# Patient Record
Sex: FEMALE | Race: WHITE | NOT HISPANIC OR LATINO | Employment: UNEMPLOYED | URBAN - METROPOLITAN AREA
[De-identification: names, ages, dates, MRNs, and addresses within clinical notes are randomized per-mention and may not be internally consistent; named-entity substitution may affect disease eponyms.]

---

## 2017-01-04 ENCOUNTER — COMMUNICATION - HEALTHEAST (OUTPATIENT)
Dept: FAMILY MEDICINE | Facility: CLINIC | Age: 47
End: 2017-01-04

## 2017-01-10 ENCOUNTER — COMMUNICATION - HEALTHEAST (OUTPATIENT)
Dept: FAMILY MEDICINE | Facility: CLINIC | Age: 47
End: 2017-01-10

## 2017-01-10 DIAGNOSIS — Z91.09 ENVIRONMENTAL ALLERGIES: ICD-10-CM

## 2017-01-11 ENCOUNTER — COMMUNICATION - HEALTHEAST (OUTPATIENT)
Dept: FAMILY MEDICINE | Facility: CLINIC | Age: 47
End: 2017-01-11

## 2017-01-13 ENCOUNTER — OFFICE VISIT - HEALTHEAST (OUTPATIENT)
Dept: FAMILY MEDICINE | Facility: CLINIC | Age: 47
End: 2017-01-13

## 2017-01-13 DIAGNOSIS — G89.29 NECK PAIN, CHRONIC: ICD-10-CM

## 2017-01-13 DIAGNOSIS — L08.9 BACTERIAL SKIN INFECTION OF UPPER EXTREMITY: ICD-10-CM

## 2017-01-13 DIAGNOSIS — M54.2 NECK PAIN, CHRONIC: ICD-10-CM

## 2017-01-19 ENCOUNTER — COMMUNICATION - HEALTHEAST (OUTPATIENT)
Dept: FAMILY MEDICINE | Facility: CLINIC | Age: 47
End: 2017-01-19

## 2017-02-09 ENCOUNTER — COMMUNICATION - HEALTHEAST (OUTPATIENT)
Dept: FAMILY MEDICINE | Facility: CLINIC | Age: 47
End: 2017-02-09

## 2017-02-09 DIAGNOSIS — J45.909 ASTHMA: ICD-10-CM

## 2017-02-19 ENCOUNTER — COMMUNICATION - HEALTHEAST (OUTPATIENT)
Dept: FAMILY MEDICINE | Facility: CLINIC | Age: 47
End: 2017-02-19

## 2017-02-21 ENCOUNTER — COMMUNICATION - HEALTHEAST (OUTPATIENT)
Dept: FAMILY MEDICINE | Facility: CLINIC | Age: 47
End: 2017-02-21

## 2017-02-21 DIAGNOSIS — G89.29 NECK PAIN, CHRONIC: ICD-10-CM

## 2017-02-21 DIAGNOSIS — M54.2 NECK PAIN, CHRONIC: ICD-10-CM

## 2017-03-03 ENCOUNTER — COMMUNICATION - HEALTHEAST (OUTPATIENT)
Dept: FAMILY MEDICINE | Facility: CLINIC | Age: 47
End: 2017-03-03

## 2017-03-03 DIAGNOSIS — J45.909 ASTHMA: ICD-10-CM

## 2017-03-20 ENCOUNTER — COMMUNICATION - HEALTHEAST (OUTPATIENT)
Dept: FAMILY MEDICINE | Facility: CLINIC | Age: 47
End: 2017-03-20

## 2017-03-20 DIAGNOSIS — J45.909 ASTHMA: ICD-10-CM

## 2017-03-23 ENCOUNTER — COMMUNICATION - HEALTHEAST (OUTPATIENT)
Dept: FAMILY MEDICINE | Facility: CLINIC | Age: 47
End: 2017-03-23

## 2017-03-23 DIAGNOSIS — K21.9 ESOPHAGEAL REFLUX: ICD-10-CM

## 2017-03-30 ENCOUNTER — COMMUNICATION - HEALTHEAST (OUTPATIENT)
Dept: FAMILY MEDICINE | Facility: CLINIC | Age: 47
End: 2017-03-30

## 2017-03-30 DIAGNOSIS — M54.2 NECK PAIN, CHRONIC: ICD-10-CM

## 2017-03-30 DIAGNOSIS — G89.29 NECK PAIN, CHRONIC: ICD-10-CM

## 2017-04-05 ENCOUNTER — COMMUNICATION - HEALTHEAST (OUTPATIENT)
Dept: PHYSICAL MEDICINE AND REHAB | Facility: CLINIC | Age: 47
End: 2017-04-05

## 2017-04-11 ENCOUNTER — COMMUNICATION - HEALTHEAST (OUTPATIENT)
Dept: PHYSICAL MEDICINE AND REHAB | Facility: CLINIC | Age: 47
End: 2017-04-11

## 2017-04-26 ENCOUNTER — COMMUNICATION - HEALTHEAST (OUTPATIENT)
Dept: FAMILY MEDICINE | Facility: CLINIC | Age: 47
End: 2017-04-26

## 2017-04-26 DIAGNOSIS — M54.2 NECK PAIN, CHRONIC: ICD-10-CM

## 2017-04-26 DIAGNOSIS — G89.29 NECK PAIN, CHRONIC: ICD-10-CM

## 2017-05-01 ENCOUNTER — HOSPITAL ENCOUNTER (OUTPATIENT)
Dept: MRI IMAGING | Facility: HOSPITAL | Age: 47
Discharge: HOME | End: 2017-05-01
Attending: ORTHOPAEDIC SURGERY

## 2017-05-01 DIAGNOSIS — S14.2XXA: ICD-10-CM

## 2017-05-01 DIAGNOSIS — M50.30 DEGENERATIVE DISC DISEASE, CERVICAL: ICD-10-CM

## 2017-05-01 DIAGNOSIS — R51.9 HEADACHE: ICD-10-CM

## 2017-05-12 ENCOUNTER — HOSPITAL ENCOUNTER (OUTPATIENT)
Dept: PHYSICAL MEDICINE AND REHAB | Facility: CLINIC | Age: 47
Discharge: HOME | End: 2017-05-12
Attending: ORTHOPAEDIC SURGERY

## 2017-05-12 DIAGNOSIS — M54.12 CERVICAL RADICULOPATHY: ICD-10-CM

## 2017-05-12 ASSESSMENT — MIFFLIN-ST. JEOR: SCORE: 1155.2

## 2017-05-15 ENCOUNTER — COMMUNICATION - HEALTHEAST (OUTPATIENT)
Dept: SCHEDULING | Facility: CLINIC | Age: 47
End: 2017-05-15

## 2017-05-15 ENCOUNTER — OFFICE VISIT - HEALTHEAST (OUTPATIENT)
Dept: FAMILY MEDICINE | Facility: CLINIC | Age: 47
End: 2017-05-15

## 2017-05-15 DIAGNOSIS — I10 HYPERTENSION: ICD-10-CM

## 2017-05-15 DIAGNOSIS — M50.20 PROTRUSION OF CERVICAL INTERVERTEBRAL DISC: ICD-10-CM

## 2017-05-15 DIAGNOSIS — R93.0 ABNORMAL MRI OF HEAD: ICD-10-CM

## 2017-05-15 DIAGNOSIS — F30.9 BIPOLAR I DISORDER, SINGLE MANIC EPISODE (H): ICD-10-CM

## 2017-05-22 ENCOUNTER — COMMUNICATION - HEALTHEAST (OUTPATIENT)
Dept: FAMILY MEDICINE | Facility: CLINIC | Age: 47
End: 2017-05-22

## 2017-05-22 DIAGNOSIS — G89.29 NECK PAIN, CHRONIC: ICD-10-CM

## 2017-05-22 DIAGNOSIS — M54.2 NECK PAIN, CHRONIC: ICD-10-CM

## 2017-05-24 ENCOUNTER — HOSPITAL ENCOUNTER (OUTPATIENT)
Dept: MRI IMAGING | Facility: HOSPITAL | Age: 47
Discharge: HOME | End: 2017-05-24
Attending: FAMILY MEDICINE

## 2017-05-24 DIAGNOSIS — R93.0 ABNORMAL MRI OF HEAD: ICD-10-CM

## 2017-05-26 ENCOUNTER — COMMUNICATION - HEALTHEAST (OUTPATIENT)
Dept: FAMILY MEDICINE | Facility: CLINIC | Age: 47
End: 2017-05-26

## 2017-05-26 ENCOUNTER — AMBULATORY - HEALTHEAST (OUTPATIENT)
Dept: NEUROSURGERY | Facility: CLINIC | Age: 47
End: 2017-05-26

## 2017-05-26 DIAGNOSIS — R93.0 ABNORMAL FINDINGS ON DIAGNOSTIC IMAGING OF SKULL AND HEAD, NOT ELSEWHERE CLASSIFIED: ICD-10-CM

## 2017-05-26 DIAGNOSIS — D32.9 MENINGIOMA (H): ICD-10-CM

## 2017-05-30 ENCOUNTER — COMMUNICATION - HEALTHEAST (OUTPATIENT)
Dept: FAMILY MEDICINE | Facility: CLINIC | Age: 47
End: 2017-05-30

## 2017-06-06 ENCOUNTER — HOSPITAL ENCOUNTER (OUTPATIENT)
Dept: MRI IMAGING | Facility: HOSPITAL | Age: 47
Discharge: HOME | End: 2017-06-06
Attending: FAMILY MEDICINE

## 2017-06-06 DIAGNOSIS — D32.9 MENINGIOMA (H): ICD-10-CM

## 2017-06-06 DIAGNOSIS — R93.0 ABNORMAL FINDINGS ON DIAGNOSTIC IMAGING OF SKULL AND HEAD, NOT ELSEWHERE CLASSIFIED: ICD-10-CM

## 2017-06-09 ENCOUNTER — OFFICE VISIT - HEALTHEAST (OUTPATIENT)
Dept: NEUROSURGERY | Facility: CLINIC | Age: 47
End: 2017-06-09

## 2017-06-09 ENCOUNTER — COMMUNICATION - HEALTHEAST (OUTPATIENT)
Dept: FAMILY MEDICINE | Facility: CLINIC | Age: 47
End: 2017-06-09

## 2017-06-09 ENCOUNTER — AMBULATORY - HEALTHEAST (OUTPATIENT)
Dept: NEUROSURGERY | Facility: CLINIC | Age: 47
End: 2017-06-09

## 2017-06-09 DIAGNOSIS — F41.9 ANXIETY: ICD-10-CM

## 2017-06-09 DIAGNOSIS — D32.0 MENINGIOMA OF CEREBELLUM (H): ICD-10-CM

## 2017-06-09 DIAGNOSIS — G47.00 INSOMNIA: ICD-10-CM

## 2017-06-09 ASSESSMENT — MIFFLIN-ST. JEOR: SCORE: 1159.74

## 2017-06-12 ENCOUNTER — COMMUNICATION - HEALTHEAST (OUTPATIENT)
Dept: FAMILY MEDICINE | Facility: CLINIC | Age: 47
End: 2017-06-12

## 2017-06-12 ENCOUNTER — COMMUNICATION - HEALTHEAST (OUTPATIENT)
Dept: NEUROSURGERY | Facility: CLINIC | Age: 47
End: 2017-06-12

## 2017-06-13 ENCOUNTER — OFFICE VISIT - HEALTHEAST (OUTPATIENT)
Dept: NEUROSURGERY | Facility: CLINIC | Age: 47
End: 2017-06-13

## 2017-06-13 ENCOUNTER — AMBULATORY - HEALTHEAST (OUTPATIENT)
Dept: NEUROSURGERY | Facility: CLINIC | Age: 47
End: 2017-06-13

## 2017-06-13 ENCOUNTER — COMMUNICATION - HEALTHEAST (OUTPATIENT)
Dept: FAMILY MEDICINE | Facility: CLINIC | Age: 47
End: 2017-06-13

## 2017-06-13 DIAGNOSIS — R59.9 SWOLLEN LYMPH NODES: ICD-10-CM

## 2017-06-13 DIAGNOSIS — D32.0 CEREBELLAR MENINGIOMA (H): ICD-10-CM

## 2017-06-13 ASSESSMENT — MIFFLIN-ST. JEOR: SCORE: 1159.74

## 2017-06-14 ENCOUNTER — AMBULATORY - HEALTHEAST (OUTPATIENT)
Dept: NEUROSURGERY | Facility: CLINIC | Age: 47
End: 2017-06-14

## 2017-06-14 ENCOUNTER — COMMUNICATION - HEALTHEAST (OUTPATIENT)
Dept: NEUROSURGERY | Facility: CLINIC | Age: 47
End: 2017-06-14

## 2017-06-14 DIAGNOSIS — D32.9 MENINGIOMA (H): ICD-10-CM

## 2017-06-14 DIAGNOSIS — Z01.818 PRE-OP EVALUATION: ICD-10-CM

## 2017-06-15 ENCOUNTER — COMMUNICATION - HEALTHEAST (OUTPATIENT)
Dept: NEUROSURGERY | Facility: CLINIC | Age: 47
End: 2017-06-15

## 2017-06-19 ENCOUNTER — OFFICE VISIT - HEALTHEAST (OUTPATIENT)
Dept: NEUROSURGERY | Facility: CLINIC | Age: 47
End: 2017-06-19

## 2017-06-19 ENCOUNTER — AMBULATORY - HEALTHEAST (OUTPATIENT)
Dept: NEUROSURGERY | Facility: CLINIC | Age: 47
End: 2017-06-19

## 2017-06-19 DIAGNOSIS — D32.9 MENINGIOMA (H): ICD-10-CM

## 2017-06-19 ASSESSMENT — MIFFLIN-ST. JEOR: SCORE: 1159.74

## 2017-06-20 ENCOUNTER — COMMUNICATION - HEALTHEAST (OUTPATIENT)
Dept: FAMILY MEDICINE | Facility: CLINIC | Age: 47
End: 2017-06-20

## 2017-06-20 DIAGNOSIS — M54.2 NECK PAIN, CHRONIC: ICD-10-CM

## 2017-06-20 DIAGNOSIS — Z91.09 OTHER ALLERGY, OTHER THAN TO MEDICINAL AGENTS: ICD-10-CM

## 2017-06-20 DIAGNOSIS — G89.29 NECK PAIN, CHRONIC: ICD-10-CM

## 2017-06-21 ENCOUNTER — COMMUNICATION - HEALTHEAST (OUTPATIENT)
Dept: FAMILY MEDICINE | Facility: CLINIC | Age: 47
End: 2017-06-21

## 2017-06-21 DIAGNOSIS — G89.29 NECK PAIN, CHRONIC: ICD-10-CM

## 2017-06-21 DIAGNOSIS — M54.2 NECK PAIN, CHRONIC: ICD-10-CM

## 2017-06-22 ENCOUNTER — HOSPITAL ENCOUNTER (OUTPATIENT)
Dept: ULTRASOUND IMAGING | Facility: HOSPITAL | Age: 47
Discharge: HOME | End: 2017-06-22
Attending: NURSE PRACTITIONER

## 2017-06-22 ENCOUNTER — HOSPITAL ENCOUNTER (OUTPATIENT)
Dept: RADIOLOGY | Facility: HOSPITAL | Age: 47
Discharge: HOME | End: 2017-06-22
Attending: NEUROLOGICAL SURGERY

## 2017-06-22 DIAGNOSIS — D32.0 CEREBELLAR MENINGIOMA (H): ICD-10-CM

## 2017-06-22 DIAGNOSIS — R59.9 SWOLLEN LYMPH NODES: ICD-10-CM

## 2017-06-26 ENCOUNTER — COMMUNICATION - HEALTHEAST (OUTPATIENT)
Dept: FAMILY MEDICINE | Facility: CLINIC | Age: 47
End: 2017-06-26

## 2017-06-27 ENCOUNTER — OFFICE VISIT - HEALTHEAST (OUTPATIENT)
Dept: FAMILY MEDICINE | Facility: CLINIC | Age: 47
End: 2017-06-27

## 2017-06-27 DIAGNOSIS — K21.9 GASTROESOPHAGEAL REFLUX DISEASE WITHOUT ESOPHAGITIS: ICD-10-CM

## 2017-06-27 DIAGNOSIS — D32.9 MENINGIOMA (H): ICD-10-CM

## 2017-06-27 DIAGNOSIS — Z01.818 PRE-OP EXAM: ICD-10-CM

## 2017-06-27 DIAGNOSIS — Z01.818 PRE-OP EVALUATION: ICD-10-CM

## 2017-06-27 LAB
ATRIAL RATE - MUSE: 64 BPM
DIASTOLIC BLOOD PRESSURE - MUSE: NORMAL MMHG
INTERPRETATION ECG - MUSE: NORMAL
P AXIS - MUSE: 37 DEGREES
PR INTERVAL - MUSE: 148 MS
QRS DURATION - MUSE: 90 MS
QT - MUSE: 394 MS
QTC - MUSE: 406 MS
R AXIS - MUSE: 16 DEGREES
SYSTOLIC BLOOD PRESSURE - MUSE: NORMAL MMHG
T AXIS - MUSE: 26 DEGREES
VENTRICULAR RATE- MUSE: 64 BPM

## 2017-06-27 ASSESSMENT — MIFFLIN-ST. JEOR: SCORE: 1147.71

## 2017-06-30 ENCOUNTER — AMBULATORY - HEALTHEAST (OUTPATIENT)
Dept: LAB | Facility: CLINIC | Age: 47
End: 2017-06-30

## 2017-06-30 ENCOUNTER — OFFICE VISIT - HEALTHEAST (OUTPATIENT)
Dept: NEUROSURGERY | Facility: CLINIC | Age: 47
End: 2017-06-30

## 2017-06-30 ENCOUNTER — COMMUNICATION - HEALTHEAST (OUTPATIENT)
Dept: ONCOLOGY | Facility: CLINIC | Age: 47
End: 2017-06-30

## 2017-06-30 ENCOUNTER — COMMUNICATION - HEALTHEAST (OUTPATIENT)
Dept: NEUROSURGERY | Facility: CLINIC | Age: 47
End: 2017-06-30

## 2017-06-30 ENCOUNTER — COMMUNICATION - HEALTHEAST (OUTPATIENT)
Dept: FAMILY MEDICINE | Facility: CLINIC | Age: 47
End: 2017-06-30

## 2017-06-30 DIAGNOSIS — Z01.818 PRE-OP EVALUATION: ICD-10-CM

## 2017-06-30 DIAGNOSIS — E87.1 HYPONATREMIA: ICD-10-CM

## 2017-06-30 DIAGNOSIS — R79.1 ELEVATED PARTIAL THROMBOPLASTIN TIME (PTT): ICD-10-CM

## 2017-07-03 ENCOUNTER — COMMUNICATION - HEALTHEAST (OUTPATIENT)
Dept: FAMILY MEDICINE | Facility: CLINIC | Age: 47
End: 2017-07-03

## 2017-07-04 ENCOUNTER — COMMUNICATION - HEALTHEAST (OUTPATIENT)
Dept: FAMILY MEDICINE | Facility: CLINIC | Age: 47
End: 2017-07-04

## 2017-07-05 ENCOUNTER — COMMUNICATION - HEALTHEAST (OUTPATIENT)
Dept: NEUROSURGERY | Facility: CLINIC | Age: 47
End: 2017-07-05

## 2017-07-06 ENCOUNTER — OFFICE VISIT - HEALTHEAST (OUTPATIENT)
Dept: ONCOLOGY | Facility: CLINIC | Age: 47
End: 2017-07-06

## 2017-07-06 DIAGNOSIS — R79.1 PROLONGED PTT: ICD-10-CM

## 2017-07-06 ASSESSMENT — MIFFLIN-ST. JEOR: SCORE: 1160.19

## 2017-07-07 ENCOUNTER — AMBULATORY - HEALTHEAST (OUTPATIENT)
Dept: ONCOLOGY | Facility: CLINIC | Age: 47
End: 2017-07-07

## 2017-07-07 DIAGNOSIS — R79.1 PROLONGED PTT: ICD-10-CM

## 2017-07-10 ENCOUNTER — COMMUNICATION - HEALTHEAST (OUTPATIENT)
Dept: FAMILY MEDICINE | Facility: CLINIC | Age: 47
End: 2017-07-10

## 2017-07-11 LAB
SPECIMEN STATUS: NORMAL
SPECIMEN STATUS: NORMAL

## 2017-07-12 ENCOUNTER — COMMUNICATION - HEALTHEAST (OUTPATIENT)
Dept: NEUROSURGERY | Facility: CLINIC | Age: 47
End: 2017-07-12

## 2017-07-12 ENCOUNTER — COMMUNICATION - HEALTHEAST (OUTPATIENT)
Dept: ONCOLOGY | Facility: CLINIC | Age: 47
End: 2017-07-12

## 2017-07-12 ENCOUNTER — COMMUNICATION - HEALTHEAST (OUTPATIENT)
Dept: FAMILY MEDICINE | Facility: CLINIC | Age: 47
End: 2017-07-12

## 2017-07-12 LAB — DRVVT, LUPUS ANTICOAGULANT - HISTORICAL: 47 SEC

## 2017-07-18 ENCOUNTER — OFFICE VISIT - HEALTHEAST (OUTPATIENT)
Dept: FAMILY MEDICINE | Facility: CLINIC | Age: 47
End: 2017-07-18

## 2017-07-18 ENCOUNTER — COMMUNICATION - HEALTHEAST (OUTPATIENT)
Dept: FAMILY MEDICINE | Facility: CLINIC | Age: 47
End: 2017-07-18

## 2017-07-18 DIAGNOSIS — R79.1 PROLONGED PTT: ICD-10-CM

## 2017-07-18 DIAGNOSIS — E87.1 HYPONATREMIA: ICD-10-CM

## 2017-07-20 ENCOUNTER — COMMUNICATION - HEALTHEAST (OUTPATIENT)
Dept: FAMILY MEDICINE | Facility: CLINIC | Age: 47
End: 2017-07-20

## 2017-07-23 ENCOUNTER — COMMUNICATION - HEALTHEAST (OUTPATIENT)
Dept: FAMILY MEDICINE | Facility: CLINIC | Age: 47
End: 2017-07-23

## 2017-07-23 DIAGNOSIS — Z91.09 OTHER ALLERGY, OTHER THAN TO MEDICINAL AGENTS: ICD-10-CM

## 2017-07-26 ENCOUNTER — COMMUNICATION - HEALTHEAST (OUTPATIENT)
Dept: FAMILY MEDICINE | Facility: CLINIC | Age: 47
End: 2017-07-26

## 2017-07-27 ENCOUNTER — COMMUNICATION - HEALTHEAST (OUTPATIENT)
Dept: FAMILY MEDICINE | Facility: CLINIC | Age: 47
End: 2017-07-27

## 2017-07-27 DIAGNOSIS — M54.2 NECK PAIN, CHRONIC: ICD-10-CM

## 2017-07-27 DIAGNOSIS — G89.29 NECK PAIN, CHRONIC: ICD-10-CM

## 2017-08-10 ENCOUNTER — COMMUNICATION - HEALTHEAST (OUTPATIENT)
Dept: FAMILY MEDICINE | Facility: CLINIC | Age: 47
End: 2017-08-10

## 2017-08-16 ENCOUNTER — RECORDS - HEALTHEAST (OUTPATIENT)
Dept: ADMINISTRATIVE | Facility: OTHER | Age: 47
End: 2017-08-16

## 2017-08-17 ENCOUNTER — AMBULATORY - HEALTHEAST (OUTPATIENT)
Dept: LAB | Facility: CLINIC | Age: 47
End: 2017-08-17

## 2017-08-17 DIAGNOSIS — E87.0 HYPEROSMOLALITY AND/OR HYPERNATREMIA: ICD-10-CM

## 2017-08-23 ENCOUNTER — COMMUNICATION - HEALTHEAST (OUTPATIENT)
Dept: FAMILY MEDICINE | Facility: CLINIC | Age: 47
End: 2017-08-23

## 2017-08-24 ENCOUNTER — AMBULATORY - HEALTHEAST (OUTPATIENT)
Dept: LAB | Facility: CLINIC | Age: 47
End: 2017-08-24

## 2017-08-24 ENCOUNTER — HOSPITAL ENCOUNTER (OUTPATIENT)
Dept: LAB | Age: 47
Setting detail: SPECIMEN
Discharge: HOME | End: 2017-08-24

## 2017-08-24 DIAGNOSIS — E87.0 HYPEROSMOLALITY AND/OR HYPERNATREMIA: ICD-10-CM

## 2017-08-25 ENCOUNTER — COMMUNICATION - HEALTHEAST (OUTPATIENT)
Dept: FAMILY MEDICINE | Facility: CLINIC | Age: 47
End: 2017-08-25

## 2017-08-25 DIAGNOSIS — M54.2 NECK PAIN, CHRONIC: ICD-10-CM

## 2017-08-25 DIAGNOSIS — G89.29 NECK PAIN, CHRONIC: ICD-10-CM

## 2017-08-28 ENCOUNTER — COMMUNICATION - HEALTHEAST (OUTPATIENT)
Dept: FAMILY MEDICINE | Facility: CLINIC | Age: 47
End: 2017-08-28

## 2017-08-28 DIAGNOSIS — M62.838 MUSCLE SPASMS OF NECK: ICD-10-CM

## 2017-08-29 ENCOUNTER — AMBULATORY - HEALTHEAST (OUTPATIENT)
Dept: LAB | Facility: CLINIC | Age: 47
End: 2017-08-29

## 2017-08-29 DIAGNOSIS — E87.1 HYPONATREMIA: ICD-10-CM

## 2017-09-01 ENCOUNTER — COMMUNICATION - HEALTHEAST (OUTPATIENT)
Dept: FAMILY MEDICINE | Facility: CLINIC | Age: 47
End: 2017-09-01

## 2017-09-06 ENCOUNTER — COMMUNICATION - HEALTHEAST (OUTPATIENT)
Dept: FAMILY MEDICINE | Facility: CLINIC | Age: 47
End: 2017-09-06

## 2017-09-07 ENCOUNTER — COMMUNICATION - HEALTHEAST (OUTPATIENT)
Dept: FAMILY MEDICINE | Facility: CLINIC | Age: 47
End: 2017-09-07

## 2017-09-12 ENCOUNTER — COMMUNICATION - HEALTHEAST (OUTPATIENT)
Dept: FAMILY MEDICINE | Facility: CLINIC | Age: 47
End: 2017-09-12

## 2017-09-19 ENCOUNTER — OFFICE VISIT - HEALTHEAST (OUTPATIENT)
Dept: FAMILY MEDICINE | Facility: CLINIC | Age: 47
End: 2017-09-19

## 2017-09-19 DIAGNOSIS — Z12.31 VISIT FOR SCREENING MAMMOGRAM: ICD-10-CM

## 2017-09-19 DIAGNOSIS — D64.9 ANEMIA: ICD-10-CM

## 2017-09-19 DIAGNOSIS — E87.1 HYPONATREMIA: ICD-10-CM

## 2017-09-19 DIAGNOSIS — M62.838 MUSCLE SPASMS OF NECK: ICD-10-CM

## 2017-09-25 ENCOUNTER — AMBULATORY - HEALTHEAST (OUTPATIENT)
Dept: NEUROSURGERY | Facility: CLINIC | Age: 47
End: 2017-09-25

## 2017-09-25 ENCOUNTER — COMMUNICATION - HEALTHEAST (OUTPATIENT)
Dept: NEUROSURGERY | Facility: CLINIC | Age: 47
End: 2017-09-25

## 2017-09-25 DIAGNOSIS — Z01.818 PRE-OP EVALUATION: ICD-10-CM

## 2017-09-27 ENCOUNTER — COMMUNICATION - HEALTHEAST (OUTPATIENT)
Dept: NEUROSURGERY | Facility: CLINIC | Age: 47
End: 2017-09-27

## 2017-09-29 ENCOUNTER — COMMUNICATION - HEALTHEAST (OUTPATIENT)
Dept: NEUROSURGERY | Facility: CLINIC | Age: 47
End: 2017-09-29

## 2017-10-12 ENCOUNTER — COMMUNICATION - HEALTHEAST (OUTPATIENT)
Dept: FAMILY MEDICINE | Facility: CLINIC | Age: 47
End: 2017-10-12

## 2017-10-17 ENCOUNTER — OFFICE VISIT - HEALTHEAST (OUTPATIENT)
Dept: FAMILY MEDICINE | Facility: CLINIC | Age: 47
End: 2017-10-17

## 2017-10-17 DIAGNOSIS — Z01.810 PREOPERATIVE CARDIOVASCULAR EXAMINATION: ICD-10-CM

## 2017-10-18 ENCOUNTER — COMMUNICATION - HEALTHEAST (OUTPATIENT)
Dept: FAMILY MEDICINE | Facility: CLINIC | Age: 47
End: 2017-10-18

## 2017-10-18 LAB
ATRIAL RATE - MUSE: 65 BPM
DIASTOLIC BLOOD PRESSURE - MUSE: NORMAL MMHG
INTERPRETATION ECG - MUSE: NORMAL
P AXIS - MUSE: 30 DEGREES
PR INTERVAL - MUSE: 140 MS
QRS DURATION - MUSE: 84 MS
QT - MUSE: 406 MS
QTC - MUSE: 422 MS
R AXIS - MUSE: 19 DEGREES
SYSTOLIC BLOOD PRESSURE - MUSE: NORMAL MMHG
T AXIS - MUSE: 22 DEGREES
VENTRICULAR RATE- MUSE: 65 BPM

## 2017-10-19 ENCOUNTER — COMMUNICATION - HEALTHEAST (OUTPATIENT)
Dept: FAMILY MEDICINE | Facility: CLINIC | Age: 47
End: 2017-10-19

## 2017-10-23 ENCOUNTER — AMBULATORY - HEALTHEAST (OUTPATIENT)
Dept: LAB | Facility: HOSPITAL | Age: 47
End: 2017-10-23

## 2017-10-23 DIAGNOSIS — Z01.818 PRE-OP EVALUATION: ICD-10-CM

## 2017-10-26 ENCOUNTER — OFFICE VISIT - HEALTHEAST (OUTPATIENT)
Dept: NEUROSURGERY | Facility: CLINIC | Age: 47
End: 2017-10-26

## 2017-10-26 DIAGNOSIS — Z01.818 PRE-OP EVALUATION: ICD-10-CM

## 2017-10-30 ENCOUNTER — HOSPITAL ENCOUNTER (OUTPATIENT)
Dept: INTERVENTIONAL RADIOLOGY/VASCULAR | Facility: CLINIC | Age: 47
Discharge: HOME | End: 2017-10-30
Attending: SURGERY

## 2017-10-30 ENCOUNTER — HOSPITAL ENCOUNTER (OUTPATIENT)
Dept: MRI IMAGING | Facility: CLINIC | Age: 47
Discharge: HOME | End: 2017-10-30
Attending: NEUROLOGICAL SURGERY

## 2017-10-30 ENCOUNTER — ANESTHESIA - HEALTHEAST (OUTPATIENT)
Dept: SURGERY | Facility: CLINIC | Age: 47
End: 2017-10-30

## 2017-10-30 DIAGNOSIS — D32.9 MENINGIOMA (H): ICD-10-CM

## 2017-10-30 DIAGNOSIS — Z01.818 PRE-OP EVALUATION: ICD-10-CM

## 2017-10-30 ASSESSMENT — MIFFLIN-ST. JEOR: SCORE: 1156.33

## 2017-10-31 ENCOUNTER — SURGERY - HEALTHEAST (OUTPATIENT)
Dept: SURGERY | Facility: CLINIC | Age: 47
End: 2017-10-31

## 2017-10-31 ASSESSMENT — MIFFLIN-ST. JEOR: SCORE: 1148.4

## 2017-11-02 ENCOUNTER — COMMUNICATION - HEALTHEAST (OUTPATIENT)
Dept: FAMILY MEDICINE | Facility: CLINIC | Age: 47
End: 2017-11-02

## 2017-11-02 DIAGNOSIS — M62.838 MUSCLE SPASMS OF NECK: ICD-10-CM

## 2017-11-03 ENCOUNTER — COMMUNICATION - HEALTHEAST (OUTPATIENT)
Dept: FAMILY MEDICINE | Facility: CLINIC | Age: 47
End: 2017-11-03

## 2017-11-03 DIAGNOSIS — J45.909 ASTHMA: ICD-10-CM

## 2017-11-06 ENCOUNTER — COMMUNICATION - HEALTHEAST (OUTPATIENT)
Dept: NEUROSURGERY | Facility: CLINIC | Age: 47
End: 2017-11-06

## 2017-11-09 ENCOUNTER — OFFICE VISIT - HEALTHEAST (OUTPATIENT)
Dept: FAMILY MEDICINE | Facility: CLINIC | Age: 47
End: 2017-11-09

## 2017-11-09 DIAGNOSIS — D32.9 MENINGIOMA (H): ICD-10-CM

## 2017-11-09 DIAGNOSIS — G93.5 BRAIN COMPRESSION (H): ICD-10-CM

## 2017-11-10 ENCOUNTER — AMBULATORY - HEALTHEAST (OUTPATIENT)
Dept: NEUROSURGERY | Facility: CLINIC | Age: 47
End: 2017-11-10

## 2017-11-10 ENCOUNTER — COMMUNICATION - HEALTHEAST (OUTPATIENT)
Dept: FAMILY MEDICINE | Facility: CLINIC | Age: 47
End: 2017-11-10

## 2017-11-10 DIAGNOSIS — Z51.89 VISIT FOR WOUND CHECK: ICD-10-CM

## 2017-11-13 ENCOUNTER — COMMUNICATION - HEALTHEAST (OUTPATIENT)
Dept: NEUROSURGERY | Facility: CLINIC | Age: 47
End: 2017-11-13

## 2017-11-13 ENCOUNTER — RECORDS - HEALTHEAST (OUTPATIENT)
Dept: ADMINISTRATIVE | Facility: OTHER | Age: 47
End: 2017-11-13

## 2017-11-13 DIAGNOSIS — M79.89 LEG SWELLING: ICD-10-CM

## 2017-11-13 DIAGNOSIS — D32.9 MENINGIOMA (H): ICD-10-CM

## 2017-11-15 ENCOUNTER — AMBULATORY - HEALTHEAST (OUTPATIENT)
Dept: LAB | Facility: CLINIC | Age: 47
End: 2017-11-15

## 2017-11-15 ENCOUNTER — COMMUNICATION - HEALTHEAST (OUTPATIENT)
Dept: FAMILY MEDICINE | Facility: CLINIC | Age: 47
End: 2017-11-15

## 2017-11-15 DIAGNOSIS — D32.9 MENINGIOMA (H): ICD-10-CM

## 2017-11-15 DIAGNOSIS — G93.5 BRAIN COMPRESSION (H): ICD-10-CM

## 2017-11-15 DIAGNOSIS — E87.1 HYPONATREMIA: ICD-10-CM

## 2017-11-16 ENCOUNTER — HOSPITAL ENCOUNTER (OUTPATIENT)
Dept: ULTRASOUND IMAGING | Facility: HOSPITAL | Age: 47
Discharge: HOME | End: 2017-11-16
Attending: NEUROLOGICAL SURGERY

## 2017-11-16 DIAGNOSIS — M79.89 LEG SWELLING: ICD-10-CM

## 2017-11-20 ENCOUNTER — COMMUNICATION - HEALTHEAST (OUTPATIENT)
Dept: FAMILY MEDICINE | Facility: CLINIC | Age: 47
End: 2017-11-20

## 2017-11-20 ENCOUNTER — AMBULATORY - HEALTHEAST (OUTPATIENT)
Dept: NEUROSURGERY | Facility: CLINIC | Age: 47
End: 2017-11-20

## 2017-11-20 DIAGNOSIS — G93.5 BRAIN COMPRESSION (H): ICD-10-CM

## 2017-11-20 DIAGNOSIS — Z48.02 VISIT FOR SUTURE REMOVAL: ICD-10-CM

## 2017-11-20 DIAGNOSIS — D32.9 MENINGIOMA (H): ICD-10-CM

## 2017-11-24 ENCOUNTER — HOSPITAL ENCOUNTER (OUTPATIENT)
Dept: MRI IMAGING | Facility: CLINIC | Age: 47
Discharge: HOME | End: 2017-11-24
Attending: SURGERY

## 2017-11-24 DIAGNOSIS — D32.9 MENINGIOMA (H): ICD-10-CM

## 2017-11-27 ENCOUNTER — COMMUNICATION - HEALTHEAST (OUTPATIENT)
Dept: FAMILY MEDICINE | Facility: CLINIC | Age: 47
End: 2017-11-27

## 2017-11-29 ENCOUNTER — OFFICE VISIT - HEALTHEAST (OUTPATIENT)
Dept: FAMILY MEDICINE | Facility: CLINIC | Age: 47
End: 2017-11-29

## 2017-11-29 DIAGNOSIS — R09.81 NASAL CONGESTION: ICD-10-CM

## 2017-11-29 DIAGNOSIS — E87.1 HYPONATREMIA: ICD-10-CM

## 2017-11-30 ENCOUNTER — COMMUNICATION - HEALTHEAST (OUTPATIENT)
Dept: FAMILY MEDICINE | Facility: CLINIC | Age: 47
End: 2017-11-30

## 2017-12-07 ENCOUNTER — COMMUNICATION - HEALTHEAST (OUTPATIENT)
Dept: FAMILY MEDICINE | Facility: CLINIC | Age: 47
End: 2017-12-07

## 2017-12-07 DIAGNOSIS — D32.9 MENINGIOMA (H): ICD-10-CM

## 2017-12-07 DIAGNOSIS — G93.5 BRAIN COMPRESSION (H): ICD-10-CM

## 2017-12-08 ENCOUNTER — COMMUNICATION - HEALTHEAST (OUTPATIENT)
Dept: FAMILY MEDICINE | Facility: CLINIC | Age: 47
End: 2017-12-08

## 2017-12-11 ENCOUNTER — OFFICE VISIT - HEALTHEAST (OUTPATIENT)
Dept: NEUROSURGERY | Facility: CLINIC | Age: 47
End: 2017-12-11

## 2017-12-11 ENCOUNTER — COMMUNICATION - HEALTHEAST (OUTPATIENT)
Dept: FAMILY MEDICINE | Facility: CLINIC | Age: 47
End: 2017-12-11

## 2017-12-11 DIAGNOSIS — D32.9 MENINGIOMA (H): ICD-10-CM

## 2017-12-11 DIAGNOSIS — M62.838 MUSCLE SPASMS OF NECK: ICD-10-CM

## 2017-12-11 ASSESSMENT — MIFFLIN-ST. JEOR: SCORE: 1227.78

## 2017-12-14 ENCOUNTER — RECORDS - HEALTHEAST (OUTPATIENT)
Dept: ADMINISTRATIVE | Facility: OTHER | Age: 47
End: 2017-12-14

## 2017-12-14 ENCOUNTER — COMMUNICATION - HEALTHEAST (OUTPATIENT)
Dept: FAMILY MEDICINE | Facility: CLINIC | Age: 47
End: 2017-12-14

## 2017-12-15 ENCOUNTER — AMBULATORY - HEALTHEAST (OUTPATIENT)
Dept: LAB | Facility: CLINIC | Age: 47
End: 2017-12-15

## 2017-12-15 DIAGNOSIS — E87.1 HYPONATREMIA SYNDROME: ICD-10-CM

## 2018-01-03 ENCOUNTER — RECORDS - HEALTHEAST (OUTPATIENT)
Dept: ADMINISTRATIVE | Facility: OTHER | Age: 48
End: 2018-01-03

## 2018-01-16 ENCOUNTER — COMMUNICATION - HEALTHEAST (OUTPATIENT)
Dept: FAMILY MEDICINE | Facility: CLINIC | Age: 48
End: 2018-01-16

## 2018-01-16 DIAGNOSIS — M62.838 MUSCLE SPASMS OF NECK: ICD-10-CM

## 2018-01-30 ENCOUNTER — RECORDS - HEALTHEAST (OUTPATIENT)
Dept: ADMINISTRATIVE | Facility: OTHER | Age: 48
End: 2018-01-30

## 2018-02-07 ENCOUNTER — COMMUNICATION - HEALTHEAST (OUTPATIENT)
Dept: FAMILY MEDICINE | Facility: CLINIC | Age: 48
End: 2018-02-07

## 2018-02-07 DIAGNOSIS — D32.9 MENINGIOMA (H): ICD-10-CM

## 2018-02-07 DIAGNOSIS — G93.5 BRAIN COMPRESSION (H): ICD-10-CM

## 2018-02-27 ENCOUNTER — COMMUNICATION - HEALTHEAST (OUTPATIENT)
Dept: FAMILY MEDICINE | Facility: CLINIC | Age: 48
End: 2018-02-27

## 2018-02-27 DIAGNOSIS — M62.838 MUSCLE SPASMS OF NECK: ICD-10-CM

## 2018-03-02 ENCOUNTER — COMMUNICATION - HEALTHEAST (OUTPATIENT)
Dept: FAMILY MEDICINE | Facility: CLINIC | Age: 48
End: 2018-03-02

## 2018-03-02 DIAGNOSIS — J45.909 ASTHMA: ICD-10-CM

## 2018-03-08 ENCOUNTER — COMMUNICATION - HEALTHEAST (OUTPATIENT)
Dept: FAMILY MEDICINE | Facility: CLINIC | Age: 48
End: 2018-03-08

## 2018-03-08 DIAGNOSIS — J45.909 ASTHMA: ICD-10-CM

## 2018-04-04 ENCOUNTER — COMMUNICATION - HEALTHEAST (OUTPATIENT)
Dept: FAMILY MEDICINE | Facility: CLINIC | Age: 48
End: 2018-04-04

## 2018-04-04 DIAGNOSIS — D32.9 MENINGIOMA (H): ICD-10-CM

## 2018-04-04 DIAGNOSIS — G93.5 BRAIN COMPRESSION (H): ICD-10-CM

## 2018-04-05 ENCOUNTER — COMMUNICATION - HEALTHEAST (OUTPATIENT)
Dept: SCHEDULING | Facility: CLINIC | Age: 48
End: 2018-04-05

## 2018-04-11 ENCOUNTER — COMMUNICATION - HEALTHEAST (OUTPATIENT)
Dept: FAMILY MEDICINE | Facility: CLINIC | Age: 48
End: 2018-04-11

## 2018-04-13 ENCOUNTER — COMMUNICATION - HEALTHEAST (OUTPATIENT)
Dept: FAMILY MEDICINE | Facility: CLINIC | Age: 48
End: 2018-04-13

## 2018-04-13 DIAGNOSIS — M62.838 MUSCLE SPASMS OF NECK: ICD-10-CM

## 2018-04-29 ENCOUNTER — COMMUNICATION - HEALTHEAST (OUTPATIENT)
Dept: FAMILY MEDICINE | Facility: CLINIC | Age: 48
End: 2018-04-29

## 2018-04-29 DIAGNOSIS — G93.5 BRAIN COMPRESSION (H): ICD-10-CM

## 2018-04-29 DIAGNOSIS — D32.9 MENINGIOMA (H): ICD-10-CM

## 2018-04-30 ENCOUNTER — AMBULATORY - HEALTHEAST (OUTPATIENT)
Dept: FAMILY MEDICINE | Facility: CLINIC | Age: 48
End: 2018-04-30

## 2018-05-15 ENCOUNTER — COMMUNICATION - HEALTHEAST (OUTPATIENT)
Dept: FAMILY MEDICINE | Facility: CLINIC | Age: 48
End: 2018-05-15

## 2018-05-24 ENCOUNTER — COMMUNICATION - HEALTHEAST (OUTPATIENT)
Dept: FAMILY MEDICINE | Facility: CLINIC | Age: 48
End: 2018-05-24

## 2018-06-01 ENCOUNTER — COMMUNICATION - HEALTHEAST (OUTPATIENT)
Dept: FAMILY MEDICINE | Facility: CLINIC | Age: 48
End: 2018-06-01

## 2018-06-01 DIAGNOSIS — M62.838 MUSCLE SPASMS OF NECK: ICD-10-CM

## 2018-06-20 ENCOUNTER — COMMUNICATION - HEALTHEAST (OUTPATIENT)
Dept: FAMILY MEDICINE | Facility: CLINIC | Age: 48
End: 2018-06-20

## 2018-06-25 ENCOUNTER — COMMUNICATION - HEALTHEAST (OUTPATIENT)
Dept: FAMILY MEDICINE | Facility: CLINIC | Age: 48
End: 2018-06-25

## 2018-07-09 ENCOUNTER — COMMUNICATION - HEALTHEAST (OUTPATIENT)
Dept: FAMILY MEDICINE | Facility: CLINIC | Age: 48
End: 2018-07-09

## 2018-07-09 DIAGNOSIS — M62.838 MUSCLE SPASMS OF NECK: ICD-10-CM

## 2018-07-30 ENCOUNTER — COMMUNICATION - HEALTHEAST (OUTPATIENT)
Dept: FAMILY MEDICINE | Facility: CLINIC | Age: 48
End: 2018-07-30

## 2018-07-30 DIAGNOSIS — Z91.09 ENVIRONMENTAL ALLERGIES: ICD-10-CM

## 2018-08-02 ENCOUNTER — HOSPITAL ENCOUNTER (OUTPATIENT)
Dept: MRI IMAGING | Facility: CLINIC | Age: 48
Discharge: HOME | End: 2018-08-02

## 2018-08-02 DIAGNOSIS — D32.9 MENINGIOMA (H): ICD-10-CM

## 2018-08-06 ENCOUNTER — COMMUNICATION - HEALTHEAST (OUTPATIENT)
Dept: NEUROSURGERY | Facility: CLINIC | Age: 48
End: 2018-08-06

## 2018-08-06 DIAGNOSIS — D32.9 MENINGIOMA (H): ICD-10-CM

## 2018-08-14 ENCOUNTER — COMMUNICATION - HEALTHEAST (OUTPATIENT)
Dept: FAMILY MEDICINE | Facility: CLINIC | Age: 48
End: 2018-08-14

## 2018-08-14 DIAGNOSIS — M62.838 MUSCLE SPASMS OF NECK: ICD-10-CM

## 2018-08-20 ENCOUNTER — COMMUNICATION - HEALTHEAST (OUTPATIENT)
Dept: FAMILY MEDICINE | Facility: CLINIC | Age: 48
End: 2018-08-20

## 2018-08-20 DIAGNOSIS — J45.901 ASTHMA EXACERBATION: ICD-10-CM

## 2018-09-12 ENCOUNTER — COMMUNICATION - HEALTHEAST (OUTPATIENT)
Dept: FAMILY MEDICINE | Facility: CLINIC | Age: 48
End: 2018-09-12

## 2018-09-12 DIAGNOSIS — M50.20 PROTRUSION OF CERVICAL INTERVERTEBRAL DISC: ICD-10-CM

## 2018-09-17 ENCOUNTER — COMMUNICATION - HEALTHEAST (OUTPATIENT)
Dept: FAMILY MEDICINE | Facility: CLINIC | Age: 48
End: 2018-09-17

## 2018-09-17 DIAGNOSIS — M62.838 MUSCLE SPASMS OF NECK: ICD-10-CM

## 2018-09-18 ENCOUNTER — COMMUNICATION - HEALTHEAST (OUTPATIENT)
Dept: FAMILY MEDICINE | Facility: CLINIC | Age: 48
End: 2018-09-18

## 2018-09-18 ENCOUNTER — COMMUNICATION - HEALTHEAST (OUTPATIENT)
Dept: SCHEDULING | Facility: CLINIC | Age: 48
End: 2018-09-18

## 2018-09-18 DIAGNOSIS — G93.5 BRAIN COMPRESSION (H): ICD-10-CM

## 2018-09-18 DIAGNOSIS — D32.9 MENINGIOMA (H): ICD-10-CM

## 2018-09-20 ENCOUNTER — OFFICE VISIT - HEALTHEAST (OUTPATIENT)
Dept: FAMILY MEDICINE | Facility: CLINIC | Age: 48
End: 2018-09-20

## 2018-09-20 DIAGNOSIS — Z79.899 CONTROLLED SUBSTANCE AGREEMENT SIGNED: ICD-10-CM

## 2018-09-20 DIAGNOSIS — J45.40 MODERATE PERSISTENT ASTHMA WITHOUT COMPLICATION: ICD-10-CM

## 2018-09-20 DIAGNOSIS — E22.2 SIADH (SYNDROME OF INAPPROPRIATE ADH PRODUCTION) (H): ICD-10-CM

## 2018-09-20 DIAGNOSIS — M54.41 ACUTE RIGHT-SIDED LOW BACK PAIN WITH RIGHT-SIDED SCIATICA: ICD-10-CM

## 2018-09-20 LAB
ALBUMIN SERPL-MCNC: 4.3 G/DL (ref 3.5–5)
ALP SERPL-CCNC: 102 U/L (ref 45–120)
ALT SERPL W P-5'-P-CCNC: 16 U/L (ref 0–45)
AMPHETAMINES UR QL SCN: ABNORMAL
ANION GAP SERPL CALCULATED.3IONS-SCNC: 13 MMOL/L (ref 5–18)
AST SERPL W P-5'-P-CCNC: 18 U/L (ref 0–40)
BARBITURATES UR QL: ABNORMAL
BENZODIAZ UR QL: ABNORMAL
BILIRUB SERPL-MCNC: 0.6 MG/DL (ref 0–1)
BUN SERPL-MCNC: 10 MG/DL (ref 8–22)
CALCIUM SERPL-MCNC: 9.8 MG/DL (ref 8.5–10.5)
CANNABINOIDS UR QL SCN: ABNORMAL
CHLORIDE BLD-SCNC: 100 MMOL/L (ref 98–107)
CO2 SERPL-SCNC: 23 MMOL/L (ref 22–31)
COCAINE UR QL: ABNORMAL
CREAT SERPL-MCNC: 0.71 MG/DL (ref 0.6–1.1)
CREAT UR-MCNC: 98.1 MG/DL
GFR SERPL CREATININE-BSD FRML MDRD: >60 ML/MIN/1.73M2
GLUCOSE BLD-MCNC: 80 MG/DL (ref 70–125)
OPIATES UR QL SCN: ABNORMAL
OXYCODONE UR QL: ABNORMAL
PCP UR QL SCN: ABNORMAL
POTASSIUM BLD-SCNC: 3.8 MMOL/L (ref 3.5–5)
PROT SERPL-MCNC: 8.1 G/DL (ref 6–8)
SODIUM SERPL-SCNC: 136 MMOL/L (ref 136–145)

## 2018-09-21 ENCOUNTER — COMMUNICATION - HEALTHEAST (OUTPATIENT)
Dept: FAMILY MEDICINE | Facility: CLINIC | Age: 48
End: 2018-09-21

## 2018-10-08 ENCOUNTER — COMMUNICATION - HEALTHEAST (OUTPATIENT)
Dept: FAMILY MEDICINE | Facility: CLINIC | Age: 48
End: 2018-10-08

## 2018-10-08 DIAGNOSIS — K21.9 GASTROESOPHAGEAL REFLUX DISEASE WITHOUT ESOPHAGITIS: ICD-10-CM

## 2018-10-11 ENCOUNTER — HOSPITAL ENCOUNTER (OUTPATIENT)
Dept: MAMMOGRAPHY | Facility: CLINIC | Age: 48
Discharge: HOME | End: 2018-10-11
Attending: NURSE PRACTITIONER

## 2018-10-11 ENCOUNTER — TRANSFERRED RECORDS (OUTPATIENT)
Dept: MULTI SPECIALTY CLINIC | Facility: CLINIC | Age: 48
End: 2018-10-11

## 2018-10-11 DIAGNOSIS — Z12.31 VISIT FOR SCREENING MAMMOGRAM: ICD-10-CM

## 2018-10-24 ENCOUNTER — OFFICE VISIT - HEALTHEAST (OUTPATIENT)
Dept: FAMILY MEDICINE | Facility: CLINIC | Age: 48
End: 2018-10-24

## 2018-10-24 DIAGNOSIS — Z79.899 CONTROLLED SUBSTANCE AGREEMENT SIGNED: ICD-10-CM

## 2018-10-24 DIAGNOSIS — D32.9 MENINGIOMA (H): ICD-10-CM

## 2018-10-24 DIAGNOSIS — M62.838 MUSCLE SPASMS OF NECK: ICD-10-CM

## 2018-10-24 DIAGNOSIS — G93.5 BRAIN COMPRESSION (H): ICD-10-CM

## 2018-10-24 LAB
AMPHETAMINES UR QL SCN: ABNORMAL
BARBITURATES UR QL: ABNORMAL
BENZODIAZ UR QL: ABNORMAL
CANNABINOIDS UR QL SCN: ABNORMAL
COCAINE UR QL: ABNORMAL
CREAT UR-MCNC: 78.5 MG/DL
METHADONE UR QL SCN: ABNORMAL
OPIATES UR QL SCN: ABNORMAL
OXYCODONE UR QL: ABNORMAL
PCP UR QL SCN: ABNORMAL

## 2018-10-30 ENCOUNTER — AMBULATORY - HEALTHEAST (OUTPATIENT)
Dept: FAMILY MEDICINE | Facility: CLINIC | Age: 48
End: 2018-10-30

## 2018-10-30 ENCOUNTER — COMMUNICATION - HEALTHEAST (OUTPATIENT)
Dept: FAMILY MEDICINE | Facility: CLINIC | Age: 48
End: 2018-10-30

## 2018-10-30 DIAGNOSIS — Z79.899 CONTROLLED SUBSTANCE AGREEMENT SIGNED: ICD-10-CM

## 2018-11-09 ENCOUNTER — COMMUNICATION - HEALTHEAST (OUTPATIENT)
Dept: FAMILY MEDICINE | Facility: CLINIC | Age: 48
End: 2018-11-09

## 2018-11-09 DIAGNOSIS — J45.909 ASTHMA: ICD-10-CM

## 2018-11-13 ENCOUNTER — COMMUNICATION - HEALTHEAST (OUTPATIENT)
Dept: FAMILY MEDICINE | Facility: CLINIC | Age: 48
End: 2018-11-13

## 2018-11-13 DIAGNOSIS — M50.20 PROTRUSION OF CERVICAL INTERVERTEBRAL DISC: ICD-10-CM

## 2018-11-17 ENCOUNTER — RECORDS - HEALTHEAST (OUTPATIENT)
Dept: ADMINISTRATIVE | Facility: OTHER | Age: 48
End: 2018-11-17

## 2018-11-19 ENCOUNTER — COMMUNICATION - HEALTHEAST (OUTPATIENT)
Dept: FAMILY MEDICINE | Facility: CLINIC | Age: 48
End: 2018-11-19
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
## 2018-11-20 ENCOUNTER — COMMUNICATION - HEALTHEAST (OUTPATIENT)
Dept: FAMILY MEDICINE | Facility: CLINIC | Age: 48
End: 2018-11-20

## 2018-11-20 DIAGNOSIS — D32.9 MENINGIOMA (H): ICD-10-CM

## 2018-11-20 DIAGNOSIS — G93.5 BRAIN COMPRESSION (H): ICD-10-CM

## 2018-11-26 ENCOUNTER — COMMUNICATION - HEALTHEAST (OUTPATIENT)
Dept: FAMILY MEDICINE | Facility: CLINIC | Age: 48
End: 2018-11-26

## 2018-11-26 DIAGNOSIS — G93.5 BRAIN COMPRESSION (H): ICD-10-CM

## 2018-11-26 DIAGNOSIS — D32.9 MENINGIOMA (H): ICD-10-CM

## 2018-12-04 ENCOUNTER — COMMUNICATION - HEALTHEAST (OUTPATIENT)
Dept: FAMILY MEDICINE | Facility: CLINIC | Age: 48
End: 2018-12-04

## 2018-12-04 DIAGNOSIS — M62.838 MUSCLE SPASMS OF NECK: ICD-10-CM

## 2018-12-10 ENCOUNTER — COMMUNICATION - HEALTHEAST (OUTPATIENT)
Dept: FAMILY MEDICINE | Facility: CLINIC | Age: 48
End: 2018-12-10

## 2018-12-10 DIAGNOSIS — J45.901 ASTHMA EXACERBATION: ICD-10-CM

## 2018-12-13 ENCOUNTER — COMMUNICATION - HEALTHEAST (OUTPATIENT)
Dept: FAMILY MEDICINE | Facility: CLINIC | Age: 48
End: 2018-12-13

## 2018-12-13 DIAGNOSIS — M50.20 PROTRUSION OF CERVICAL INTERVERTEBRAL DISC: ICD-10-CM

## 2018-12-15 ENCOUNTER — COMMUNICATION - HEALTHEAST (OUTPATIENT)
Dept: FAMILY MEDICINE | Facility: CLINIC | Age: 48
End: 2018-12-15

## 2018-12-15 DIAGNOSIS — M50.20 PROTRUSION OF CERVICAL INTERVERTEBRAL DISC: ICD-10-CM

## 2018-12-28 ENCOUNTER — OFFICE VISIT - HEALTHEAST (OUTPATIENT)
Dept: FAMILY MEDICINE | Facility: CLINIC | Age: 48
End: 2018-12-28

## 2018-12-28 ENCOUNTER — COMMUNICATION - HEALTHEAST (OUTPATIENT)
Dept: FAMILY MEDICINE | Facility: CLINIC | Age: 48
End: 2018-12-28

## 2018-12-28 DIAGNOSIS — F11.20 CONTINUOUS OPIOID DEPENDENCE (H): ICD-10-CM

## 2018-12-28 DIAGNOSIS — M50.20 PROTRUSION OF CERVICAL INTERVERTEBRAL DISC: ICD-10-CM

## 2018-12-28 DIAGNOSIS — G93.5 BRAIN COMPRESSION (H): ICD-10-CM

## 2018-12-28 DIAGNOSIS — N32.81 OVERACTIVE BLADDER: ICD-10-CM

## 2018-12-28 DIAGNOSIS — D32.9 MENINGIOMA (H): ICD-10-CM

## 2018-12-28 LAB
ALBUMIN UR-MCNC: NEGATIVE MG/DL
AMPHETAMINES UR QL SCN: ABNORMAL
APPEARANCE UR: CLEAR
BARBITURATES UR QL: ABNORMAL
BENZODIAZ UR QL: ABNORMAL
BILIRUB UR QL STRIP: NEGATIVE
CANNABINOIDS UR QL SCN: ABNORMAL
COCAINE UR QL: ABNORMAL
COLOR UR AUTO: YELLOW
CREAT UR-MCNC: 70.1 MG/DL
GLUCOSE UR STRIP-MCNC: NEGATIVE MG/DL
HGB UR QL STRIP: NEGATIVE
KETONES UR STRIP-MCNC: NEGATIVE MG/DL
LEUKOCYTE ESTERASE UR QL STRIP: NEGATIVE
METHADONE UR QL SCN: ABNORMAL
NITRATE UR QL: NEGATIVE
OPIATES UR QL SCN: ABNORMAL
OXYCODONE UR QL: ABNORMAL
PCP UR QL SCN: ABNORMAL
PH UR STRIP: 7 [PH] (ref 5–8)
SP GR UR STRIP: 1.01 (ref 1–1.03)
UROBILINOGEN UR STRIP-ACNC: NORMAL

## 2019-01-02 ENCOUNTER — COMMUNICATION - HEALTHEAST (OUTPATIENT)
Dept: FAMILY MEDICINE | Facility: CLINIC | Age: 49
End: 2019-01-02

## 2019-01-21 ENCOUNTER — COMMUNICATION - HEALTHEAST (OUTPATIENT)
Dept: FAMILY MEDICINE | Facility: CLINIC | Age: 49
End: 2019-01-21

## 2019-01-21 DIAGNOSIS — G93.5 BRAIN COMPRESSION (H): ICD-10-CM

## 2019-01-21 DIAGNOSIS — D32.9 MENINGIOMA (H): ICD-10-CM

## 2019-01-23 ENCOUNTER — COMMUNICATION - HEALTHEAST (OUTPATIENT)
Dept: FAMILY MEDICINE | Facility: CLINIC | Age: 49
End: 2019-01-23

## 2019-01-23 DIAGNOSIS — M62.838 MUSCLE SPASMS OF NECK: ICD-10-CM

## 2019-02-01 ENCOUNTER — COMMUNICATION - HEALTHEAST (OUTPATIENT)
Dept: FAMILY MEDICINE | Facility: CLINIC | Age: 49
End: 2019-02-01

## 2019-02-01 DIAGNOSIS — J45.901 ASTHMA EXACERBATION: ICD-10-CM

## 2019-02-03 ENCOUNTER — COMMUNICATION - HEALTHEAST (OUTPATIENT)
Dept: FAMILY MEDICINE | Facility: CLINIC | Age: 49
End: 2019-02-03

## 2019-02-03 DIAGNOSIS — M50.20 PROTRUSION OF CERVICAL INTERVERTEBRAL DISC: ICD-10-CM

## 2019-02-18 ENCOUNTER — COMMUNICATION - HEALTHEAST (OUTPATIENT)
Dept: FAMILY MEDICINE | Facility: CLINIC | Age: 49
End: 2019-02-18

## 2019-02-18 DIAGNOSIS — D32.9 MENINGIOMA (H): ICD-10-CM

## 2019-02-18 DIAGNOSIS — G93.5 BRAIN COMPRESSION (H): ICD-10-CM

## 2019-02-22 ENCOUNTER — COMMUNICATION - HEALTHEAST (OUTPATIENT)
Dept: FAMILY MEDICINE | Facility: CLINIC | Age: 49
End: 2019-02-22

## 2019-02-22 DIAGNOSIS — M62.838 MUSCLE SPASMS OF NECK: ICD-10-CM

## 2019-03-07 ENCOUNTER — COMMUNICATION - HEALTHEAST (OUTPATIENT)
Dept: FAMILY MEDICINE | Facility: CLINIC | Age: 49
End: 2019-03-07

## 2019-03-07 DIAGNOSIS — M50.20 PROTRUSION OF CERVICAL INTERVERTEBRAL DISC: ICD-10-CM

## 2019-03-17 ENCOUNTER — COMMUNICATION - HEALTHEAST (OUTPATIENT)
Dept: FAMILY MEDICINE | Facility: CLINIC | Age: 49
End: 2019-03-17

## 2019-03-17 DIAGNOSIS — D32.9 MENINGIOMA (H): ICD-10-CM

## 2019-03-17 DIAGNOSIS — G93.5 BRAIN COMPRESSION (H): ICD-10-CM

## 2019-03-18 ENCOUNTER — COMMUNICATION - HEALTHEAST (OUTPATIENT)
Dept: NEUROSURGERY | Facility: CLINIC | Age: 49
End: 2019-03-18

## 2019-03-20 ENCOUNTER — COMMUNICATION - HEALTHEAST (OUTPATIENT)
Dept: FAMILY MEDICINE | Facility: CLINIC | Age: 49
End: 2019-03-20

## 2019-03-28 ENCOUNTER — COMMUNICATION - HEALTHEAST (OUTPATIENT)
Dept: FAMILY MEDICINE | Facility: CLINIC | Age: 49
End: 2019-03-28

## 2019-03-28 DIAGNOSIS — M62.838 MUSCLE SPASMS OF NECK: ICD-10-CM

## 2019-04-04 ENCOUNTER — OFFICE VISIT - HEALTHEAST (OUTPATIENT)
Dept: FAMILY MEDICINE | Facility: CLINIC | Age: 49
End: 2019-04-04

## 2019-04-04 DIAGNOSIS — D32.9 MENINGIOMA (H): ICD-10-CM

## 2019-04-04 DIAGNOSIS — M50.20 PROTRUSION OF CERVICAL INTERVERTEBRAL DISC: ICD-10-CM

## 2019-04-04 DIAGNOSIS — R11.0 NAUSEA: ICD-10-CM

## 2019-04-04 DIAGNOSIS — I10 ESSENTIAL HYPERTENSION: ICD-10-CM

## 2019-04-04 DIAGNOSIS — G93.5 BRAIN COMPRESSION (H): ICD-10-CM

## 2019-04-04 DIAGNOSIS — K21.9 GASTROESOPHAGEAL REFLUX DISEASE WITHOUT ESOPHAGITIS: ICD-10-CM

## 2019-04-05 ENCOUNTER — COMMUNICATION - HEALTHEAST (OUTPATIENT)
Dept: FAMILY MEDICINE | Facility: CLINIC | Age: 49
End: 2019-04-05

## 2019-04-09 ENCOUNTER — HOSPITAL ENCOUNTER (OUTPATIENT)
Dept: MRI IMAGING | Facility: CLINIC | Age: 49
Discharge: HOME | End: 2019-04-09
Attending: NEUROLOGICAL SURGERY

## 2019-04-09 DIAGNOSIS — D32.9 MENINGIOMA (H): ICD-10-CM

## 2019-04-17 ENCOUNTER — RECORDS - HEALTHEAST (OUTPATIENT)
Dept: ADMINISTRATIVE | Facility: OTHER | Age: 49
End: 2019-04-17

## 2019-04-24 ENCOUNTER — RECORDS - HEALTHEAST (OUTPATIENT)
Dept: ADMINISTRATIVE | Facility: OTHER | Age: 49
End: 2019-04-24

## 2019-04-24 ENCOUNTER — COMMUNICATION - HEALTHEAST (OUTPATIENT)
Dept: FAMILY MEDICINE | Facility: CLINIC | Age: 49
End: 2019-04-24

## 2019-04-24 DIAGNOSIS — R11.0 NAUSEA: ICD-10-CM

## 2019-04-25 ENCOUNTER — COMMUNICATION - HEALTHEAST (OUTPATIENT)
Dept: FAMILY MEDICINE | Facility: CLINIC | Age: 49
End: 2019-04-25

## 2019-04-25 DIAGNOSIS — M62.838 MUSCLE SPASMS OF NECK: ICD-10-CM

## 2019-04-26 ENCOUNTER — OFFICE VISIT - HEALTHEAST (OUTPATIENT)
Dept: NEUROSURGERY | Facility: CLINIC | Age: 49
End: 2019-04-26

## 2019-04-26 DIAGNOSIS — D32.9 MENINGIOMA (H): ICD-10-CM

## 2019-04-26 DIAGNOSIS — R52 PAIN: ICD-10-CM

## 2019-04-26 ASSESSMENT — MIFFLIN-ST. JEOR: SCORE: 1177.88

## 2019-05-07 ENCOUNTER — COMMUNICATION - HEALTHEAST (OUTPATIENT)
Dept: FAMILY MEDICINE | Facility: CLINIC | Age: 49
End: 2019-05-07

## 2019-05-07 DIAGNOSIS — J45.909 ASTHMA: ICD-10-CM

## 2019-05-07 DIAGNOSIS — R11.0 NAUSEA: ICD-10-CM

## 2019-05-09 ENCOUNTER — RECORDS - HEALTHEAST (OUTPATIENT)
Dept: ADMINISTRATIVE | Facility: OTHER | Age: 49
End: 2019-05-09

## 2019-05-11 ENCOUNTER — COMMUNICATION - HEALTHEAST (OUTPATIENT)
Dept: FAMILY MEDICINE | Facility: CLINIC | Age: 49
End: 2019-05-11

## 2019-05-11 DIAGNOSIS — M50.20 PROTRUSION OF CERVICAL INTERVERTEBRAL DISC: ICD-10-CM

## 2019-05-17 ENCOUNTER — COMMUNICATION - HEALTHEAST (OUTPATIENT)
Dept: SCHEDULING | Facility: CLINIC | Age: 49
End: 2019-05-17

## 2019-05-17 ENCOUNTER — RECORDS - HEALTHEAST (OUTPATIENT)
Dept: ADMINISTRATIVE | Facility: OTHER | Age: 49
End: 2019-05-17

## 2019-05-21 ENCOUNTER — OFFICE VISIT - HEALTHEAST (OUTPATIENT)
Dept: FAMILY MEDICINE | Facility: CLINIC | Age: 49
End: 2019-05-21

## 2019-05-21 DIAGNOSIS — Z91.030 BEE ALLERGY STATUS: ICD-10-CM

## 2019-05-21 DIAGNOSIS — Z87.440 HISTORY OF UTI: ICD-10-CM

## 2019-05-21 DIAGNOSIS — D32.9 MENINGIOMA (H): ICD-10-CM

## 2019-05-21 DIAGNOSIS — G93.5 BRAIN COMPRESSION (H): ICD-10-CM

## 2019-05-21 DIAGNOSIS — R11.0 NAUSEA: ICD-10-CM

## 2019-05-28 ENCOUNTER — COMMUNICATION - HEALTHEAST (OUTPATIENT)
Dept: SCHEDULING | Facility: CLINIC | Age: 49
End: 2019-05-28

## 2019-06-04 ENCOUNTER — COMMUNICATION - HEALTHEAST (OUTPATIENT)
Dept: SCHEDULING | Facility: CLINIC | Age: 49
End: 2019-06-04

## 2019-06-05 ENCOUNTER — COMMUNICATION - HEALTHEAST (OUTPATIENT)
Dept: FAMILY MEDICINE | Facility: CLINIC | Age: 49
End: 2019-06-05

## 2019-06-05 DIAGNOSIS — K21.9 GASTROESOPHAGEAL REFLUX DISEASE WITHOUT ESOPHAGITIS: ICD-10-CM

## 2019-06-08 ENCOUNTER — COMMUNICATION - HEALTHEAST (OUTPATIENT)
Dept: SCHEDULING | Facility: CLINIC | Age: 49
End: 2019-06-08

## 2019-06-30 ENCOUNTER — COMMUNICATION - HEALTHEAST (OUTPATIENT)
Dept: FAMILY MEDICINE | Facility: CLINIC | Age: 49
End: 2019-06-30

## 2019-06-30 DIAGNOSIS — M62.838 MUSCLE SPASMS OF NECK: ICD-10-CM

## 2019-07-15 ENCOUNTER — COMMUNICATION - HEALTHEAST (OUTPATIENT)
Dept: FAMILY MEDICINE | Facility: CLINIC | Age: 49
End: 2019-07-15

## 2019-07-15 DIAGNOSIS — G93.5 BRAIN COMPRESSION (H): ICD-10-CM

## 2019-07-15 DIAGNOSIS — F11.90 CHRONIC, CONTINUOUS USE OF OPIOIDS: ICD-10-CM

## 2019-07-15 DIAGNOSIS — D32.9 MENINGIOMA (H): ICD-10-CM

## 2019-07-22 ENCOUNTER — COMMUNICATION - HEALTHEAST (OUTPATIENT)
Dept: FAMILY MEDICINE | Facility: CLINIC | Age: 49
End: 2019-07-22

## 2019-07-22 DIAGNOSIS — M50.20 PROTRUSION OF CERVICAL INTERVERTEBRAL DISC: ICD-10-CM

## 2019-07-26 ENCOUNTER — COMMUNICATION - HEALTHEAST (OUTPATIENT)
Dept: FAMILY MEDICINE | Facility: CLINIC | Age: 49
End: 2019-07-26

## 2019-07-26 DIAGNOSIS — Z91.09 ENVIRONMENTAL ALLERGIES: ICD-10-CM

## 2019-08-13 ENCOUNTER — COMMUNICATION - HEALTHEAST (OUTPATIENT)
Dept: FAMILY MEDICINE | Facility: CLINIC | Age: 49
End: 2019-08-13

## 2019-08-13 DIAGNOSIS — M62.838 MUSCLE SPASMS OF NECK: ICD-10-CM

## 2019-09-04 ENCOUNTER — COMMUNICATION - HEALTHEAST (OUTPATIENT)
Dept: FAMILY MEDICINE | Facility: CLINIC | Age: 49
End: 2019-09-04

## 2019-09-04 DIAGNOSIS — M50.20 PROTRUSION OF CERVICAL INTERVERTEBRAL DISC: ICD-10-CM

## 2019-09-17 ENCOUNTER — COMMUNICATION - HEALTHEAST (OUTPATIENT)
Dept: FAMILY MEDICINE | Facility: CLINIC | Age: 49
End: 2019-09-17

## 2019-09-17 DIAGNOSIS — M62.838 MUSCLE SPASMS OF NECK: ICD-10-CM

## 2019-10-10 ENCOUNTER — COMMUNICATION - HEALTHEAST (OUTPATIENT)
Dept: FAMILY MEDICINE | Facility: CLINIC | Age: 49
End: 2019-10-10

## 2019-10-10 DIAGNOSIS — I10 ESSENTIAL HYPERTENSION: ICD-10-CM

## 2019-10-23 ENCOUNTER — COMMUNICATION - HEALTHEAST (OUTPATIENT)
Dept: FAMILY MEDICINE | Facility: CLINIC | Age: 49
End: 2019-10-23

## 2019-10-23 DIAGNOSIS — M62.838 MUSCLE SPASMS OF NECK: ICD-10-CM

## 2019-11-04 ENCOUNTER — COMMUNICATION - HEALTHEAST (OUTPATIENT)
Dept: FAMILY MEDICINE | Facility: CLINIC | Age: 49
End: 2019-11-04

## 2019-11-04 DIAGNOSIS — M50.20 PROTRUSION OF CERVICAL INTERVERTEBRAL DISC: ICD-10-CM

## 2019-11-12 NOTE — PATIENT INSTRUCTIONS
schedule with pain specialist to take over pain management for you I do not feel comfortable doing this on an ongoing basis     Never drink alcohol with your medications    I will follow up with labs    I recommend establishing with an MD due to your complex history and needs    Please send us your potassium, sodium, and creatinine labs once you have them at [facility]
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
## 2019-11-25 ENCOUNTER — COMMUNICATION - HEALTHEAST (OUTPATIENT)
Dept: FAMILY MEDICINE | Facility: CLINIC | Age: 49
End: 2019-11-25

## 2019-11-25 DIAGNOSIS — M50.20 PROTRUSION OF CERVICAL INTERVERTEBRAL DISC: ICD-10-CM

## 2019-12-03 ENCOUNTER — OFFICE VISIT (OUTPATIENT)
Dept: FAMILY MEDICINE | Facility: CLINIC | Age: 49
End: 2019-12-03
Payer: MEDICARE

## 2019-12-03 VITALS
HEIGHT: 61 IN | OXYGEN SATURATION: 98 % | WEIGHT: 146 LBS | BODY MASS INDEX: 27.56 KG/M2 | RESPIRATION RATE: 14 BRPM | HEART RATE: 96 BPM | TEMPERATURE: 98.7 F | SYSTOLIC BLOOD PRESSURE: 95 MMHG | DIASTOLIC BLOOD PRESSURE: 63 MMHG

## 2019-12-03 DIAGNOSIS — E03.8 OTHER SPECIFIED HYPOTHYROIDISM: ICD-10-CM

## 2019-12-03 DIAGNOSIS — J45.40 MODERATE PERSISTENT ASTHMA WITHOUT COMPLICATION: ICD-10-CM

## 2019-12-03 DIAGNOSIS — K21.00 GASTROESOPHAGEAL REFLUX DISEASE WITH ESOPHAGITIS: ICD-10-CM

## 2019-12-03 DIAGNOSIS — G89.4 CHRONIC PAIN SYNDROME: Primary | ICD-10-CM

## 2019-12-03 DIAGNOSIS — E03.9 HYPOTHYROIDISM, UNSPECIFIED TYPE: ICD-10-CM

## 2019-12-03 DIAGNOSIS — G47.09 OTHER INSOMNIA: ICD-10-CM

## 2019-12-03 DIAGNOSIS — F31.9 BIPOLAR 1 DISORDER (H): ICD-10-CM

## 2019-12-03 DIAGNOSIS — Z23 NEED FOR PROPHYLACTIC VACCINATION AND INOCULATION AGAINST INFLUENZA: ICD-10-CM

## 2019-12-03 DIAGNOSIS — I10 BENIGN ESSENTIAL HYPERTENSION: ICD-10-CM

## 2019-12-03 DIAGNOSIS — R06.2 WHEEZING: ICD-10-CM

## 2019-12-03 DIAGNOSIS — Z86.018 HISTORY OF MENINGIOMA: ICD-10-CM

## 2019-12-03 LAB
BASOPHILS # BLD AUTO: 0.1 10E9/L (ref 0–0.2)
BASOPHILS NFR BLD AUTO: 0.8 %
DIFFERENTIAL METHOD BLD: ABNORMAL
EOSINOPHIL # BLD AUTO: 0.5 10E9/L (ref 0–0.7)
EOSINOPHIL NFR BLD AUTO: 6.5 %
ERYTHROCYTE [DISTWIDTH] IN BLOOD BY AUTOMATED COUNT: 15.7 % (ref 10–15)
HCT VFR BLD AUTO: 41.9 % (ref 35–47)
HGB BLD-MCNC: 13.4 G/DL (ref 11.7–15.7)
LYMPHOCYTES # BLD AUTO: 1.7 10E9/L (ref 0.8–5.3)
LYMPHOCYTES NFR BLD AUTO: 23.8 %
MCH RBC QN AUTO: 27.8 PG (ref 26.5–33)
MCHC RBC AUTO-ENTMCNC: 32 G/DL (ref 31.5–36.5)
MCV RBC AUTO: 87 FL (ref 78–100)
MONOCYTES # BLD AUTO: 0.6 10E9/L (ref 0–1.3)
MONOCYTES NFR BLD AUTO: 8.6 %
NEUTROPHILS # BLD AUTO: 4.3 10E9/L (ref 1.6–8.3)
NEUTROPHILS NFR BLD AUTO: 60.3 %
PLATELET # BLD AUTO: 415 10E9/L (ref 150–450)
RBC # BLD AUTO: 4.82 10E12/L (ref 3.8–5.2)
T4 FREE SERPL-MCNC: 0.78 NG/DL (ref 0.76–1.46)
TSH SERPL DL<=0.005 MIU/L-ACNC: 6.2 MU/L (ref 0.4–4)
WBC # BLD AUTO: 7.2 10E9/L (ref 4–11)

## 2019-12-03 PROCEDURE — 85025 COMPLETE CBC W/AUTO DIFF WBC: CPT | Performed by: PHYSICIAN ASSISTANT

## 2019-12-03 PROCEDURE — 90682 RIV4 VACC RECOMBINANT DNA IM: CPT | Performed by: PHYSICIAN ASSISTANT

## 2019-12-03 PROCEDURE — 84439 ASSAY OF FREE THYROXINE: CPT | Performed by: PHYSICIAN ASSISTANT

## 2019-12-03 PROCEDURE — 36415 COLL VENOUS BLD VENIPUNCTURE: CPT | Performed by: PHYSICIAN ASSISTANT

## 2019-12-03 PROCEDURE — 84443 ASSAY THYROID STIM HORMONE: CPT | Performed by: PHYSICIAN ASSISTANT

## 2019-12-03 PROCEDURE — G0008 ADMIN INFLUENZA VIRUS VAC: HCPCS | Performed by: PHYSICIAN ASSISTANT

## 2019-12-03 PROCEDURE — 99204 OFFICE O/P NEW MOD 45 MIN: CPT | Mod: 25 | Performed by: PHYSICIAN ASSISTANT

## 2019-12-03 RX ORDER — TRAMADOL HYDROCHLORIDE 50 MG/1
50-100 TABLET ORAL EVERY 6 HOURS PRN
Qty: 180 TABLET | Refills: 0 | Status: SHIPPED | OUTPATIENT
Start: 2019-12-03 | End: 2019-12-06

## 2019-12-03 RX ORDER — OMEPRAZOLE 40 MG/1
40 CAPSULE, DELAYED RELEASE ORAL DAILY
Qty: 30 CAPSULE | Refills: 2 | Status: SHIPPED | OUTPATIENT
Start: 2019-12-03 | End: 2019-12-05

## 2019-12-03 RX ORDER — FLUTICASONE PROPIONATE 50 MCG
1 SPRAY, SUSPENSION (ML) NASAL DAILY
Qty: 9.9 ML | Refills: 2 | Status: SHIPPED | OUTPATIENT
Start: 2019-12-03 | End: 2020-02-03

## 2019-12-03 RX ORDER — BACLOFEN 10 MG/1
TABLET ORAL
Qty: 90 TABLET | Refills: 1 | Status: SHIPPED | OUTPATIENT
Start: 2019-12-03 | End: 2020-07-20

## 2019-12-03 RX ORDER — FUROSEMIDE 20 MG
20 TABLET ORAL 2 TIMES DAILY PRN
COMMUNITY
Start: 2019-12-03 | End: 2023-06-12

## 2019-12-03 RX ORDER — LORAZEPAM 0.5 MG/1
0.5 TABLET ORAL EVERY 6 HOURS PRN
Qty: 30 TABLET | Refills: 0 | COMMUNITY
Start: 2019-12-03 | End: 2023-06-12

## 2019-12-03 RX ORDER — ALBUTEROL SULFATE 0.83 MG/ML
2.5 SOLUTION RESPIRATORY (INHALATION) EVERY 6 HOURS PRN
Qty: 90 VIAL | Refills: 1 | Status: SHIPPED | OUTPATIENT
Start: 2019-12-03 | End: 2019-12-05

## 2019-12-03 RX ORDER — EPINEPHRINE 0.3 MG/.3ML
0.3 INJECTION SUBCUTANEOUS PRN
COMMUNITY
Start: 2019-12-03 | End: 2022-06-12

## 2019-12-03 RX ORDER — ALBUTEROL SULFATE 90 UG/1
2 AEROSOL, METERED RESPIRATORY (INHALATION) EVERY 4 HOURS PRN
Qty: 1 INHALER | Refills: 2 | Status: SHIPPED | OUTPATIENT
Start: 2019-12-03 | End: 2020-05-20

## 2019-12-03 RX ORDER — LAMOTRIGINE 25 MG/1
50 TABLET ORAL DAILY
COMMUNITY
Start: 2019-12-03 | End: 2022-03-18

## 2019-12-03 RX ORDER — LISINOPRIL 10 MG/1
10 TABLET ORAL DAILY
Qty: 30 TABLET | Refills: 2 | Status: SHIPPED | OUTPATIENT
Start: 2019-12-03 | End: 2019-12-05

## 2019-12-03 RX ORDER — QUETIAPINE FUMARATE 200 MG/1
200 TABLET, FILM COATED ORAL DAILY
COMMUNITY

## 2019-12-03 RX ORDER — TRIAMCINOLONE ACETONIDE 1 MG/G
CREAM TOPICAL 2 TIMES DAILY
Qty: 60 G | Refills: 1 | COMMUNITY
Start: 2019-12-03

## 2019-12-03 ASSESSMENT — MIFFLIN-ST. JEOR: SCORE: 1218.59

## 2019-12-03 NOTE — LETTER
My Asthma Action Plan    Name: Amena Rosario   YOB: 1970  Date: 12/3/2019   My doctor: Daniella Molina PA-C   My clinic: [facility]        My Control Medicine: advair  My Rescue Medicine: Albuterol (Proair/Ventolin/Proventil HFA) 2-4 puffs EVERY 4 HOURS as needed. Use a spacer if recommended by your provider.  My Oral Steroid Medicine: none My Asthma Severity:   Moderate Persistent  Know your asthma triggers: smoke               GREEN ZONE   Good Control    I feel good    No cough or wheeze    Can work, sleep and play without asthma symptoms       Take your asthma control medicine every day.     1. If exercise triggers your asthma, take your rescue medication    15 minutes before exercise or sports, and    During exercise if you have asthma symptoms  2. Spacer to use with inhaler: If you have a spacer, make sure to use it with your inhaler             YELLOW ZONE Getting Worse  I have ANY of these:    I do not feel good    Cough or wheeze    Chest feels tight    Wake up at night   1. Keep taking your Green Zone medications  2. Start taking your rescue medicine:    every 20 minutes for up to 1 hour. Then every 4 hours for 24-48 hours.  3. If you stay in the Yellow Zone for more than 12-24 hours, contact your doctor.  4. If you do not return to the Green Zone in 12-24 hours or you get worse, start taking your oral steroid medicine if prescribed by your provider.           RED ZONE Medical Alert - Get Help  I have ANY of these:    I feel awful    Medicine is not helping    Breathing getting harder    Trouble walking or talking    Nose opens wide to breathe       1. Take your rescue medicine NOW  2. If your provider has prescribed an oral steroid medicine, start taking it NOW  3. Call your doctor NOW  4. If you are still in the Red Zone after 20 minutes and you have not reached your doctor:    Take your rescue medicine again and    Call 911 or go to the emergency room right  away    See your regular doctor within 2 weeks of an Emergency Room or Urgent Care visit for follow-up treatment.          Annual Reminders:  Meet with Asthma Educator,  Flu Shot in the Fall, consider Pneumonia Vaccination for patients with asthma (aged 19 and older).    Pharmacy:    [id number] - [place], [place] - [address]  [id number] - [place], [place] - [address]    Provider signature: Electronically Signed by Daniella Molina PA-C   Date: 12/03/19                      Asthma Triggers  How To Control Things That Make Your Asthma Worse    Triggers are things that make your asthma worse.  Look at the list below to help you find your triggers and what you can do about them.  You can help prevent asthma flare-ups by staying away from your triggers.      Trigger                                                          What you can do   Cigarette Smoke  Tobacco smoke can make asthma worse. Do not allow smoking in your home, car or around you.  Be sure no one smokes at a child s day care or school.  If you smoke, ask your health care provider for ways to help you quit.  Ask family members to quit too.  Ask your health care provider for a referral to Quit Plan to help you quit smoking, or call 5-117-297-PLAN.     Colds, Flu, Bronchitis  These are common triggers of asthma. Wash your hands often.  Don t touch your eyes, nose or mouth.  Get a flu shot every year.     Dust Mites  These are tiny bugs that live in cloth or carpet. They are too small to see. Wash sheets and blankets in hot water every week.   Encase pillows and mattress in dust mite proof covers.  Avoid having carpet if you can. If you have carpet, vacuum weekly.   Use a dust mask and HEPA vacuum.   Pollen and Outdoor Mold  Some people are allergic to trees, grass, or weed pollen, or molds. Try to keep your windows closed.  Limit time out doors when pollen count is high.   Ask you health care provider about taking  medicine during allergy season.     Animal Dander  Some people are allergic to skin flakes, urine or saliva from pets with fur or feathers. Keep pets with fur or feathers out of your home.    If you can t keep the pet outdoors, then keep the pet out of your bedroom.  Keep the bedroom door closed.  Keep pets off cloth furniture and away from stuffed toys.     Mice, Rats, and Cockroaches   Some people are allergic to the waste from these pests.   Cover food and garbage.  Clean up spills and food crumbs.  Store grease in the refrigerator.   Keep food out of the bedroom.   Indoor Mold  This can be a trigger if your home has high moisture. Fix leaking faucets, pipes, or other sources of water.   Clean moldy surfaces.  Dehumidify basement if it is damp and smelly.   Smoke, Strong Odors, and Sprays  These can reduce air quality. Stay away from strong odors and sprays, such as perfume, powder, hair spray, paints, smoke incense, paint, cleaning products, candles and new carpet.   Exercise or Sports  Some people with asthma have this trigger. Be active!  Ask your doctor about taking medicine before sports or exercise to prevent symptoms.    Warm up for 5-10 minutes before and after sports or exercise.     Other Triggers of Asthma  Cold air:  Cover your nose and mouth with a scarf.  Sometimes laughing or crying can be a trigger.  Some medicines and food can trigger asthma.

## 2019-12-03 NOTE — LETTER
[facility]  [address] [address] [facility] [address]  Phone: [phone]    December 3, 2019        Amena Rosario  [address]    [place] [address]          To whom it may concern:    RE: Amena Rosario    Patient was seen and treated today at our clinic. She needs a nebulizer for her asthma therefore her electricity should be kept on.      Please contact me for questions or concerns.      Sincerely,        Daniella Molina PA-C

## 2019-12-03 NOTE — LETTER
December 4, 2019    Amena Rosario  [address]    [place] [address]          Dear Amena,    It was a pleasure to see you at your recent office visit.  Your test results are listed below.  TSH hormone Is just slightly off but your T4 (actual thyroid hormone) is normal. I would not recommend thyroid medication at this time but I would get your levels checked yearly.  White and red blood cell counts normal, no anemia.         If you have any questions or concerns, please call the clinic at [phone].    Sincerely,  Daniella Molina PA-C    Results for orders placed or performed in visit on 12/03/19   TSH with free T4 reflex     Status: Abnormal   Result Value Ref Range    TSH 6.20 (H) 0.40 - 4.00 mU/L   CBC with platelets differential     Status: Abnormal   Result Value Ref Range    WBC 7.2 4.0 - 11.0 10e9/L    RBC Count 4.82 3.8 - 5.2 10e12/L    Hemoglobin 13.4 11.7 - 15.7 g/dL    Hematocrit 41.9 35.0 - 47.0 %    MCV 87 78 - 100 fl    MCH 27.8 26.5 - 33.0 pg    MCHC 32.0 31.5 - 36.5 g/dL    RDW 15.7 (H) 10.0 - 15.0 %    Platelet Count 415 150 - 450 10e9/L    % Neutrophils 60.3 %    % Lymphocytes 23.8 %    % Monocytes 8.6 %    % Eosinophils 6.5 %    % Basophils 0.8 %    Absolute Neutrophil 4.3 1.6 - 8.3 10e9/L    Absolute Lymphocytes 1.7 0.8 - 5.3 10e9/L    Absolute Monocytes 0.6 0.0 - 1.3 10e9/L    Absolute Eosinophils 0.5 0.0 - 0.7 10e9/L    Absolute Basophils 0.1 0.0 - 0.2 10e9/L    Diff Method Automated Method    T4 free     Status: None   Result Value Ref Range    T4 Free 0.78 0.76 - 1.46 ng/dL

## 2019-12-04 ASSESSMENT — ASTHMA QUESTIONNAIRES: ACT_TOTALSCORE: 25

## 2019-12-04 NOTE — RESULT ENCOUNTER NOTE
Dear Amena,      It was a pleasure to see you at your recent office visit.  Your test results are listed below.  TSH hormone Is just slightly off but your T4 (actual thyroid hormone) is normal. I would not recommend thyroid medication at this time but I would get your levels checked yearly.  White and red blood cell counts normal, no anemia.         If you have any questions or concerns, please call the clinic at [phone].    Sincerely,  Daniella Molina PA-C

## 2019-12-05 ENCOUNTER — TELEPHONE (OUTPATIENT)
Dept: FAMILY MEDICINE | Facility: CLINIC | Age: 49
End: 2019-12-05

## 2019-12-05 RX ORDER — ALBUTEROL SULFATE 0.83 MG/ML
2.5 SOLUTION RESPIRATORY (INHALATION) EVERY 6 HOURS PRN
Qty: 90 VIAL | Refills: 1 | Status: SHIPPED | OUTPATIENT
Start: 2019-12-05 | End: 2020-02-03

## 2019-12-05 RX ORDER — OMEPRAZOLE 40 MG/1
40 CAPSULE, DELAYED RELEASE ORAL DAILY
Qty: 30 CAPSULE | Refills: 2 | Status: SHIPPED | OUTPATIENT
Start: 2019-12-05 | End: 2020-07-07

## 2019-12-05 RX ORDER — LISINOPRIL 10 MG/1
10 TABLET ORAL DAILY
Qty: 30 TABLET | Refills: 2 | Status: SHIPPED | OUTPATIENT
Start: 2019-12-05 | End: 2020-06-17

## 2019-12-05 NOTE — TELEPHONE ENCOUNTER
Message from [facility] that tramadol prescription did not go through to pharmacy. I will call and confirm when they open.  [facility] [phone]  Hemalatha Jason BSN, RN

## 2020-01-22 ENCOUNTER — TELEPHONE (OUTPATIENT)
Dept: FAMILY MEDICINE | Facility: CLINIC | Age: 50
End: 2020-01-22

## 2020-01-30 DIAGNOSIS — G89.4 CHRONIC PAIN SYNDROME: ICD-10-CM

## 2020-01-31 NOTE — TELEPHONE ENCOUNTER
Controlled Substance Refill Request for Tramadol  Problem List Complete:  No     PROVIDER TO CONSIDER COMPLETION OF PROBLEM LIST AND OVERVIEW/CONTROLLED SUBSTANCE AGREEMENT    Last Written Prescription Date:  12/5/19  Last Fill Quantity: 180,   # refills: 0    THE MOST RECENT OFFICE VISIT MUST BE WITHIN THE PAST 3 MONTHS. AT LEAST ONE FACE TO FACE VISIT MUST OCCUR EVERY 6 MONTHS. ADDITIONAL VISITS CAN BE VIRTUAL.  (THIS STATEMENT SHOULD BE DELETED.)    Last Office Visit with [facility] primary care provider: 12/3/19    Future Office visit:     Controlled substance agreement:   Encounter-Level CSA:    There are no encounter-level csa.     Patient-Level CSA:    There are no patient-level csa.         Last Urine Drug Screen: No results found for: CDAUT, No results found for: COMDAT, No results found for: THC13, PCP13, COC13, MAMP13, OPI13, AMP13, BZO13, TCA13, MTD13, BAR13, OXY13, PPX13, BUP13     Processing:       [email or web address]      Pharmacy Monitoring Program, [place] checked 1/31/20  No additional medications since office visit 12/3/19  Kristel Niño RN

## 2020-02-03 RX ORDER — TRAMADOL HYDROCHLORIDE 50 MG/1
TABLET ORAL
Qty: 180 TABLET | Refills: 0 | OUTPATIENT
Start: 2020-02-03

## 2020-02-03 NOTE — TELEPHONE ENCOUNTER
Patient was to see pain clinic due to complexa pain and psych issues,  this was discussed 3 months ago in clinic. Declined.       Daniella

## 2020-02-25 ENCOUNTER — TELEPHONE (OUTPATIENT)
Dept: FAMILY MEDICINE | Facility: CLINIC | Age: 50
End: 2020-02-25

## 2020-02-25 DIAGNOSIS — M54.2 NECK PAIN: Primary | ICD-10-CM

## 2020-02-25 RX ORDER — DICLOFENAC SODIUM AND MISOPROSTOL 50; 200 MG/1; UG/1
1 TABLET, DELAYED RELEASE ORAL 2 TIMES DAILY
Qty: 60 TABLET | Refills: 0 | Status: SHIPPED | OUTPATIENT
Start: 2020-02-25 | End: 2020-07-20

## 2020-02-25 NOTE — TELEPHONE ENCOUNTER
Patient requesting to speak with triage nurse regarding her medication and neck issues. She has an appointment scheduled to see April on 3/10/20.

## 2020-02-25 NOTE — TELEPHONE ENCOUNTER
Patient informed of prescription sent to pharmacy and of provider's note below that provider she will be seeing my not prescribe controlled substances and may still need to go to pain clinic      Vee MCKEONN, RN, CPN

## 2020-02-25 NOTE — TELEPHONE ENCOUNTER
I sent that over. I would re-enforce to patient that a pain clinic is likely where most providers will recommend she be seen. She was on lots of narcotics previously  and I have concern with how she was at my appointment that she was displaying drug seeking behavior. You do not need to mention that to patient but just say it is not guaranteed any provider will give her controlled substances especially when she won't follow up with the plan to see a pain clinic.    I will CC this chart to April so she has a heads up to read into patients chart well ahead of seeing patient. I recommend reading my note where I saw her.     Daniella Molina PA-C

## 2020-02-25 NOTE — TELEPHONE ENCOUNTER
Patient saw Daniella Molina PA-C 12/3/19  Has been on tramadol for years, history of neck pain  Patient asking for prescription for Tramadol from April Bergeron who patient sees for the first time on 3/6  Patient wondering if will prescribe anything else for pain. Is currently taking Ibuprofen and Aleve with no relief. Has 1 tramadol left.     Per Daniella Molina PA-C note in December and will not prescribe pain meds    Patient refuses to follow up at pain clinic    Patient informed April will not prescribe meds without having seen her first    Patient wondering as she saw Daniella Molina PA-C in December if there is a stronger anti-inflammatory that can be prescribed at this time. Patient has had Arthrotec in the past and has helped. States cannot have Diclofenac    To provider to advise        Vee MCKEONN, RN, CPN

## 2020-02-25 NOTE — TELEPHONE ENCOUNTER
Patient has never been seen by April Bergeron.  Will forward to care team of provider that has seen patient.

## 2020-02-26 ENCOUNTER — TELEPHONE (OUTPATIENT)
Dept: FAMILY MEDICINE | Facility: CLINIC | Age: 50
End: 2020-02-26

## 2020-02-26 NOTE — TELEPHONE ENCOUNTER
Received faxed note from [facility], diclofenac-misoprostol (ARTHROTEC 50) 50-0.2 MG per EC tablet, is not covered, plan/benefit exclusion. Patient will need new RX.Sia Barclay MA/TC

## 2020-02-26 NOTE — TELEPHONE ENCOUNTER
Patient asked for that Rx so I wrote it as it is not a narcotic which is what she was asking for at our visit and was very upset when I wouldn't give her the quantity of what she wanted, I have concerns she is a narcotic seeker. She has an upcoming appt with another provider. There are no alternatives that are covered similar to this. She will need to f/u with a pain clinic as we already discussed for pain management/control recommendations.     Please do not message me again about this patient all questions so should be addressed by her next provider visit and she didn't f/u with my recommendation of seeing a pain specialist.          Daniella Molina PA-C

## 2020-02-27 NOTE — TELEPHONE ENCOUNTER
Pharmacy states cash price for prescription is $90.  Provider states she is declining to write any alternate prescription and pt should follow up with pain clinic.   Pt notified of this information.  Hemalatha MCKEONN, RN

## 2020-06-13 DIAGNOSIS — I10 BENIGN ESSENTIAL HYPERTENSION: ICD-10-CM

## 2020-06-15 NOTE — TELEPHONE ENCOUNTER
"Routing refill request to provider for review/approval because:  Requested Prescriptions   Pending Prescriptions Disp Refills    lisinopril (ZESTRIL) 10 MG tablet [Pharmacy Med Name: Lisinopril Oral Tablet 10 MG] 30 tablet 0     Sig: Take 1 tablet (10 mg) by mouth daily       ACE Inhibitors (Including Combos) Protocol Failed - 6/13/2020  8:29 PM        Failed - Normal serum creatinine on file in past 12 months     No lab results found.    Ok to refill medication if creatinine is low          Failed - Normal serum potassium on file in past 12 months     No lab results found.          Passed - Blood pressure under 140/90 in past 12 months     BP Readings from Last 3 Encounters:   12/03/19 95/63                 Passed - Recent (12 mo) or future (30 days) visit within the authorizing provider's specialty     Patient has had an office visit with the authorizing provider or a provider within the authorizing providers department within the previous 12 mos or has a future within next 30 days. See \"Patient Info\" tab in inbasket, or \"Choose Columns\" in Meds & Orders section of the refill encounter.              Passed - Medication is active on med list        Passed - Patient is age 18 or older        Passed - No active pregnancy on record        Passed - No positive pregnancy test within past 12 months               Vee MCKEONN, RN, CPN          "

## 2020-06-15 NOTE — TELEPHONE ENCOUNTER
Please call patient, I have no updated labs she was I believe going to have them done at [facility]. I need at least a BMP for this medication to be refilled. If she doesn't have the record I could order the blood to be drawn here.   I dont see it updated in care everywhere but maybe she just has to sign for it.     Daniella Molina PA-C

## 2020-06-16 RX ORDER — LISINOPRIL 10 MG/1
10 TABLET ORAL DAILY
Qty: 30 TABLET | Refills: 0 | OUTPATIENT
Start: 2020-06-16

## 2020-06-17 RX ORDER — LISINOPRIL 10 MG/1
10 TABLET ORAL DAILY
Qty: 30 TABLET | Refills: 0 | Status: SHIPPED | OUTPATIENT
Start: 2020-06-17 | End: 2020-07-14

## 2020-06-17 NOTE — TELEPHONE ENCOUNTER
Patient called back ,she never had labs done. Lab appointment made for next week. She needs a refill, can you please send in her lisinopril. Pended BMP.karla

## 2020-06-23 DIAGNOSIS — I10 BENIGN ESSENTIAL HYPERTENSION: ICD-10-CM

## 2020-06-23 LAB
ANION GAP SERPL CALCULATED.3IONS-SCNC: 5 MMOL/L (ref 3–14)
BUN SERPL-MCNC: 23 MG/DL (ref 7–30)
CALCIUM SERPL-MCNC: 8.9 MG/DL (ref 8.5–10.1)
CHLORIDE SERPL-SCNC: 110 MMOL/L (ref 94–109)
CO2 SERPL-SCNC: 27 MMOL/L (ref 20–32)
CREAT SERPL-MCNC: 0.91 MG/DL (ref 0.52–1.04)
GFR SERPL CREATININE-BSD FRML MDRD: 73 ML/MIN/{1.73_M2}
GLUCOSE SERPL-MCNC: 70 MG/DL (ref 70–99)
POTASSIUM SERPL-SCNC: 4.4 MMOL/L (ref 3.4–5.3)
SODIUM SERPL-SCNC: 142 MMOL/L (ref 133–144)

## 2020-06-23 PROCEDURE — 80048 BASIC METABOLIC PNL TOTAL CA: CPT | Performed by: PHYSICIAN ASSISTANT

## 2020-06-23 PROCEDURE — 36415 COLL VENOUS BLD VENIPUNCTURE: CPT | Performed by: PHYSICIAN ASSISTANT

## 2020-06-23 NOTE — LETTER
June 25, 2020      Amena Rosario  [address]    [place] [address]        Dear Amena,       It was a pleasure to see you at your recent office visit.  Your test results are listed below.  Sodium and potassium normal. Blood sugar (glucose) normal.  Creatinine and GFR normal, which means kidney function is normal.           If you have any questions or concerns, please call the clinic at [phone].     Sincerely,   Daniella Molina PA-C     Resulted Orders   **Basic metabolic panel FUTURE anytime   Result Value Ref Range    Sodium 142 133 - 144 mmol/L    Potassium 4.4 3.4 - 5.3 mmol/L    Chloride 110 (H) 94 - 109 mmol/L    Carbon Dioxide 27 20 - 32 mmol/L    Anion Gap 5 3 - 14 mmol/L    Glucose 70 70 - 99 mg/dL    Urea Nitrogen 23 7 - 30 mg/dL    Creatinine 0.91 0.52 - 1.04 mg/dL    GFR Estimate 73 >60 mL/min/[1.73_m2]      Comment:      Non  GFR Calc  Starting 12/18/2018, serum creatinine based estimated GFR (eGFR) will be   calculated using the Chronic Kidney Disease Epidemiology Collaboration   (CKD-EPI) equation.      GFR Estimate If Black 85 >60 mL/min/[1.73_m2]      Comment:       GFR Calc  Starting 12/18/2018, serum creatinine based estimated GFR (eGFR) will be   calculated using the Chronic Kidney Disease Epidemiology Collaboration   (CKD-EPI) equation.      Calcium 8.9 8.5 - 10.1 mg/dL

## 2020-06-25 NOTE — RESULT ENCOUNTER NOTE
Dear Amena,      It was a pleasure to see you at your recent office visit.  Your test results are listed below.  Sodium and potassium normal. Blood sugar (glucose) normal.  Creatinine and GFR normal, which means kidney function is normal.           If you have any questions or concerns, please call the clinic at [phone].    Sincerely,  Daniella Molina PA-C

## 2020-07-06 DIAGNOSIS — J45.40 MODERATE PERSISTENT ASTHMA WITHOUT COMPLICATION: ICD-10-CM

## 2020-07-06 RX ORDER — FLUTICASONE PROPIONATE 50 MCG
SPRAY, SUSPENSION (ML) NASAL
Qty: 16 G | Refills: 1 | Status: SHIPPED | OUTPATIENT
Start: 2020-07-06 | End: 2020-11-10

## 2020-07-06 NOTE — TELEPHONE ENCOUNTER
Prescription approved per [facility] Refill Protocol.  Kristel Niño RN

## 2020-07-07 ENCOUNTER — TELEPHONE (OUTPATIENT)
Dept: FAMILY MEDICINE | Facility: CLINIC | Age: 50
End: 2020-07-07

## 2020-07-07 DIAGNOSIS — K21.00 GASTROESOPHAGEAL REFLUX DISEASE WITH ESOPHAGITIS: ICD-10-CM

## 2020-07-07 RX ORDER — OMEPRAZOLE 40 MG/1
40 CAPSULE, DELAYED RELEASE ORAL DAILY
Qty: 30 CAPSULE | Refills: 2 | Status: SHIPPED | OUTPATIENT
Start: 2020-07-07 | End: 2020-11-25

## 2020-07-07 NOTE — TELEPHONE ENCOUNTER
Patient states she would like her lab results.  Ok to leave information on confidential voice mail.  Told may not here today since it is after 3:00.    Thank you.

## 2020-07-07 NOTE — TELEPHONE ENCOUNTER
Dear Amena,       It was a pleasure to see you at your recent office visit.  Your test results are listed below.  Sodium and potassium normal. Blood sugar (glucose) normal.  Creatinine and GFR normal, which means kidney function is normal.               If you have any questions or concerns, please call the clinic at [phone].       Sincerely,   Daniella Molina PA-C               Patient informed of result note of normal results. Patient verbalized understanding    Vee MCKEONN, RN, CPN

## 2020-07-09 DIAGNOSIS — J45.40 MODERATE PERSISTENT ASTHMA WITHOUT COMPLICATION: ICD-10-CM

## 2020-07-09 NOTE — TELEPHONE ENCOUNTER
Oswaldo Alberts  An Refill 3 minutes ago (11:17 AM)      Ian entered in the wrong request. It was supposed to be Fluticasone-Salmeterol not Fluticasone Spray. I have the fax.

## 2020-07-09 NOTE — TELEPHONE ENCOUNTER
In person or video, I think she was going to get a new primary but I dont see who she picked, calvin

## 2020-07-09 NOTE — LETTER
July 9, 2020    Amena Rosario  [address]    [place] [address]    Dear Amena,       We recently received a refill request for fluticasone-salmeterol (ADVAIR) 250-50 MCG/DOSE inhaler .  We were unable to refill this because you are due for a:    Asthma office visit or video visit.      Please call at your earliest convenience so that there will not be a delay with your future refills.          Thank you,   Your [facility] Team/  [phone]

## 2020-07-09 NOTE — TELEPHONE ENCOUNTER
Routing refill request to provider for review/approval because:  There is not a current, normal ACT on file in the last 6 months.  RN unable to refill medication.    Suzie Piña BSN, RN

## 2020-07-10 DIAGNOSIS — I10 BENIGN ESSENTIAL HYPERTENSION: ICD-10-CM

## 2020-07-10 RX ORDER — ALBUTEROL SULFATE 90 UG/1
AEROSOL, METERED RESPIRATORY (INHALATION)
Qty: 8.5 G | Refills: 0 | OUTPATIENT
Start: 2020-07-10

## 2020-07-13 DIAGNOSIS — I10 BENIGN ESSENTIAL HYPERTENSION: ICD-10-CM

## 2020-07-14 RX ORDER — LISINOPRIL 10 MG/1
10 TABLET ORAL DAILY
Qty: 30 TABLET | Refills: 0 | Status: SHIPPED | OUTPATIENT
Start: 2020-07-14 | End: 2020-09-10

## 2020-07-14 NOTE — TELEPHONE ENCOUNTER
Next 5 appointments (look out 90 days)    Jul 27, 2020  1:00 PM CDT  PHYSICAL with April Bergeron PA-C  [facility] [address]  [place] [address]  [phone]        Rx refilled per [facility] refill protocol.    Suzie Piña BSN, RN

## 2020-07-20 ENCOUNTER — VIRTUAL VISIT (OUTPATIENT)
Dept: FAMILY MEDICINE | Facility: CLINIC | Age: 50
End: 2020-07-20
Payer: MEDICARE

## 2020-07-20 DIAGNOSIS — J45.40 MODERATE PERSISTENT ASTHMA WITHOUT COMPLICATION: ICD-10-CM

## 2020-07-20 PROCEDURE — 99441 ZZC PHYSICIAN TELEPHONE EVALUATION 5-10 MIN: CPT | Performed by: FAMILY MEDICINE

## 2020-07-20 RX ORDER — BACLOFEN 10 MG/1
10 TABLET ORAL 2 TIMES DAILY
COMMUNITY
End: 2021-10-15

## 2020-07-20 RX ORDER — ALBUTEROL SULFATE 90 UG/1
AEROSOL, METERED RESPIRATORY (INHALATION)
Qty: 8.5 G | Refills: 3 | Status: SHIPPED | OUTPATIENT
Start: 2020-07-20 | End: 2020-11-30

## 2020-07-20 NOTE — PROGRESS NOTES
"Amena Rosario is a 49 year old female who is being evaluated via a billable telephone visit.      The patient has been notified of following:     \"This telephone visit will be conducted via a call between you and your physician/provider. We have found that certain health care needs can be provided without the need for a physical exam.  This service lets us provide the care you need with a short phone conversation.  If a prescription is necessary we can send it directly to your pharmacy.  If lab work is needed we can place an order for that and you can then stop by our lab to have the test done at a later time.    Telephone visits are billed at different rates depending on your insurance coverage. During this emergency period, for some insurers they may be billed the same as an in-person visit.  Please reach out to your insurance provider with any questions.    If during the course of the call the physician/provider feels a telephone visit is not appropriate, you will not be charged for this service.\"    Patient has given verbal consent for Telephone visit?  Yes    What phone number would you like to be contacted at? [phone]    How would you like to obtain your AVS? Mail a copy    Subjective     Amena Rosario is a 49 year old female who presents via phone visit today for the following health issues:    HPI    Asthma Follow-Up    Was ACT completed today?  No      Do you have a cough?  YES    Are you experiencing any wheezing in your chest?  YES    Do you have any shortness of breath?  YES     How often are you using a short-acting (rescue) inhaler or nebulizer, such as Albuterol?  right now she is out of Major so using Alburterol more    How many days per week do you miss taking your asthma controller medication?  I do not have an asthma controller medication/ she is out please refill    Please describe any recent triggers for your asthma: alleries/ dust mites/ allergic to everything    Have you had any " Emergency Room Visits, Urgent Care Visits, or Hospital Admissions since your last office visit?  no    Patient initiated a phone visit follow up on her asthma.   She is out of advair in the last couple weeks  She is using inhaler more   She needs refill of her inhalers  Medication;  Advair   Albuterol   No fever, chills, no chest pain . Little shortness of breath   She has wheezing this morning,      Patient Active Problem List   Diagnosis     History of meningioma     Other specified hypothyroidism     Wheezing     Other insomnia     Bipolar 1 disorder (H)     Moderate persistent asthma without complication     Chronic pain syndrome     Benign essential hypertension     Gastroesophageal reflux disease with esophagitis     Past Surgical History:   Procedure Laterality Date     BRAIN TUMOR RESECTION  10/31/2017    Brain tumor surgery       Social History     Tobacco Use     Smoking status: Current Some Day Smoker     Smokeless tobacco: Never Used     Tobacco comment: E-CIG   Substance Use Topics     Alcohol use: Not Currently     Family History   Problem Relation Age of Onset     Cancer Father          Current Outpatient Medications   Medication Sig Dispense Refill     albuterol (PROVENTIL) (2.5 MG/3ML) 0.083% neb solution Take 1 vial (2.5 mg) by nebulization every 6 hours as needed for shortness of breath / dyspnea or wheezing 90 vial 1     fluticasone (FLONASE) 50 MCG/ACT nasal spray Spray 1 spray into both nostrils by intranasal route daily 16 g 1     fluticasone-salmeterol (ADVAIR) 250-50 MCG/DOSE inhaler Inhale 1 puff into the lungs every 12 hours 1 Inhaler 2     lamoTRIgine (LAMICTAL) 25 MG tablet Take 2 tablets (50 mg) by mouth daily       lisinopril (ZESTRIL) 10 MG tablet Take 1 tablet (10 mg) by mouth daily 30 tablet 0     LORazepam (ATIVAN) 0.5 MG tablet Take 1 tablet (0.5 mg) by mouth every 6 hours as needed for anxiety 30 tablet 0     omeprazole (PRILOSEC) 40 MG DR capsule Take 1 capsule (40 mg) by mouth  daily 30 capsule 2     PROAIR  (90 Base) MCG/ACT inhaler INHALE 2 PUFFS INTO THE LUNGS EVERY 4 HOURS AS NEEDED FOR SHORTNESS OF BREATH/DYSPNEA OR WHEEZING 8.5 g 0     QUEtiapine (SEROQUEL) 200 MG tablet Take 200 mg by mouth daily       triamcinolone (KENALOG) 0.1 % external cream Apply topically 2 times daily 60 g 1     baclofen (LIORESAL) 10 MG tablet Take 10 mg by mouth 2 times daily       EPINEPHrine (EPIPEN/ADRENACLICK/OR ANY BX GENERIC EQUIV) 0.3 MG/0.3ML injection 2-pack Inject 0.3 mLs (0.3 mg) into the muscle as needed for anaphylaxis       furosemide (LASIX) 20 MG tablet Take 1 tablet (20 mg) by mouth 2 times daily as needed (swelling)       Allergies   Allergen Reactions     Morphine Other (See Comments)     Throat tightness       Aspirin Nausea and Vomiting     Diphenhydramine Other (See Comments)     irritate her asthma     Propoxyphene N-Apap Nausea and Other (See Comments)     abdominla pain and nuasea       Carisoprodol Rash     Hydromorphone Nausea and Vomiting     Recent Labs   Lab Test 06/23/20  1317 12/03/19  1355   CR 0.91  --    GFRESTIMATED 73  --    GFRESTBLACK 85  --    POTASSIUM 4.4  --    TSH  --  6.20*      BP Readings from Last 3 Encounters:   12/03/19 95/63    Wt Readings from Last 3 Encounters:   12/03/19 66.2 kg (146 lb)                    Reviewed and updated as needed this visit by Provider  Tobacco  Allergies  Meds  Problems  Med Hx  Surg Hx  Fam Hx         Review of Systems   Constitutional, HEENT, cardiovascular, pulmonary, gi and gu systems are negative, except as otherwise noted.       Objective   Reported vitals:  There were no vitals taken for this visit.   healthy, alert and no distress  PSYCH: Alert and oriented times 3; coherent speech, normal   rate and volume, able to articulate logical thoughts, able   to abstract reason, no tangential thoughts, no hallucinations   or delusions  Her affect is normal  RESP: No cough, no audible wheezing, able to talk in full  sentences  Remainder of exam unable to be completed due to telephone visits            Assessment/Plan:    1. Moderate persistent asthma without complication  Patient initiated a phone visit follow up on her asthma.   She is out of advair in the last couple weeks  She is using inhaler more   She needs refill of her inhalers  Medication;  Advair   Albuterol   No fever, chills, no chest pain . Little shortness of breath   Refill provided on Yossi and albuterol   Patient verbalized understanding and agreed on the plan of care. All questions answered.     - albuterol (PROAIR HFA) 108 (90 Base) MCG/ACT inhaler; INHALE 2 PUFFS INTO THE LUNGS EVERY 4 HOURS AS NEEDED FOR SHORTNESS OF BREATH/DYSPNEA OR WHEEZING  Dispense: 8.5 g; Refill: 3  - fluticasone-salmeterol (ADVAIR) 250-50 MCG/DOSE inhaler; Inhale 1 puff into the lungs every 12 hours  Dispense: 1 Inhaler; Refill: 3    Return in about 6 months (around 1/20/2021), or if symptoms worsen or fail to improve.      Phone call duration:  5 minutes    Murali Sheldon MD

## 2020-07-22 ENCOUNTER — COMMUNICATION - HEALTHEAST (OUTPATIENT)
Dept: FAMILY MEDICINE | Facility: CLINIC | Age: 50
End: 2020-07-22

## 2020-07-22 DIAGNOSIS — Z91.030 BEE ALLERGY STATUS: ICD-10-CM

## 2020-07-27 ENCOUNTER — OFFICE VISIT (OUTPATIENT)
Dept: FAMILY MEDICINE | Facility: CLINIC | Age: 50
End: 2020-07-27
Payer: MEDICARE

## 2020-07-27 VITALS
RESPIRATION RATE: 14 BRPM | SYSTOLIC BLOOD PRESSURE: 112 MMHG | OXYGEN SATURATION: 99 % | TEMPERATURE: 97.8 F | WEIGHT: 159 LBS | DIASTOLIC BLOOD PRESSURE: 70 MMHG | BODY MASS INDEX: 30.02 KG/M2 | HEIGHT: 61 IN | HEART RATE: 82 BPM

## 2020-07-27 DIAGNOSIS — Z12.4 SCREENING FOR MALIGNANT NEOPLASM OF CERVIX: ICD-10-CM

## 2020-07-27 DIAGNOSIS — Z00.00 ROUTINE GENERAL MEDICAL EXAMINATION AT A HEALTH CARE FACILITY: Primary | ICD-10-CM

## 2020-07-27 DIAGNOSIS — Z12.31 ENCOUNTER FOR SCREENING MAMMOGRAM FOR BREAST CANCER: ICD-10-CM

## 2020-07-27 DIAGNOSIS — B35.1 ONYCHOMYCOSIS: ICD-10-CM

## 2020-07-27 DIAGNOSIS — Z12.11 COLON CANCER SCREENING: ICD-10-CM

## 2020-07-27 DIAGNOSIS — E03.8 OTHER SPECIFIED HYPOTHYROIDISM: ICD-10-CM

## 2020-07-27 DIAGNOSIS — N95.1 MENOPAUSAL SYNDROME (HOT FLASHES): ICD-10-CM

## 2020-07-27 PROBLEM — Z87.42 HISTORY OF ABNORMAL CERVICAL PAP SMEAR: Status: ACTIVE | Noted: 2020-07-27

## 2020-07-27 PROCEDURE — G0101 CA SCREEN;PELVIC/BREAST EXAM: HCPCS | Performed by: PHYSICIAN ASSISTANT

## 2020-07-27 PROCEDURE — 87624 HPV HI-RISK TYP POOLED RSLT: CPT | Performed by: PHYSICIAN ASSISTANT

## 2020-07-27 PROCEDURE — G0145 SCR C/V CYTO,THINLAYER,RESCR: HCPCS | Performed by: PHYSICIAN ASSISTANT

## 2020-07-27 PROCEDURE — 99396 PREV VISIT EST AGE 40-64: CPT | Performed by: PHYSICIAN ASSISTANT

## 2020-07-27 PROCEDURE — G0476 HPV COMBO ASSAY CA SCREEN: HCPCS | Performed by: PHYSICIAN ASSISTANT

## 2020-07-27 PROCEDURE — 99213 OFFICE O/P EST LOW 20 MIN: CPT | Mod: 25 | Performed by: PHYSICIAN ASSISTANT

## 2020-07-27 RX ORDER — VENLAFAXINE 37.5 MG/1
37.5 TABLET ORAL DAILY
Qty: 30 TABLET | Refills: 1 | Status: SHIPPED | OUTPATIENT
Start: 2020-07-27 | End: 2021-10-15

## 2020-07-27 ASSESSMENT — MIFFLIN-ST. JEOR: SCORE: 1273.6

## 2020-07-27 NOTE — LETTER
August 4, 2020      Amena Rosario  [address]    [place] [address]    Dear ,      This letter is in regards to the PAP smear and HPV (Human Papillomavirus) test you had done recently. Your PAP test result is normal, but your HPV (Human Papillomavirus) test was positive.     About 80 percent of women have been exposed to HPV virus throughout their lifetime. There is no medication for the treatment of HPV. Typically your own immune system gets rid of the virus before it does harm. HPV is spread by direct skin-to-skin contact, including sexual intercourse, oral sex, anal sex, or any other contact involving the genital area (example: hand to genital contact). It is not possible to become infected with HPV by touching an object, such as a toilet seat. Most people who are infected with HPV have no signs or symptoms.    Things that you can do to boost your immune system and help your body get rid of HPV: get plenty of rest, eat a well-balanced diet of healthy foods, stop smoking, exercise regularly and decrease stress.    Please return in 1 year to repeat your pap smear and HPV test.     If you have additional questions regarding this result, please call our registered nurse, Claudia at [phone].    Sincerely,      April Bergeron PA-C//helene

## 2020-07-27 NOTE — PROGRESS NOTES
SUBJECTIVE:   CC: Amena Rosario is an 49 year old woman who presents for preventive health visit.     Healthy Habits:    Do you get at least three servings of calcium containing foods daily (dairy, green leafy vegetables, etc.)? yes    Amount of exercise or daily activities, outside of work: none    Problems taking medications regularly No    Medication side effects: No    Have you had an eye exam in the past two years? no    Do you see a dentist twice per year? yes    Do you have sleep apnea, excessive snoring or daytime drowsiness?no      Hot flashes and repeat TSH - was high last time was checked   Toenail fungus      Today's PHQ-2 Score:   PHQ-2 ( 1999 Pfizer) 7/27/2020 12/3/2019   Q1: Little interest or pleasure in doing things 0 0   Q2: Feeling down, depressed or hopeless 0 0   PHQ-2 Score 0 0       Abuse: Current or Past(Physical, Sexual or Emotional)- No  Do you feel safe in your environment? Yes        Social History     Tobacco Use     Smoking status: Current Some Day Smoker     Smokeless tobacco: Never Used     Tobacco comment: E-CIG   Substance Use Topics     Alcohol use: Not Currently     If you drink alcohol do you typically have >3 drinks per day or >7 drinks per week? No                     Reviewed orders with patient.  Reviewed health maintenance and updated orders accordingly - Yes  [facility]    Mammogram Screening: Patient over age 50, mutual decision to screen reflected in health maintenance.    Pertinent mammograms are reviewed under the imaging tab.  History of abnormal Pap smear: YES - updated in Problem List and Health Maintenance accordingly     Reviewed and updated as needed this visit by clinical staff  Tobacco  Allergies  Meds         Reviewed and updated as needed this visit by Provider        No past medical history on file.     ROS:  Other than what is noted in the HPI and PMH a complete review of systems is otherwise negative including: Constitutional, HEENT,  "endocrine, cardiovascular, respiratory, GI/, musculoskeletal, neuro, and psychiatric.     OBJECTIVE:   /70   Pulse 82   Temp 97.8  F (36.6  C) (Tympanic)   Resp 14   Ht 1.549 m (5' 1\")   Wt 72.1 kg (159 lb)   LMP  (LMP Unknown)   SpO2 99%   Breastfeeding No   BMI 30.04 kg/m    EXAM:  GENERAL: healthy, alert and no distress  EYES: Eyes grossly normal to inspection, PERRL and conjunctivae and sclerae normal  HENT: ear canals and TM's normal, nose and mouth without ulcers or lesions  NECK: no adenopathy, no asymmetry, masses, or scars and thyroid normal to palpation  RESP: lungs clear to auscultation - no rales, rhonchi or wheezes  BREAST: normal without masses, tenderness or nipple discharge and no palpable axillary masses or adenopathy  CV: regular rates and rhythm, normal S1 S2, no S3 or S4 and no murmur, click or rub  ABDOMEN: soft, nontender, no hepatosplenomegaly, no masses and bowel sounds normal   (female): normal female external genitalia, normal urethral meatus, vaginal mucosa pink, moist, well rugated, and normal cervix  MS: no gross musculoskeletal defects noted, no edema  SKIN: no suspicious lesions or rashes  NEURO: Normal strength and tone, mentation intact and speech normal  PSYCH: mentation appears normal, affect normal/bright    ASSESSMENT/PLAN:       ICD-10-CM    1. Routine general medical examination at a health care facility  Z00.00 Lipid panel reflex to direct LDL Fasting     Glucose     HIV Antigen Antibody Combo   2. Screening for malignant neoplasm of cervix  Z12.4 Pap imaged thin layer screen with HPV - recommended age 30 - 65 years (select HPV order below)     HPV High Risk Types DNA Cervical   3. Encounter for screening mammogram for breast cancer  Z12.31 *MA Screening Digital Bilateral   4. Colon cancer screening  Z12.11 Fecal colorectal cancer screen (FIT)   5. Other specified hypothyroidism  E03.8 TSH with free T4 reflex   6. Menopausal syndrome (hot flashes)  N95.1 " "venlafaxine (EFFEXOR) 37.5 MG tablet   7. Onychomycosis  B35.1 ALT     AST       1-4) Screenings discussed    5,7) Will repeat labs at her upcoming follow up appointment. Will obtain baseline LFT's in case we decide to start terbinafine.     6) Will start Effexor 37.5mg every day.       COUNSELING:   Reviewed preventive health counseling, as reflected in patient instructions    Estimated body mass index is 30.04 kg/m  as calculated from the following:    Height as of this encounter: 1.549 m (5' 1\").    Weight as of this encounter: 72.1 kg (159 lb).     reports that she has been smoking. She has never used smokeless tobacco.    Counseling Resources:  ATP IV Guidelines  Pooled Cohorts Equation Calculator  Breast Cancer Risk Calculator  FRAX Risk Assessment  ICSI Preventive Guidelines  Dietary Guidelines for Americans, 2010  USDA's MyPlate  ASA Prophylaxis  Lung CA Screening    April Bergeron PA-C  [facility]  "

## 2020-07-27 NOTE — PATIENT INSTRUCTIONS
Black Cohosh - supplement used for hot flashes    HIV screening, Liver enzymes, thyroid level, cholesterol - come fasting      Preventive Health Recommendations  Female Ages 50 - 64    Yearly exam: See your health care provider every year in order to  o Review health changes.   o Discuss preventive care.    o Review your medicines if your doctor has prescribed any.      Get a Pap test every three years (unless you have an abnormal result and your provider advises testing more often).    If you get Pap tests with HPV test, you only need to test every 5 years, unless you have an abnormal result.     You do not need a Pap test if your uterus was removed (hysterectomy) and you have not had cancer.    You should be tested each year for STDs (sexually transmitted diseases) if you're at risk.     Have a mammogram every 1 to 2 years.    Have a colonoscopy at age 50, or have a yearly FIT test (stool test). These exams screen for colon cancer.      Have a cholesterol test every 5 years, or more often if advised.    Have a diabetes test (fasting glucose) every three years. If you are at risk for diabetes, you should have this test more often.     If you are at risk for osteoporosis (brittle bone disease), think about having a bone density scan (DEXA).    Shots: Get a flu shot each year. Get a tetanus shot every 10 years.    Nutrition:     Eat at least 5 servings of fruits and vegetables each day.    Eat whole-grain bread, whole-wheat pasta and brown rice instead of white grains and rice.    Get adequate Calcium and Vitamin D.     Lifestyle    Exercise at least 150 minutes a week (30 minutes a day, 5 days a week). This will help you control your weight and prevent disease.    Limit alcohol to one drink per day.    No smoking.     Wear sunscreen to prevent skin cancer.     See your dentist every six months for an exam and cleaning.    See your eye doctor every 1 to 2 years.

## 2020-07-29 LAB
COPATH REPORT: NORMAL
PAP: NORMAL

## 2020-07-30 LAB
FINAL DIAGNOSIS: ABNORMAL
HPV HR 12 DNA CVX QL NAA+PROBE: POSITIVE
HPV16 DNA SPEC QL NAA+PROBE: NEGATIVE
HPV18 DNA SPEC QL NAA+PROBE: NEGATIVE
SPECIMEN DESCRIPTION: ABNORMAL
SPECIMEN SOURCE CVX/VAG CYTO: ABNORMAL

## 2020-08-03 ENCOUNTER — PATIENT OUTREACH (OUTPATIENT)
Dept: FAMILY MEDICINE | Facility: CLINIC | Age: 50
End: 2020-08-03

## 2020-08-03 NOTE — TELEPHONE ENCOUNTER
to send letter to pt.     7/27/20 abnormal pap 10+ years ago per patient  2012 NIL Pap, Neg HPV (Care Everywhere)  7/27/2020 NIL Pap, + HR HPV (neg 16/18). Plan cotest in 1 year, due 7/27/2021.      Claudia Preciado, RN  Pap Tracking

## 2020-09-08 DIAGNOSIS — I10 BENIGN ESSENTIAL HYPERTENSION: ICD-10-CM

## 2020-09-10 RX ORDER — LISINOPRIL 10 MG/1
10 TABLET ORAL DAILY
Qty: 90 TABLET | Refills: 3 | Status: SHIPPED | OUTPATIENT
Start: 2020-09-10 | End: 2021-10-08

## 2020-11-09 DIAGNOSIS — J45.40 MODERATE PERSISTENT ASTHMA WITHOUT COMPLICATION: ICD-10-CM

## 2020-11-09 NOTE — TELEPHONE ENCOUNTER
Requested Prescriptions   Pending Prescriptions Disp Refills     fluticasone (FLONASE) 50 MCG/ACT nasal spray 16 g 1     Sig: Spray 1 spray into both nostrils by intranasal route daily   Last Written Prescription Date:  10-4-20  Last Fill Quantity: 16,  # refills: 1   Last office visit: 12/3/2019 with prescribing provider:  12-3-19   Future Office Visit:            There is no refill protocol information for this order

## 2020-11-10 RX ORDER — FLUTICASONE PROPIONATE 50 MCG
SPRAY, SUSPENSION (ML) NASAL
Qty: 16 G | Refills: 1 | Status: SHIPPED | OUTPATIENT
Start: 2020-11-10 | End: 2021-03-02

## 2020-11-25 DIAGNOSIS — K21.00 GASTROESOPHAGEAL REFLUX DISEASE WITH ESOPHAGITIS: ICD-10-CM

## 2020-11-25 RX ORDER — OMEPRAZOLE 40 MG/1
40 CAPSULE, DELAYED RELEASE ORAL DAILY
Qty: 90 CAPSULE | Refills: 1 | Status: SHIPPED | OUTPATIENT
Start: 2020-11-25 | End: 2021-09-22

## 2020-11-25 NOTE — TELEPHONE ENCOUNTER
Seen by LAVERNE Bergeron PA-C on 7/27/2020. Refilled for 6 months per protocol. Thank you. Kassi Joy R.N.

## 2020-11-27 DIAGNOSIS — J45.40 MODERATE PERSISTENT ASTHMA WITHOUT COMPLICATION: ICD-10-CM

## 2020-11-30 RX ORDER — ALBUTEROL SULFATE 90 UG/1
AEROSOL, METERED RESPIRATORY (INHALATION)
Qty: 8.5 G | Refills: 3 | Status: SHIPPED | OUTPATIENT
Start: 2020-11-30 | End: 2021-05-28

## 2020-11-30 NOTE — TELEPHONE ENCOUNTER
Routing refill request to provider for review/approval because:  ACT not up to date.  Hemalatha Jason BSN, RN

## 2021-01-04 ENCOUNTER — TELEPHONE (OUTPATIENT)
Dept: FAMILY MEDICINE | Facility: CLINIC | Age: 51
End: 2021-01-04

## 2021-01-04 NOTE — TELEPHONE ENCOUNTER
Medication is not on medication list.    Attempt # 1  Called [phone] (home)       Did patient answer the phone: No, left a message on voicemail to return call to the [facility] at [phone].    MIKE LeN,RN  [facility]

## 2021-01-05 NOTE — TELEPHONE ENCOUNTER
2nd attempt  Left message on voice mail for patient to call clinic regarding refill request.   [phone]  Shefali Marc RN  [facility]

## 2021-01-06 ENCOUNTER — COMMUNICATION - HEALTHEAST (OUTPATIENT)
Dept: FAMILY MEDICINE | Facility: CLINIC | Age: 51
End: 2021-01-06

## 2021-01-06 DIAGNOSIS — R11.0 NAUSEA: ICD-10-CM

## 2021-02-08 DIAGNOSIS — J45.40 MODERATE PERSISTENT ASTHMA WITHOUT COMPLICATION: ICD-10-CM

## 2021-02-08 NOTE — TELEPHONE ENCOUNTER
"Requested Prescriptions   Pending Prescriptions Disp Refills    fluticasone-salmeterol (ADVAIR) 250-50 MCG/DOSE inhaler 60 each      Sig: Inhale 1 puff into the lungs every 12 hours       Inhaled Steroids Protocol Failed - 2/8/2021  2:34 PM        Failed - Asthma control assessment score within normal limits in last 6 months     Please review ACT score.           Failed - Recent (6 mo) or future (30 days) visit within the authorizing provider's specialty     Patient had office visit in the last 6 months or has a visit in the next 30 days with authorizing provider or within the authorizing provider's specialty.  See \"Patient Info\" tab in inbasket, or \"Choose Columns\" in Meds & Orders section of the refill encounter.            Passed - Patient is age 12 or older        Passed - Medication is active on med list       Long-Acting Beta Agonist Inhalers Protocol  Failed - 2/8/2021  2:34 PM        Failed - Asthma control assessment score within normal limits in last 6 months     Please review ACT score.           Failed - Recent (6 mo) or future (30 days) visit within the authorizing provider's specialty     Patient had office visit in the last 6 months or has a visit in the next 30 days with authorizing provider or within the authorizing provider's specialty.  See \"Patient Info\" tab in inbasket, or \"Choose Columns\" in Meds & Orders section of the refill encounter.            Passed - Patient is age 12 or older        Passed - Order for Serevent, Striverdi, or Foradil and pt has steroid inhaler        Passed - Medication is active on med list             "

## 2021-03-08 ENCOUNTER — TELEPHONE (OUTPATIENT)
Dept: FAMILY MEDICINE | Facility: CLINIC | Age: 51
End: 2021-03-08

## 2021-03-08 DIAGNOSIS — J45.40 MODERATE PERSISTENT ASTHMA WITHOUT COMPLICATION: ICD-10-CM

## 2021-03-08 NOTE — TELEPHONE ENCOUNTER
Message: Patient indicates using 2 sprays both nostrils daily. Please send a new script to reflect this if correct.    fluticasone (FLONASE) 50 MCG/ACT nasal spray

## 2021-03-09 RX ORDER — FLUTICASONE PROPIONATE 50 MCG
1-2 SPRAY, SUSPENSION (ML) NASAL DAILY
Qty: 16 G | Refills: 1 | Status: SHIPPED | OUTPATIENT
Start: 2021-03-09 | End: 2021-07-30

## 2021-03-17 ENCOUNTER — COMMUNICATION - HEALTHEAST (OUTPATIENT)
Dept: FAMILY MEDICINE | Facility: CLINIC | Age: 51
End: 2021-03-17

## 2021-03-17 DIAGNOSIS — R11.0 NAUSEA: ICD-10-CM

## 2021-03-23 ENCOUNTER — COMMUNICATION - HEALTHEAST (OUTPATIENT)
Dept: FAMILY MEDICINE | Facility: CLINIC | Age: 51
End: 2021-03-23

## 2021-03-23 DIAGNOSIS — Z87.440 HISTORY OF UTI: ICD-10-CM

## 2021-03-24 ENCOUNTER — TELEPHONE (OUTPATIENT)
Dept: FAMILY MEDICINE | Facility: CLINIC | Age: 51
End: 2021-03-24

## 2021-03-24 RX ORDER — FLUCONAZOLE 150 MG/1
150 TABLET ORAL ONCE
Qty: 1 TABLET | Refills: 0 | OUTPATIENT
Start: 2021-03-24 | End: 2021-03-24

## 2021-04-05 ENCOUNTER — TELEPHONE (OUTPATIENT)
Dept: FAMILY MEDICINE | Facility: CLINIC | Age: 51
End: 2021-04-05

## 2021-04-05 DIAGNOSIS — B37.31 CANDIDIASIS OF VAGINA: Primary | ICD-10-CM

## 2021-04-05 NOTE — TELEPHONE ENCOUNTER
Patient calling in requesting a refill of Diflucan. She said she is certain she has a yeast infection. She reports that she has not had any sexual interactions and she is certain it is not and STI. She said the over the counter medications have not been working and feel it is getting worse.     She did not want to set up an appointment and request a message be sent to the provider to see if she would send in a prescription for her.     Routed to provider to review and advise.     Thank you,   Nadeen Quintana RN

## 2021-04-06 RX ORDER — FLUCONAZOLE 150 MG/1
150 TABLET ORAL ONCE
Qty: 1 TABLET | Refills: 0 | Status: SHIPPED | OUTPATIENT
Start: 2021-04-06 | End: 2021-04-06

## 2021-04-06 NOTE — TELEPHONE ENCOUNTER
Left message on voice mail for patient that requested medication sent to pharmacy and to follow up if symptoms do not resolve in 7 days and to call clinic if any questions or concerns.   [phone]  Shefali Marc RN  [facility]

## 2021-04-13 ENCOUNTER — COMMUNICATION - HEALTHEAST (OUTPATIENT)
Dept: FAMILY MEDICINE | Facility: CLINIC | Age: 51
End: 2021-04-13

## 2021-04-13 DIAGNOSIS — R11.0 NAUSEA: ICD-10-CM

## 2021-05-24 ENCOUNTER — RECORDS - HEALTHEAST (OUTPATIENT)
Dept: ADMINISTRATIVE | Facility: CLINIC | Age: 51
End: 2021-05-24

## 2021-05-25 ENCOUNTER — RECORDS - HEALTHEAST (OUTPATIENT)
Dept: ADMINISTRATIVE | Facility: CLINIC | Age: 51
End: 2021-05-25

## 2021-05-25 DIAGNOSIS — J45.40 MODERATE PERSISTENT ASTHMA WITHOUT COMPLICATION: ICD-10-CM

## 2021-05-25 NOTE — LETTER
May 27, 2021      Amena Rosario  [address]   [facility] [address]      Dear Amena,     We received a medication request for albuterol (PROAIR HFA) 108 (90 Base) MCG/ACT inhaler and fluticasone-salmeterol (ADVAIR) 250-50 MCG/DOSE inhaler, we can not fill this until we receive this back.     Your clinic record indicates that you are due for an asthma update. We have a survey tool called an ACT (or Asthma Control Test) we use to measure the level of control of your asthma. Please complete the enclosed questionnaire and mail it back to us in the self-addressed stamped envelope.     If you have questions about this letter please contact your provider.     Sincerely,       Your [facility]  [place] Team

## 2021-05-26 ENCOUNTER — RECORDS - HEALTHEAST (OUTPATIENT)
Dept: ADMINISTRATIVE | Facility: CLINIC | Age: 51
End: 2021-05-26

## 2021-05-26 RX ORDER — ALBUTEROL SULFATE 90 UG/1
AEROSOL, METERED RESPIRATORY (INHALATION)
Qty: 8.5 G | Refills: 3 | OUTPATIENT
Start: 2021-05-26

## 2021-05-26 NOTE — TELEPHONE ENCOUNTER
"Routing refill request to provider for review/approval because:  Requested Prescriptions   Pending Prescriptions Disp Refills    albuterol (PROAIR HFA) 108 (90 Base) MCG/ACT inhaler 8.5 g 3     Sig: INHALE 2 PUFFS INTO THE LUNGS EVERY 4 HOURS AS NEEDED FOR SHORTNESS OF BREATH/DYSPNEA OR WHEEZING       Asthma Maintenance Inhalers - Anticholinergics Failed - 5/25/2021  3:40 PM        Failed - Asthma control assessment score within normal limits in last 6 months     Please review ACT score.           Failed - Recent (6 mo) or future (30 days) visit within the authorizing provider's specialty     Patient had office visit in the last 6 months or has a visit in the next 30 days with authorizing provider or within the authorizing provider's specialty.  See \"Patient Info\" tab in inbasket, or \"Choose Columns\" in Meds & Orders section of the refill encounter.            Passed - Patient is age 12 years or older        Passed - Medication is active on med list       Short-Acting Beta Agonist Inhalers Protocol  Failed - 5/25/2021  3:40 PM        Failed - Asthma control assessment score within normal limits in last 6 months     Please review ACT score.           Failed - Recent (6 mo) or future (30 days) visit within the authorizing provider's specialty     Patient had office visit in the last 6 months or has a visit in the next 30 days with authorizing provider or within the authorizing provider's specialty.  See \"Patient Info\" tab in inbasket, or \"Choose Columns\" in Meds & Orders section of the refill encounter.            Passed - Patient is age 12 or older        Passed - Medication is active on med list          fluticasone-salmeterol (ADVAIR) 250-50 MCG/DOSE inhaler 60 each 1     Sig: Inhale 1 puff into the lungs every 12 hours       Inhaled Steroids Protocol Failed - 5/25/2021  3:40 PM        Failed - Asthma control assessment score within normal limits in last 6 months     Please review ACT score.           Failed - " "Recent (6 mo) or future (30 days) visit within the authorizing provider's specialty     Patient had office visit in the last 6 months or has a visit in the next 30 days with authorizing provider or within the authorizing provider's specialty.  See \"Patient Info\" tab in inbasket, or \"Choose Columns\" in Meds & Orders section of the refill encounter.            Passed - Patient is age 12 or older        Passed - Medication is active on med list       Long-Acting Beta Agonist Inhalers Protocol  Failed - 5/25/2021  3:40 PM        Failed - Asthma control assessment score within normal limits in last 6 months     Please review ACT score.           Failed - Recent (6 mo) or future (30 days) visit within the authorizing provider's specialty     Patient had office visit in the last 6 months or has a visit in the next 30 days with authorizing provider or within the authorizing provider's specialty.  See \"Patient Info\" tab in inbasket, or \"Choose Columns\" in Meds & Orders section of the refill encounter.            Passed - Patient is age 12 or older        Passed - Order for Serevent, Striverdi, or Foradil and pt has steroid inhaler        Passed - Medication is active on med list               Vee THORPE, RN          "

## 2021-05-26 NOTE — TELEPHONE ENCOUNTER
Needs appt or at least an act score if to goal can get refill, if not needs appt. Daniella Molina PA-C

## 2021-05-27 ENCOUNTER — RECORDS - HEALTHEAST (OUTPATIENT)
Dept: ADMINISTRATIVE | Facility: CLINIC | Age: 51
End: 2021-05-27

## 2021-05-27 NOTE — TELEPHONE ENCOUNTER
RN cannot approve Refill Request    RN can NOT refill this medication med is not covered by policy/route to provider     . Last office visit: 12/28/2018 Daniella Edmond FNP Last Physical: 10/17/2017 Last MTM visit: Visit date not found Last visit same specialty: 12/28/2018 Daniella Edmond FNP.  Next visit within 3 mo: Visit date not found  Next physical within 3 mo: Visit date not found      Kenzie Coronel, [facility] Triage/Med Refill 3/28/2019    Requested Prescriptions   Pending Prescriptions Disp Refills     baclofen (LIORESAL) 10 MG tablet 150 tablet 0    There is no refill protocol information for this order

## 2021-05-27 NOTE — PROGRESS NOTES
ASSESSMENT:  1. Nausea  Ambulatory referral for Upper GI Endoscopy   2. Meningioma (H)  oxyCODONE (ROXICODONE) 5 MG immediate release tablet   3. Brain compression (H)  oxyCODONE (ROXICODONE) 5 MG immediate release tablet   4. Protrusion of cervical intervertebral disc  traMADol (ULTRAM) 50 mg tablet   5. Gastroesophageal reflux disease without esophagitis  omeprazole (PRILOSEC) 40 MG capsule    Ambulatory referral for Upper GI Endoscopy   6. Essential hypertension  lisinopril (PRINIVIL,ZESTRIL) 10 MG tablet       PLAN:  Nausea: Increase omeprazole to 40 mg a day.  Given ongoing nature of symptoms patient would like to pursue endoscopy I would agree.  She also has a remote history of an ulcer so well help her set up for referral.  Chronic pain with some more acute pain exacerbated due to recent injury with snow shoveling, having to lift and carry things that are outside of her typical tolerance.  Given a few extra tablets of oxycodone prior to her appointment for follow-up with neurosurgery.  Otherwise continue on chronic medications as ordered after this period of time.  Hypertension: Ongoing high blood pressure, start lisinopril which she has been on in the past.  No problem-specific Assessment & Plan notes found for this encounter.      There are no Patient Instructions on file for this visit.    Orders Placed This Encounter   Procedures     Ambulatory referral for Upper GI Endoscopy     Referral Priority:   Routine     Referral Type:   Diagnostic Imaging     Referral Reason:   Evaluation and Treatment     Requested Specialty:   Gastroenterology     Number of Visits Requested:   1     Medications Discontinued During This Encounter   Medication Reason     oxyCODONE (ROXICODONE) 5 MG immediate release tablet Reorder     traMADol (ULTRAM) 50 mg tablet Reorder     omeprazole (PRILOSEC) 20 MG capsule Reorder       No Follow-up on file.    CHIEF COMPLAINT:  Chief Complaint   Patient presents with     Nausea     nasuea  and indigestion x 1+ month        HISTORY OF PRESENT ILLNESS:  Amena is a 48 y.o. female here with multiple complaints.    Migraine, worse neck pain after last 2 snowstorm. These also exacerbate her head pain from previous surgery. She is having an MRI upcoming on  to evaluate this and appointment with Neurosurgeon on . Has had several headaches that have lasted 24 hours. Putting more pressure on old incision exacerbates headaches and she gets more muscle spasms. In the temples related to headaches. Feels like there is a bat against her head and neck when these occur. Cat passed away on the , she hurt herself further when carrying the cat carrier to the vet and to her car. Has increased need for pain medication in the short term until she has a plan with neurosurgery.     GI:she is nauseous all the time. If better during the day it happens at night but is mostly during the day. Tums helps a bit. She recently increased her Prilosec and that helped a little. Overall going on since approximately 1 month. She already takes 20 mg Prilosec daily. She makes sure she watches acidic foods in the diet and no changes to diet. She has follow up with nephrology in May. Previous history of duodenal ulcer she believes about 20 year ago.     Blood pressure continues to be high, start medication today and follow up for nurse check.         REVIEW OF SYSTEMS:      Constitutional: positive for fatigue and headache, negative for fevers, night sweats and weight loss  Neurological: negative  All other systems are negative  PFSH:  Reviewed, no changes      TOBACCO USE:  Social History     Tobacco Use   Smoking Status Former Smoker     Last attempt to quit: 2014     Years since quittin.2   Smokeless Tobacco Never Used       VITALS:  Vitals:    19 1331 19 1420   BP: 163/85 161/82   Patient Site: Left Arm Left Arm   Patient Position: Sitting Sitting   Cuff Size: Adult Regular Adult Regular   Pulse: 83 68    Resp: 16    Temp: 98.1  F (36.7  C)    TempSrc: Oral    Weight: 139 lb 9.6 oz (63.3 kg)      Wt Readings from Last 3 Encounters:   04/04/19 139 lb 9.6 oz (63.3 kg)   12/28/18 141 lb 2 oz (64 kg)   10/24/18 140 lb 6 oz (63.7 kg)       PHYSICAL EXAM:   /82 (Patient Site: Left Arm, Patient Position: Sitting, Cuff Size: Adult Regular)   Pulse 68   Temp 98.1  F (36.7  C) (Oral)   Resp 16   Wt 139 lb 9.6 oz (63.3 kg)   BMI 26.38 kg/m    General appearance: alert, appears stated age and cooperative  Head: Normocephalic, without obvious abnormality, atraumatic, posterior left scalp tender near old incision  Eyes: conjunctivae/corneas clear. PERRL, EOM's intact. Fundi benign.  Ears: normal TM's and external ear canals both ears  Nose: Nares normal. Septum midline. Mucosa normal. No drainage or sinus tenderness.  Throat: lips, mucosa, and tongue normal; teeth and gums normal  Neck: no adenopathy, no carotid bruit, no JVD, supple, symmetrical, trachea midline, thyroid not enlarged, symmetric, no tenderness/mass/nodules and cervical spine tender, paraspinal tight in c-spine  Lungs: clear to auscultation bilaterally  Heart: regular rate and rhythm, S1, S2 normal, no murmur, click, rub or gallop  Neurologic: Grossly normal    DATA REVIEWED:  Additional History from Old Records Summarized (2): 0  Decision to Obtain Records (1): 0  Radiology Tests Summarized or Ordered (1): 0  Labs Reviewed or Ordered (1): 0  Medicine Test Summarized or Ordered (1): 0  Independent Review of EKG or X-RAY(2 each): 0    The visit lasted a total of 40 minutes face to face with the patient. Over 50% of the time was spent counseling and educating the patient about plan of care.    MEDICATIONS:  Current Outpatient Medications   Medication Sig Dispense Refill     albuterol (PROAIR HFA) 90 mcg/actuation inhaler Inhale 2 puffs by mouth every 4 to 6 hours as needed 18 g 1     albuterol (PROVENTIL) 2.5 mg /3 mL (0.083 %) nebulizer solution Take 3  mL (2.5 mg total) by nebulization every 6 (six) hours as needed. 20 vial 1     baclofen (LIORESAL) 10 MG tablet TAKE 2 TABLETS BY MOUTH IN THE MORNING, TAKE 1 TABLET AT MIDDAY, AND TAKE 2 TABLETS IN THE EVENING. 150 tablet 0     EPINEPHrine (EPIPEN 2-KYLE) 0.3 mg/0.3 mL (1:1,000) atIn Inject 0.3 mg into the shoulder, thigh, or buttocks once. Or as directed per MD       fluticasone (FLONASE) 50 mcg/actuation nasal spray SPRAY 1-2 SPRAYS INTO EACH NOSTRIL BY INTRANASAL ROUTE ONCE DAILY. 16 g 10     fluticasone-salmeterol (ADVAIR DISKUS) 250-50 mcg/dose DISKUS Inhale 1 puff 2 (two) times a day. 180 each 3     lidocaine (LIDODERM) 5 % Remove & Discard patch within 12 hours or as directed by MD 15 patch 1     loratadine-pseudoephedrine (CLARITIN-D 24-HOUR)  mg per 24 hr tablet Take 1 tablet by mouth daily. 30 tablet 1     LORazepam (ATIVAN) 1 MG tablet   1     omeprazole (PRILOSEC) 40 MG capsule Take 1 capsule (40 mg total) by mouth daily before breakfast. 30 capsule 1     OXcarbazepine (TRILEPTAL) 600 MG tablet Take 2 tablets (1,200 mg total) by mouth 2 (two) times a day. 360 tablet 1     oxyCODONE (ROXICODONE) 5 MG immediate release tablet Take 1 tablet by oral route for pain 8-10/10 every 4 hours as needed. For acute pain can take in addition or alternate with Tramadol. 15 tablet 0     PROAIR HFA 90 mcg/actuation inhaler INHALE 2 PUFFS BY INHALATION ROUTE EVERY 4 TO 6 HOURS AS NEEDED 8.5 g 4     QUEtiapine (SEROQUEL) 200 MG tablet Take 1 tablet (200 mg total) by mouth at bedtime as needed. (Patient taking differently: Take 100-200 mg by mouth at bedtime as needed. ) 90 tablet 0     traMADol (ULTRAM) 50 mg tablet Take 1-2 tablets ( mg total) by mouth every 6 (six) hours as needed for pain. 180 tablet 0     triamcinolone (KENALOG) 0.1 % cream Apply 1 application topically see administration instructions. Apply a thin layer to eczema patches twice daily for a max of 2 weeks.       lisinopril (PRINIVIL,ZESTRIL)  10 MG tablet Take 1 tablet (10 mg total) by mouth daily. 90 tablet 1     No current facility-administered medications for this visit.        This note has been dictated using voice recognition software. Any grammatical or context distortions are unintentional and inherent to the software

## 2021-05-27 NOTE — TELEPHONE ENCOUNTER
Medication Question or Clarification  Who is calling: Pharmacy: [id number] [place]  What medication are you calling about? (include dose and sig) Tramadol 50 mg, Oxycodone 5 MG, Lorazepam   Who prescribed the medication?: Daniella Edmond  What is your question/concern?: Pharmacy needs a note from PCP stating they understand the interactions of these prescriptions (slowed breathing), so that the insurance company will cover the Tramadol.  Pharmacy: [id number] [place]  Okay to leave a detailed message?: Yes  Site CMT - Please call the pharmacy to obtain any additional needed information.

## 2021-05-28 NOTE — PROGRESS NOTES
"Amena Rosario presents today for a follow up on a recent MRI of the Brain. She does mention having significant pressure in various places of the head that lasts more than 24 hours. She states it is so painful that she needs to hold her head. She describes these as \"intense pain\" and \"the worst migraine you could ever have.\" She also c/o \"brain spasms\" in t he temple. She also states that she has been having some memory problems with remembering dates and years.   Bony Goss, BRITTANY    "

## 2021-05-28 NOTE — TELEPHONE ENCOUNTER
Refill Approved: Albuterol nebulizer solution    Rx renewed per Medication Renewal Policy. Medication was last renewed on 3/5/2018 with 1 refill.  Last office visit: 4/4/2019 with PCP ISABELL Walls, [facility] Triage/Med Refill 5/7/2019     Requested Prescriptions   Pending Prescriptions Disp Refills     ondansetron (ZOFRAN-ODT) 4 MG disintegrating tablet [Pharmacy Med Name: Ondansetron Oral Tablet Disintegrating 4 MG] 10 tablet 0     Sig: Take 1 tablet (4 mg) by mouth every 8 hours as needed for nausea.       There is no refill protocol information for this order        albuterol (PROVENTIL) 2.5 mg /3 mL (0.083 %) nebulizer solution [Pharmacy Med Name: Albuterol Sulfate Inhalation Nebulization Solution (2.5 MG/3ML) 0.083%] 75 mL 0     Sig: Take 3 mL (2.5 mg total) by nebulization every 6 (six) hours as needed.       Albuterol/Levalbuterol Refill Protocol Passed - 5/7/2019  8:27 PM        Passed - PCP or prescribing provider visit in last year     Last office visit with prescriber/PCP: 4/4/2019 Daniella Edmond FNP OR same dept: 4/4/2019 Daniella Edmond FNP OR same specialty: 4/4/2019 Daniella Edmond FNP Last physical: 10/17/2017       Next appt within 3 mo: Visit date not found  Next physical within 3 mo: Visit date not found  Prescriber OR PCP: DEREK Malik  Last diagnosis associated with med order: 1. Nausea  - ondansetron (ZOFRAN-ODT) 4 MG disintegrating tablet [Pharmacy Med Name: Ondansetron Oral Tablet Disintegrating 4 MG]; Take 1 tablet (4 mg) by mouth every 8 hours as needed for nausea.  Dispense: 10 tablet; Refill: 0    2. Asthma  - albuterol (PROVENTIL) 2.5 mg /3 mL (0.083 %) nebulizer solution [Pharmacy Med Name: Albuterol Sulfate Inhalation Nebulization Solution (2.5 MG/3ML) 0.083%]; Take 3 mL (2.5 mg total) by nebulization every 6 (six) hours as needed.  Dispense: 75 mL; Refill: 0    If protocol passes may refill for 6 months if within 3 months of last provider visit (or a total of 9 months). If  patient requesting >1 inhaler per month refill x 6 months and have patient make appointment with provider.

## 2021-05-28 NOTE — TELEPHONE ENCOUNTER
I don't have availability unfortunately. She could do virtevelioell visit or walk in clinic. With [employer] she can do on computer, just sometimes a higher copay of 40 or so dollars.

## 2021-05-28 NOTE — TELEPHONE ENCOUNTER
Pt states that she has been vomiting since yesterday. She states she is not able to keep down any food. Today is a little better and she is able to keep down some food, but patient states she just feels nauseated and is wondering if Zofran would help. Says it could be a stomach bug but she is not sure. She is afraid to take her medication because because she does not want to throw them up. Please advise

## 2021-05-28 NOTE — PROGRESS NOTES
The patient is a 48-year-old female.  She is 1-1/2 years postop posterior fossa craniectomy for meningioma.  Her current MRI scan looks excellent. There is no residual or recurrent tumor.  There is no hydrocephalus.  There is no significant pseudomeningocele.  I went over the MRI pictures with her.  Her pathology was a grade 1 meningioma.  She has multiple complaints including spasms in her temples and headaches.  She also complains of neck pain and pain down her arms.  She may be a candidate for biofeedback for her head.  Maybe get a neurology consult and EMG for the neck and arms.  I went over the cervical MRI pictures with her.  Maybe not do the big neck fusion the patient says Dr. Bergman plans.  Plan follow-up head MRI in a year, sooner as needed.  Patient is satisfied with the plan.  Total time 25 minutes, more than 50% spent counseling and/or coordinating care.

## 2021-05-28 NOTE — TELEPHONE ENCOUNTER
RN cannot approve Refill Request: Ondansetron    RN can NOT refill this medication med is not covered by policy/route to provider. Last office visit: 4/4/2019 Daniella Edmond FNP Last Physical: 10/17/2017 Last MTM visit: Visit date not found Last visit same specialty: 4/4/2019 Daniella Edmond FNP.  Next visit within 3 mo: Visit date not found  Next physical within 3 mo: Visit date not found      Kristi Walls, [facility] Triage/Med Refill 5/7/2019    Requested Prescriptions   Pending Prescriptions Disp Refills     ondansetron (ZOFRAN-ODT) 4 MG disintegrating tablet [Pharmacy Med Name: Ondansetron Oral Tablet Disintegrating 4 MG] 10 tablet 0     Sig: Take 1 tablet (4 mg) by mouth every 8 hours as needed for nausea.       There is no refill protocol information for this order        albuterol (PROVENTIL) 2.5 mg /3 mL (0.083 %) nebulizer solution [Pharmacy Med Name: Albuterol Sulfate Inhalation Nebulization Solution (2.5 MG/3ML) 0.083%] 75 mL 0     Sig: Take 3 mL (2.5 mg total) by nebulization every 6 (six) hours as needed.       Albuterol/Levalbuterol Refill Protocol Passed - 5/7/2019  8:27 PM        Passed - PCP or prescribing provider visit in last year     Last office visit with prescriber/PCP: 4/4/2019 Daniella Edmond FNP OR same dept: 4/4/2019 Daniella Edmond FNP OR same specialty: 4/4/2019 Daniella Edmond FNP Last physical: 10/17/2017       Next appt within 3 mo: Visit date not found  Next physical within 3 mo: Visit date not found  Prescriber OR PCP: DEREK Malik  Last diagnosis associated with med order: 1. Nausea  - ondansetron (ZOFRAN-ODT) 4 MG disintegrating tablet [Pharmacy Med Name: Ondansetron Oral Tablet Disintegrating 4 MG]; Take 1 tablet (4 mg) by mouth every 8 hours as needed for nausea.  Dispense: 10 tablet; Refill: 0    2. Asthma  - albuterol (PROVENTIL) 2.5 mg /3 mL (0.083 %) nebulizer solution [Pharmacy Med Name: Albuterol Sulfate Inhalation Nebulization Solution (2.5 MG/3ML) 0.083%]; Take 3 mL (2.5 mg total) by  nebulization every 6 (six) hours as needed.  Dispense: 75 mL; Refill: 0    If protocol passes may refill for 6 months if within 3 months of last provider visit (or a total of 9 months). If patient requesting >1 inhaler per month refill x 6 months and have patient make appointment with provider.

## 2021-05-28 NOTE — TELEPHONE ENCOUNTER
Pt says she has a UTI and she says she can come in to be seen but wants to come in asap.  No appt today available at the clinic  Symptoms started yesterday and worse last night and cannot take it anymore per pt  Pt wants to be treated over the phone  Burning with urinating and at other times has burning too and urinating more often  Pt will go to -hospital up her road if needed-will check with urgent care to see if one available in her area  No blood in urine  No fevers  not sexually active    1.) pt is asking to be treated for UTI without appt    Pharmacy is [facility], RN [facility] RN T[facility]    Reason for Disposition    All other females with painful urination, or patient wants to be seen    Age > 50 years    Painful urination AND EITHER frequency or urgency    Protocols used: URINATION PAIN - FEMALE-A-[place]

## 2021-05-28 NOTE — TELEPHONE ENCOUNTER
Controlled Substance Refill Request  Medication:   Requested Prescriptions     Pending Prescriptions Disp Refills     traMADol (ULTRAM) 50 mg tablet 180 tablet 0     Sig: Take 1-2 tablets ( mg total) by mouth every 6 (six) hours as needed for pain.     Date Last Fill: 4/4/18  [facility]   Submit electronically to pharmacy  Controlled Substance Agreement on File:   Encounter-Level CSA Scan Date - 09/19/2017:    Scan on 9/25/2017  9:14 AM (below)             Encounter-Level CSA Scan Date - 08/24/2017:    Scan on 8/28/2017 12:33 PM (below)             Encounter-Level CSA Scan Date - 01/13/2017:    Scan on 2/2/2017  3:15 PM (below)         Last office visit: 4/4/2019 Daniella Edmond FNP

## 2021-05-28 NOTE — TELEPHONE ENCOUNTER
Who is calling:  Patient   Reason for Call:  Wanting nausea pills still throwing up and send to [facility]   Date of last appointment with primary care: 04/04/19  Okay to leave a detailed message: Yes

## 2021-05-28 NOTE — TELEPHONE ENCOUNTER
Who is calling:  Patient  Reason for Call:  Patient asking for feedback from earlier.  Writer read the following to patient per Daniella Edmond CNP : Idon't have availability unfortunately. She could do [facility] visit or walk in clinic. With [facility] she can do on computer, just sometimes a higher copay of 40 or so dollars.  Patient is going to go to an urgent care [facility].     Date of last appointment with primary care: NA  Okay to leave a detailed message: No call back needed.

## 2021-05-29 NOTE — TELEPHONE ENCOUNTER
"Patient thinks she is having reaction to biotin vitamin.  Makes her face burn and dizzy.  Today loss of coordination.  Falling to the floor when attempting to walk.    Was told to call pap by pharmacy.    Legs are wobbly.  A week ago symptoms began.  Also takes sodium.  Currently dieting.  Lasix is at 40mg currently x 2 weeks at this level from her nepherologist.  Currently on 6 cups day limit of liquids.    She thinks her trileptal is causing her to feel sick.  Prescribed by pcp.    Cannot drive.  Cannot get to clinic.  Falling down.    Advised to go to [facility].  Patient declines.    Patient wants her pcp to tell her to go off Trileptal, and if she should go by ambulance to hospital.      Sandra Henry, RN, [facility] Nurse Triage/Med Refills RN     Reason for Disposition    [1] Request for URGENT new prescription or refill of \"essential\" medication (i.e., likelihood of harm to patient if not taken) AND [2] triager unable to fill per unit policy    Protocols used: MEDICATION QUESTION CALL-A-AH      "

## 2021-05-29 NOTE — TELEPHONE ENCOUNTER
RN Assessment/Reason for Call:   Okay to leave Detailed Message  Patient calling in, wonders when her last tetanus ; 10/18/2002 & 4/9/12  RN Action/Disposition:  Agrees to plan.     Alice Jovel, BRINA    [facility] Triage/med refill  6/8/2019  3:53 PM

## 2021-05-29 NOTE — TELEPHONE ENCOUNTER
Refill Approved    Rx renewed per Medication Renewal Policy. Medication was last renewed on 4/4/19.    Kenzie Coronel, [facility] Triage/Med Refill 6/5/2019     Requested Prescriptions   Pending Prescriptions Disp Refills     omeprazole (PRILOSEC) 40 MG capsule 30 capsule 1     Sig: Take 1 capsule (40 mg total) by mouth daily before breakfast.       GI Medications Refill Protocol Passed - 6/5/2019 11:21 AM        Passed - PCP or prescribing provider visit in last 12 or next 3 months.     Last office visit with prescriber/PCP: 5/21/2019 Daniella Edmond FNP OR same dept: 5/21/2019 Daniella Edmond FNP OR same specialty: 5/21/2019 Daniella Edmond FNP  Last physical: 10/17/2017 Last MTM visit: Visit date not found   Next visit within 3 mo: Visit date not found  Next physical within 3 mo: Visit date not found  Prescriber OR PCP: DEREK Malik  Last diagnosis associated with med order: 1. Gastroesophageal reflux disease without esophagitis  - omeprazole (PRILOSEC) 40 MG capsule; Take 1 capsule (40 mg total) by mouth daily before breakfast.  Dispense: 30 capsule; Refill: 1    If protocol passes may refill for 12 months if within 3 months of last provider visit (or a total of 15 months).

## 2021-05-29 NOTE — TELEPHONE ENCOUNTER
Relayed providers message to patient.    She states she is also going to check with her psyc provider also.    Sandra Henry, RN, [facility] Nurse Triage/Med Refills RN

## 2021-05-29 NOTE — TELEPHONE ENCOUNTER
"RN Triage:    Pertinent history:  Has low sodium so is on fluid restriction.  Takes \"two salt tablets\" daily.  Seeing nephrologist for above.    Was seen at [facility] on 5/17/19 and was diagnosed with a UTI.  Was prescribed Keflex.  Has one dose left, but still has burning with urination.  Afebrile.  Was seen in follow up in clinic on 5/21/19.  See office visit note.    Request:  Would calvin Edmond prescribed Cipro, or another antibiotic?  [facility] [place].  She is awaiting a call back on 5/29/19.    Marianela Cooper, RN   [facility]  "

## 2021-05-29 NOTE — TELEPHONE ENCOUNTER
"Who is calling:  Patient   Reason for Call:  Patient calling back to add additional information . Pt state's \"Exess amount od ADh hormone going in my urine ,reducing the sodium levels\".   Date of last appointment with primary care:05/21/2019  Okay to leave a detailed message: Yes.      "

## 2021-05-29 NOTE — TELEPHONE ENCOUNTER
She should go to ED. I would not advise any medication changes until she is seen. With sodium being so low recently she needs further evaluation.

## 2021-05-29 NOTE — TELEPHONE ENCOUNTER
Patient Returning Call  Reason for call:  Returning phone call.  Information relayed to patient:  Below message relayed to patient.   Patient states she is not going to schedule an appointment at this time. Patient wanted to inform provider she has increased her Lasix to 60 mg daily for the next two days. Patient states she may need a saline infusion. Patient states she believes she is not able to fight the UTI, due to her water restrictions.  Patient has additional questions:  No  If YES, what are your questions/concerns:  No additional questions at this time.  Okay to leave a detailed message?: No

## 2021-05-29 NOTE — PROGRESS NOTES
ASSESSMENT:  1. Meningioma (H)  oxyCODONE (ROXICODONE) 5 MG immediate release tablet   2. Brain compression (H)  oxyCODONE (ROXICODONE) 5 MG immediate release tablet   3. Nausea  US Abdomen Limited    ondansetron (ZOFRAN-ODT) 4 MG disintegrating tablet   4. History of UTI  fluconazole (DIFLUCAN) 150 MG tablet    phenazopyridine (PYRIDIUM) 200 MG tablet    DISCONTINUED: phenazopyridine (PYRIDIUM) 100 MG tablet   5. Bee allergy status  EPINEPHrine (EPIPEN 2-KYLE) 0.3 mg/0.3 mL injection       PLAN:  Provided refills of pain medication to use as needed.  Patient has weaned down some and I encouraged her to continue practicing this if possible.  Patient tends to do better in the summertime when there is less noticeable etc.  Recent urinary tract infection is improving and patient currently still on antibiotics.  Provided fluconazole today as she feels like she is getting yeast infection.  Pyridium as needed for urinary tract pain.  Follow-up for urinary tract infection if symptoms continue after completing course of antibiotics.  EpiPen given.  Ongoing nausea, patient concerned about her gallbladder issue.  Recently had endoscopy that was negative.  Will work-up ultrasound and follow for results.  No problem-specific Assessment & Plan notes found for this encounter.      There are no Patient Instructions on file for this visit.    Orders Placed This Encounter   Procedures     US Abdomen Limited     Standing Status:   Future     Standing Expiration Date:   5/21/2020     Order Specific Question:   Is the patient pregnant?     Answer:   No     Order Specific Question:   Can the procedure be changed per Radiologist protocol?     Answer:   Yes     Medications Discontinued During This Encounter   Medication Reason     lidocaine (LIDODERM) 5 %      PROAIR HFA 90 mcg/actuation inhaler      oxyCODONE (ROXICODONE) 5 MG immediate release tablet Reorder     ondansetron (ZOFRAN-ODT) 4 MG disintegrating tablet Reorder      phenazopyridine (PYRIDIUM) 100 MG tablet      EPINEPHrine (EPIPEN 2-KYLE) 0.3 mg/0.3 mL (1:1,000) atIn Reorder       No follow-ups on file.    CHIEF COMPLAINT:  Chief Complaint   Patient presents with     Endoscopy     constantly nauseous        HISTORY OF PRESENT ILLNESS:  Amena is a 48 y.o. female Gideon evaluation of nausea.  Recently we had worked her up for similar complaints and tried medication to treat acid reflux she is still had continued symptoms we proceeded with endoscopy and endoscopy is negative.  She has noticed more epigastric pain sometimes radiating to the right side, she is noticed at least at times worse after eating.  She is wondering if it could be her gallbladder.  There is some family history of gallbladder disease in the family and she would like to see if this could be a possibility of was causing her symptoms.  Stools are normal, other than nausea no other upper GI symptoms.  No blood in the stools.  Patient also requesting some refills today on her chronic medications. Can use this also as a med check for her opioids.  Currently she is using tramadol, told substance agreement is up-to-date.  Continue medication as is.  Very sparingly we have been using oxycodone for breakthrough pain if she injures herself.  We use this a couple times this winter with injuries from shoveling.  She has been using a lot of less pain medication overall and feeling well.  She is trying to use as minimal as possible and is happy with her progress at this time.  Recent urinary tract infection still on antibiotics.  She is requesting Pyridium and Diflucan today as she is starting to get a yeast infection still has ongoing urinary tract pain.  Discussed follow-up as needed after course of antibiotics is complete.    REVIEW OF SYSTEMS:        All other systems are negative  PFSH:  Reviewed, no changes      TOBACCO USE:  Social History     Tobacco Use   Smoking Status Former Smoker     Last attempt to quit:  2014     Years since quittin.4   Smokeless Tobacco Never Used       VITALS:  Vitals:    19 1324   BP: 117/80   Patient Site: Left Arm   Patient Position: Sitting   Cuff Size: Adult Regular   Pulse: 76   Resp: 16   Temp: 98.1  F (36.7  C)   TempSrc: Oral   Weight: 140 lb 9.6 oz (63.8 kg)     Wt Readings from Last 3 Encounters:   19 140 lb 9.6 oz (63.8 kg)   19 139 lb (63 kg)   19 139 lb 9.6 oz (63.3 kg)       PHYSICAL EXAM:   /80 (Patient Site: Left Arm, Patient Position: Sitting, Cuff Size: Adult Regular)   Pulse 76   Temp 98.1  F (36.7  C) (Oral)   Resp 16   Wt 140 lb 9.6 oz (63.8 kg)   BMI 26.57 kg/m    General appearance: alert, appears stated age and cooperative  Head: Normocephalic, without obvious abnormality, atraumatic  Eyes: conjunctivae/corneas clear. PERRL, EOM's intact. Fundi benign.  Ears: normal TM's and external ear canals both ears  Nose: Nares normal. Septum midline. Mucosa normal. No drainage or sinus tenderness.  Throat: lips, mucosa, and tongue normal; teeth and gums normal  Neck: no adenopathy, no carotid bruit, no JVD, supple, symmetrical, trachea midline, thyroid not enlarged, symmetric, no tenderness/mass/nodules and Tenderness to palpation posterior and base of skull, tight musculature throughout.  Lungs: clear to auscultation bilaterally  Abdomen: soft, non-tender; bowel sounds normal; no masses,  no organomegaly  Neurologic: Grossly normal    DATA REVIEWED:  Additional History from Old Records Summarized (2): 0  Decision to Obtain Records (1): 0  Radiology Tests Summarized or Ordered (1): abd ultrasound  Labs Reviewed or Ordered (1): 0  Medicine Test Summarized or Ordered (1): 0  Independent Review of EKG or X-RAY(2 each): 0    The visit lasted a total of 40 minutes face to face with the patient. Over 50% of the time was spent counseling and educating the patient about plan of care.    MEDICATIONS:  Current Outpatient Medications   Medication  Sig Dispense Refill     albuterol (PROAIR HFA) 90 mcg/actuation inhaler Inhale 2 puffs by mouth every 4 to 6 hours as needed 18 g 1     albuterol (PROVENTIL) 2.5 mg /3 mL (0.083 %) nebulizer solution Take 3 mL (2.5 mg total) by nebulization every 6 (six) hours as needed. 75 mL 0     baclofen (LIORESAL) 10 MG tablet TAKE 2 TABLETS BY MOUTH IN THE MORNING, TAKE 1 TABLET AT MIDDAY, AND TAKE 2 TABLETS IN THE EVENING. 150 tablet 0     fluocinonide (LIDEX) 0.05 % external solution Apply 1 application topically 2 (two) times a day as needed.       fluticasone (FLONASE) 50 mcg/actuation nasal spray SPRAY 1-2 SPRAYS INTO EACH NOSTRIL BY INTRANASAL ROUTE ONCE DAILY. 16 g 10     fluticasone-salmeterol (ADVAIR DISKUS) 250-50 mcg/dose DISKUS Inhale 1 puff 2 (two) times a day. 180 each 3     lisinopril (PRINIVIL,ZESTRIL) 10 MG tablet Take 1 tablet (10 mg total) by mouth daily. 90 tablet 1     loratadine-pseudoephedrine (CLARITIN-D 24-HOUR)  mg per 24 hr tablet Take 1 tablet by mouth daily. 30 tablet 1     LORazepam (ATIVAN) 1 MG tablet Take 0.5 mg by mouth every 6 (six) hours as needed.         1     omeprazole (PRILOSEC) 40 MG capsule Take 1 capsule (40 mg total) by mouth daily before breakfast. 30 capsule 1     ondansetron (ZOFRAN-ODT) 4 MG disintegrating tablet Take 1 tablet (4 mg) by mouth every 8 hours as needed for nausea. 30 tablet 1     OXcarbazepine (TRILEPTAL) 600 MG tablet Take 2 tablets (1,200 mg total) by mouth 2 (two) times a day. 360 tablet 1     oxyCODONE (ROXICODONE) 5 MG immediate release tablet Take 1 tablet by oral route for pain 8-10/10 every 4 hours as needed. For acute pain can take in addition or alternate with Tramadol. 15 tablet 0     QUEtiapine (SEROQUEL) 200 MG tablet Take 1 tablet (200 mg total) by mouth at bedtime as needed. (Patient taking differently: Take 100-200 mg by mouth at bedtime as needed. ) 90 tablet 0     traMADol (ULTRAM) 50 mg tablet Take 1-2 tablets ( mg total) by mouth  every 6 (six) hours as needed for pain. 180 tablet 0     triamcinolone (KENALOG) 0.1 % cream Apply 1 application topically see administration instructions. Apply a thin layer to eczema patches twice daily for a max of 2 weeks.       phenazopyridine (PYRIDIUM) 200 MG tablet Take 1 tablet (200 mg total) by mouth 3 (three) times a day as needed for pain. 30 tablet 0     No current facility-administered medications for this visit.        This note has been dictated using voice recognition software. Any grammatical or context distortions are unintentional and inherent to the software

## 2021-05-30 VITALS — HEIGHT: 61 IN | BODY MASS INDEX: 25.3 KG/M2 | WEIGHT: 134 LBS

## 2021-05-30 VITALS — WEIGHT: 135 LBS | BODY MASS INDEX: 25.51 KG/M2

## 2021-05-30 NOTE — TELEPHONE ENCOUNTER
Refill Approved    Rx renewed per Medication Renewal Policy. Medication was last renewed on 7/30/18    Sharon Rodríguez, [facility] Triage/Med Refill 7/26/2019     Requested Prescriptions   Pending Prescriptions Disp Refills     fluticasone propionate (FLONASE) 50 mcg/actuation nasal spray [Pharmacy Med Name: Fluticasone Propionate Nasal Suspension 50 MCG/ACT] 16 g 2     Sig: SPRAY 1-2 SPRAYS INTO EACH NOSTRIL BY INTRANASAL ROUTE ONCE DAILY.       Nasal Steroid Refill Protocol Passed - 7/26/2019 11:55 AM        Passed - Patient has had office visit/physical in last 2 years     Last office visit with prescriber/PCP: 5/21/2019 OR same dept: 5/21/2019 Daniella Edmond FNP OR same specialty: 5/21/2019 Daniella Edmond FNP Last physical: 10/17/2017 Last MTM visit: Visit date not found    Next appt within 3 mo: Visit date not found  Next physical within 3 mo: Visit date not found  Prescriber OR PCP: DEREK Malik  Last diagnosis associated with med order: 1. Environmental allergies  - fluticasone propionate (FLONASE) 50 mcg/actuation nasal spray [Pharmacy Med Name: Fluticasone Propionate Nasal Suspension 50 MCG/ACT]; SPRAY 1-2 SPRAYS INTO EACH NOSTRIL BY INTRANASAL ROUTE ONCE DAILY.  Dispense: 16 g; Refill: 2     If protocol passes may refill for 12 months if within 3 months of last provider visit (or a total of 15 months).

## 2021-05-30 NOTE — TELEPHONE ENCOUNTER
Controlled Substance Refill Request  Medication:   Requested Prescriptions     Pending Prescriptions Disp Refills     traMADol (ULTRAM) 50 mg tablet [Pharmacy Med Name: traMADol HCl Oral Tablet 50 MG] 180 tablet 0     Sig: Take 1-2 tablets ( mg) by mouth every 6 hours as needed for pain.     Date Last Fill: 5/14/19  [facility]   Submit electronically to pharmacy  Controlled Substance Agreement on File:   Encounter-Level CSA Scan Date - 09/19/2017:    Scan on 9/25/2017  9:14 AM (below)             Encounter-Level CSA Scan Date - 08/24/2017:    Scan on 8/28/2017 12:33 PM (below)             Encounter-Level CSA Scan Date - 01/13/2017:    Scan on 2/2/2017  3:15 PM (below)         Last office visit: Last office visit pertaining to requested medication was 5/21/19.

## 2021-05-30 NOTE — TELEPHONE ENCOUNTER
Controlled Substance Refill Request  Medication Name:   Requested Prescriptions     Pending Prescriptions Disp Refills     oxyCODONE (ROXICODONE) 5 MG immediate release tablet 15 tablet 0     Sig: Take 1 tablet by oral route for pain 8-10/10 every 4 hours as needed. For acute pain can take in addition or alternate with Tramadol.     Date Last Fill: 5/21/2019  [facility]      Submit electronically to pharmacy  Controlled Substance Agreement Date Scanned:   Encounter-Level CSA Scan Date - 09/19/2017:    Scan on 9/25/2017  9:14 AM (below)             Encounter-Level CSA Scan Date - 08/24/2017:    Scan on 8/28/2017 12:33 PM (below)             Encounter-Level CSA Scan Date - 01/13/2017:    Scan on 2/2/2017  3:15 PM (below)         Last office visit with prescriber/PCP: 5/21/2019 Daniella Edmond FNP OR meir dept: 5/21/2019 Daniella Edmond FNP OR same specialty: 5/21/2019 Daniella Edmond FNP  Last physical: 10/17/2017 Last MTM visit: Visit date not found

## 2021-05-30 NOTE — TELEPHONE ENCOUNTER
RN cannot approve Refill Request    RN can NOT refill this medication med is not covered by policy/route to provider. Last office visit: 5/21/2019 Daniella Edmond FNP Last Physical: 10/17/2017 Last MTM visit: Visit date not found Last visit same specialty: 5/21/2019 Daniella Edmond FNP.  Next visit within 3 mo: Visit date not found  Next physical within 3 mo: Visit date not found      Kristi Walls, [facility] Triage/Med Refill 6/30/2019    Requested Prescriptions   Pending Prescriptions Disp Refills     baclofen (LIORESAL) 10 MG tablet [Pharmacy Med Name: Baclofen Oral Tablet 10 MG] 150 tablet 0     Sig: TAKE 2 TABLETS BY MOUTH IN THE MORNING, TAKE 1 TABLET AT MIDDAY, AND TAKE 2 TABLETS IN THE EVENING.       There is no refill protocol information for this order

## 2021-05-31 VITALS — BODY MASS INDEX: 26.24 KG/M2 | WEIGHT: 139 LBS | HEIGHT: 61 IN

## 2021-05-31 VITALS — BODY MASS INDEX: 24.1 KG/M2 | WEIGHT: 127.56 LBS

## 2021-05-31 VITALS — HEIGHT: 61 IN | WEIGHT: 135 LBS | BODY MASS INDEX: 25.49 KG/M2

## 2021-05-31 VITALS — WEIGHT: 135.1 LBS | HEIGHT: 61 IN | BODY MASS INDEX: 25.51 KG/M2

## 2021-05-31 VITALS — WEIGHT: 150.44 LBS | BODY MASS INDEX: 28.42 KG/M2

## 2021-05-31 VITALS — BODY MASS INDEX: 25.34 KG/M2 | HEIGHT: 61 IN | WEIGHT: 134.25 LBS

## 2021-05-31 VITALS — WEIGHT: 135 LBS | HEIGHT: 61 IN | BODY MASS INDEX: 25.49 KG/M2

## 2021-05-31 VITALS — WEIGHT: 134.1 LBS | HEIGHT: 61 IN | BODY MASS INDEX: 25.32 KG/M2

## 2021-05-31 VITALS — BODY MASS INDEX: 28.32 KG/M2 | HEIGHT: 61 IN | WEIGHT: 150 LBS

## 2021-05-31 VITALS — WEIGHT: 134.5 LBS | BODY MASS INDEX: 25.41 KG/M2

## 2021-05-31 VITALS — WEIGHT: 148 LBS | BODY MASS INDEX: 27.96 KG/M2

## 2021-05-31 VITALS — BODY MASS INDEX: 26.07 KG/M2 | WEIGHT: 138 LBS

## 2021-05-31 NOTE — TELEPHONE ENCOUNTER
RN cannot approve Refill Request    RN can NOT refill this medication med is not covered by policy/route to provider. Last office visit: 5/21/2019 Daniella Edmond FNP Last Physical: 10/17/2017 Last MTM visit: Visit date not found Last visit same specialty: 5/21/2019 Daniella Edmond FNP.  Next visit within 3 mo: Visit date not found  Next physical within 3 mo: Visit date not found      Sandra Henry, [facility] Triage/Med Refill 8/13/2019    Requested Prescriptions   Pending Prescriptions Disp Refills     baclofen (LIORESAL) 10 MG tablet [Pharmacy Med Name: Baclofen Oral Tablet 10 MG] 150 tablet 0     Sig: TAKE 2 TABLETS BY MOUTH IN THE MORNING, TAKE 1 TABLET AT MIDDAY, AND TAKE 2 TABLETS IN THE EVENING.       There is no refill protocol information for this order

## 2021-06-01 VITALS — WEIGHT: 136.38 LBS | BODY MASS INDEX: 25.77 KG/M2

## 2021-06-01 NOTE — TELEPHONE ENCOUNTER
RN cannot approve Refill Request    RN can NOT refill this medication med is not covered by policy/route to provider. Last office visit: 5/21/2019 Daniella Edmond FNP Last Physical: 10/17/2017 Last MTM visit: Visit date not found Last visit same specialty: 5/21/2019 Daniella Edmond FNP.  Next visit within 3 mo: Visit date not found  Next physical within 3 mo: Visit date not found      Hemalatha Montes, [facility] Triage/Med Refill 9/17/2019    Requested Prescriptions   Pending Prescriptions Disp Refills     baclofen (LIORESAL) 10 MG tablet 150 tablet 0       There is no refill protocol information for this order

## 2021-06-01 NOTE — TELEPHONE ENCOUNTER
Due for med check/CSA. OK to fill this month and have patient make appointment. Please have covering provider fill.

## 2021-06-01 NOTE — TELEPHONE ENCOUNTER
Controlled Substance Refill Request  Medication:   Requested Prescriptions     Pending Prescriptions Disp Refills     traMADol (ULTRAM) 50 mg tablet [Pharmacy Med Name: traMADol HCl Oral Tablet 50 MG] 180 tablet 0     Sig: Take 1-2 tablets ( mg) by mouth every 6 hours as needed for pain.     Date Last Fill: 7/23/19  [facility]   Submit electronically to pharmacy  Controlled Substance Agreement on File:   Encounter-Level CSA Scan Date - 09/19/2017:    Scan on 9/25/2017  9:14 AM (below)             Encounter-Level CSA Scan Date - 08/24/2017:    Scan on 8/28/2017 12:33 PM (below)             Encounter-Level CSA Scan Date - 01/13/2017:    Scan on 2/2/2017  3:15 PM (below)         Last office visit: Last office visit pertaining to requested medication was 5/21/19.

## 2021-06-02 ENCOUNTER — RECORDS - HEALTHEAST (OUTPATIENT)
Dept: ADMINISTRATIVE | Facility: CLINIC | Age: 51
End: 2021-06-02

## 2021-06-02 VITALS — BODY MASS INDEX: 26.52 KG/M2 | WEIGHT: 140.38 LBS

## 2021-06-02 VITALS — BODY MASS INDEX: 26.24 KG/M2 | HEIGHT: 61 IN | WEIGHT: 139 LBS

## 2021-06-02 VITALS — BODY MASS INDEX: 26.38 KG/M2 | WEIGHT: 139.6 LBS

## 2021-06-02 VITALS — BODY MASS INDEX: 26.57 KG/M2 | WEIGHT: 140.6 LBS

## 2021-06-02 VITALS — BODY MASS INDEX: 26.67 KG/M2 | WEIGHT: 141.13 LBS

## 2021-06-02 NOTE — TELEPHONE ENCOUNTER
Former patient of Edmond & has not established care with another provider.  Please assign refill request to covering provider per Clinic standard process.      RN cannot approve Refill Request    RN can NOT refill this medication Protocol failed and NO refill given      Kenzie Coronel, [facility] Triage/Med Refill 10/11/2019    Requested Prescriptions   Pending Prescriptions Disp Refills     lisinopril (PRINIVIL,ZESTRIL) 10 MG tablet [Pharmacy Med Name: Lisinopril Oral Tablet 10 MG] 90 tablet 0     Sig: TAKE 1 TABLET (10 MG) BY MOUTH ONCE DAILY       Ace Inhibitors Refill Protocol Failed - 10/10/2019  9:46 AM        Failed - Serum Potassium in past 12 months     No results found for: LN-POTASSIUM          Failed - Serum Creatinine in past 12 months     Creatinine   Date Value Ref Range Status   09/20/2018 0.71 0.60 - 1.10 mg/dL Final             Passed - PCP or prescribing provider visit in past 12 months       Last office visit with prescriber/PCP: 5/21/2019 Daniella Edmond FNP OR same dept: 5/21/2019 Daniella Edmond FNP OR same specialty: 5/21/2019 Daniella Edmond FNP  Last physical: 10/17/2017 Last MTM visit: Visit date not found   Next visit within 3 mo: Visit date not found  Next physical within 3 mo: Visit date not found  Prescriber OR PCP: DEREK Malik  Last diagnosis associated with med order: 1. Essential hypertension  - lisinopril (PRINIVIL,ZESTRIL) 10 MG tablet [Pharmacy Med Name: Lisinopril Oral Tablet 10 MG]; TAKE 1 TABLET (10 MG) BY MOUTH ONCE DAILY  Dispense: 90 tablet; Refill: 0    If protocol passes may refill for 12 months if within 3 months of last provider visit (or a total of 15 months).             Passed - Blood pressure filed in past 12 months     BP Readings from Last 1 Encounters:   05/21/19 117/80

## 2021-06-02 NOTE — TELEPHONE ENCOUNTER
RN cannot approve Refill Request    RN can NOT refill this medication med is not covered by policy/route to provider. Last office visit: 5/21/2019 Daniella Edmond FNP Last Physical: 10/17/2017 Last MTM visit: Visit date not found Last visit same specialty: 5/21/2019 Daniella Edmond FNP.  Next visit within 3 mo: Visit date not found  Next physical within 3 mo: Visit date not found      Sandra Henry, [facility] Triage/Med Refill 10/23/2019    Requested Prescriptions   Pending Prescriptions Disp Refills     baclofen (LIORESAL) 10 MG tablet 150 tablet 0     Sig: Take 2 tablets in a.m., 1 tablet at noon, 2 tablets in the evening.       There is no refill protocol information for this order

## 2021-06-02 NOTE — TELEPHONE ENCOUNTER
[facility] since Daniella Edmond no longer works here.

## 2021-06-02 NOTE — TELEPHONE ENCOUNTER
Medication refilled but patient needs to set up establish care visit or physical with new provider

## 2021-06-02 NOTE — TELEPHONE ENCOUNTER
First Attempt: LM for patient to call back to schedule either an Est Care/med check or Est Care/PX.

## 2021-06-03 ENCOUNTER — RECORDS - HEALTHEAST (OUTPATIENT)
Dept: ADMINISTRATIVE | Facility: CLINIC | Age: 51
End: 2021-06-03

## 2021-06-03 NOTE — TELEPHONE ENCOUNTER
Please inform patient that a refill was sent to the pharmacy of her pain medication  Due to clinic policy she is due to be seen at clinic every 2 to 3 months due to the medications that she is taking  She needs to establish care with a new provider for further prescriptions  90 tablet refill was sent to the pharmacy

## 2021-06-03 NOTE — TELEPHONE ENCOUNTER
Controlled Substance Refill Request  Medication:   Requested Prescriptions     Pending Prescriptions Disp Refills     traMADol (ULTRAM) 50 mg tablet [Pharmacy Med Name: traMADol HCl Oral Tablet 50 MG] 90 tablet 0     Sig: Take 1-2 tablets ( mg) by mouth every 6 hours as needed for pain.     Date Last Fill: 11/5/19  [facility]: [email or web address]  Submit electronically to pharmacy  Controlled Substance Agreement on File:   Encounter-Level CSA Scan Date - 09/19/2017:    Scan on 9/25/2017  9:14 AM           Encounter-Level CSA Scan Date - 08/24/2017:    Scan on 8/28/2017 12:33 PM           Encounter-Level CSA Scan Date - 01/13/2017:    Scan on 2/2/2017  3:15 PM       Last office visit: 5/21/19 Daniella REED

## 2021-06-03 NOTE — TELEPHONE ENCOUNTER
Medication Question or Clarification  Who is calling: Patient  What medication are you calling about? (include dose and sig) Tramadol not active  Who prescribed the medication?: Dr Page  What is your question/concern?: The patient was raising her voice about getting this refilled.  Writer did share that if patient has a new clinic and new provider not at this clinicthen she is not going to continue care here then the doctors will not reorder.  Pharmacy:   traMADol (ULTRAM) 50 mg tablet           Summary: Take 1-2 tablets ( mg) by mouth every 6 hours as needed for pain          Okay to leave a detailed message?: No  Site CMT - Please call the pharmacy to obtain any additional needed information.

## 2021-06-03 NOTE — TELEPHONE ENCOUNTER
Who is calling:  Patient   Reason for Call:  Patient states she signed  RUSTY form to send her  Medical recorders to [facility] . Patient is going to establish cares with new provider at [facility] .   Date of last appointment with primary care: 5/21/19  Okay to leave a detailed message: No

## 2021-06-03 NOTE — TELEPHONE ENCOUNTER
Dr Page gave a prescription for 90 tabs on 11/5/2019 and stated at that time no further refills until seen in clinic. CSA not up todate.

## 2021-06-03 NOTE — TELEPHONE ENCOUNTER
Former patient of Edmond & has not established care with another provider.  Please assign refill request to covering provider per Clinic standard process.    Controlled Substance Refill Request  Medication:   Requested Prescriptions     Pending Prescriptions Disp Refills     traMADol (ULTRAM) 50 mg tablet [Pharmacy Med Name: traMADol HCl Oral Tablet 50 MG] 180 tablet 0     Sig: Take 1-2 tablets ( mg) by mouth every 6 hours as needed for pain.     Date Last Fill: 9/6/19  [email or web address]   Submit electronically to pharmacy  Controlled Substance Agreement on File:   Encounter-Level CSA Scan Date - 09/19/2017:    Scan on 9/25/2017  9:14 AM           Encounter-Level CSA Scan Date - 08/24/2017:    Scan on 8/28/2017 12:33 PM           Encounter-Level CSA Scan Date - 01/13/2017:    Scan on 2/2/2017  3:15 PM       Last office visit: Last office visit pertaining to requested medication was 5/21/19.

## 2021-06-08 NOTE — PROGRESS NOTES
Assessment & Plan:  1. Neck pain, chronic  CSA completed today. Patient awaiting scheduling for cervical spine surgery. They are going to get her set up with pain management though Dr. Bergman at some point, but to keep prescribing we need to monitor drug screen urine and create CSA. Request records from Dr. Bergman.  - traMADol (ULTRAM) 50 mg tablet; Take 1-2 tablets ( mg total) by mouth every 8 (eight) hours as needed for pain.  Dispense: 120 tablet; Refill: 0  - baclofen (LIORESAL) 10 MG tablet; Take 2 tablets (20 mg total) by mouth 2 (two) times a day.  Dispense: 120 tablet; Refill: 0  - tiZANidine (ZANAFLEX) 4 MG tablet; Take 1 tablet (4 mg total) by mouth every 6 (six) hours as needed.  Dispense: 120 tablet; Refill: 0  - Drugs of Abuse 1,Urine    2. Bacterial skin infection of upper extremity  Eczema with underlying skin infection vs early cellulitis. Both forearms, however right appears worse. Refer to dermatology for further evaluation and treatment.  - Ambulatory referral to Dermatology  - fluconazole (DIFLUCAN) 150 MG tablet; Take 1 tablet (150 mg total) by mouth once for 1 dose.  Dispense: 2 tablet; Refill: 0  - cephalexin 500 mg tablet; Take 500 mg by mouth 3 (three) times a day for 10 days.  Dispense: 30 tablet; Refill: 0  - methylPREDNISolone (MEDROL DOSEPACK) 4 mg tablet; Take 1 tablet (4 mg total) by mouth daily. follow package directions  Dispense: 21 tablet; Refill: 0      There are no Patient Instructions on file for this visit.    Orders Placed This Encounter   Procedures     Drugs of Abuse 1,Urine     Ambulatory referral to Dermatology     Referral Priority:   Routine     Referral Type:   Consultation     Referral Reason:   Evaluation and Treatment     Requested Specialty:   Dermatology     Number of Visits Requested:   1     Medications Discontinued During This Encounter   Medication Reason     cyclobenzaprine (FLEXERIL) 10 MG tablet Formulary change     traMADol (ULTRAM) 50 mg tablet  Reorder     baclofen (LIORESAL) 10 MG tablet Reorder     tiZANidine (ZANAFLEX) 4 MG tablet Reorder           Chief Complaint:   Chief Complaint   Patient presents with     Medication Management     Medication Refill       History of Present Illness:  Amena is a 46 y.o. female presenting to the clinic today for medication refills, and to discuss worsening eczema.  Amena has a history of chronic back pain, she'll be following up Dr. Bergman for surgery in the upcoming months. She has an appointment set up with him in approximately 4 weeks to review her MRI and hopefully schedule surgery. Until that time she needs someone to manage her medications.   Symptoms have been worsening for the past few months.  She has been using baclofen and tizanidine for muscle spasms as well as tramadol for pain.   She is also needing to discuss worsening eczema mostly on her bilateral forearms.  She previously been using steroid cream which has not been working well.  At this point she is wondering if skin is infected.  She has not had fever or chills, but she does have difficulty getting skin to heal.  She is requesting a dermatology referral and any other treatment we deem necessary at this time.    Review of Systems:  All other systems are negative except as noted above.    PFSH:  Reviewed and updated.    Tobacco Use:  History   Smoking Status     Current Every Day Smoker     Last attempt to quit: 12/25/2014   Smokeless Tobacco     Never Used     Comment: e-cig still       Vitals:  Vitals:    01/13/17 1125   BP: 142/90   Patient Site: Left Arm   Patient Position: Sitting   Cuff Size: Adult Large   Pulse: 82   Resp: 16     Wt Readings from Last 3 Encounters:   01/22/16 150 lb (68 kg)   11/02/15 153 lb 1.9 oz (69.5 kg)       Physical Exam:  Constitutional:  Reveals an alert, cooperative, 47 yo female in no acute distress.  Vitals:  Per nursing notes.  Neck:  Supple, no lymphadenopathy,  thyroid not palpable. Decreased ROM due to  pain and muscle tightness.  Cardiac:  Regular rate and rhythm without murmurs, rubs, or gallops. Carotids without bruits. Legs without edema.   Lungs: Clear.  Respiratory effort normal.  Skin:   Bilateral forearms erythematous, right arm edematous.  Very dry flaky skin, some areas of scabbing diffusely across bilateral forearms.  warm to touch.       Data Reviewed:  Additional History from Old Records or Another Person Summarized (2 total): None.     Decision to Obtain Extra information (1 total): None.     Radiology Tests Summarized and Ordered (XRAY/CT/MRI/DXA) (1 total): None.    Labs Reviewed and Ordered (1 total): Drugs of Abuse urine.    Medicine Tests Summarized and Ordered (EKG/ECHO/COLONOSCOPY/EGD) (1 total): None.    Independent Review of EKG or X-Ray (2 each): None.    The visit lasted a total of 40 minutes face to face with the patient. Over 50% of the time was spent counseling and educating the patient about plan of care.    Medications:  Current Outpatient Prescriptions   Medication Sig Dispense Refill     ADVAIR DISKUS 250-50 mcg/dose DISKUS INHALE 1 PUFF BY INHALATION ROUTE TWICE DAILY 60 each 3     albuterol (PROAIR HFA) 90 mcg/actuation inhaler INHALE 2 PUFFS BY INHALATION ROUTE EVERY 4 TO 6 HOURS AS NEEDED 2 Inhaler 1     albuterol (PROVENTIL) 2.5 mg /3 mL (0.083 %) nebulizer solution INHALE 3 MILLILITERS (2.5 MG) BY NEBULIZATION ROUTE FOUR TIMES A DAY 75 mL 0     baclofen (LIORESAL) 10 MG tablet Take 2 tablets (20 mg total) by mouth 2 (two) times a day. 120 tablet 0     DICLOFENAC SODIUM/MISOPROSTOL (DICLOFENAC-MISOPROSTOL ORAL) Take by mouth. 50-0.2 MG       EPINEPHrine (EPIPEN 2-KYLE) 0.3 mg/0.3 mL (1:1,000) atIn Inject 0.3 mg into the shoulder, thigh, or buttocks once. Or as directed per MD       fluticasone (FLONASE) 50 mcg/actuation nasal spray 1-2 sprays into each nostril daily. 48 g 3     lisinopril (PRINIVIL,ZESTRIL) 10 MG tablet Take 1 tablet (10 mg total) by mouth daily. 90 tablet 3      loratadine-pseudoephedrine (CLARITIN-D 24 HOUR)  mg per 24 hr tablet TAKE 1 TABLET BY ORAL ROUTE ONCE DAILY 30 tablet 2     LORazepam (ATIVAN) 1 MG tablet Take 1 mg by mouth 3 (three) times a day.       nebulizer and compressor ([place] AEROSOL DELIVERY SYSTEM) Cherelle Nebulizer w/ mask and tubing for use prn 1 each 0     omeprazole (PRILOSEC) 20 MG capsule TAKE 1 CAPSULE BY ORAL ROUTE DAILY BEFORE A MEAL 90 capsule 0     OXcarbazepine (TRILEPTAL) 300 MG tablet Take by mouth. Pt states she's taking 2 600mg in AM and 2 600mg at night and 300mg at night.       QUEtiapine (SEROQUEL) 200 MG tablet Take by mouth.       tiZANidine (ZANAFLEX) 4 MG tablet Take 1 tablet (4 mg total) by mouth every 6 (six) hours as needed. 120 tablet 0     traMADol (ULTRAM) 50 mg tablet Take 1-2 tablets ( mg total) by mouth every 8 (eight) hours as needed for pain. 120 tablet 0     triamcinolone (KENALOG) 0.1 % cream APPLY A THIN LAYER TO ECZEMA PATCHES TWICE DAILY FOR A MAX OF 2 WEEKS 80 g 0     cephalexin 500 mg tablet Take 500 mg by mouth 3 (three) times a day for 10 days. 30 tablet 0     methylPREDNISolone (MEDROL DOSEPACK) 4 mg tablet Take 1 tablet (4 mg total) by mouth daily. follow package directions 21 tablet 0     No current facility-administered medications for this visit.        Total Data Points: 1    CLARISSA Malik, CNP    This note has been dictated using voice recognition software. Any grammatical or context distortions are unintentional and inherent to the software

## 2021-06-09 NOTE — TELEPHONE ENCOUNTER
Former patient of Daniella Edmond FNP & has not established care with another provider.  Please assign refill request to covering provider per Clinic standard process.      RN cannot approve Refill Request    RN can NOT refill this medication Protocol failed and NO refill given. Last office visit: 5/21/2019 Daniella Edmond FNP Last Physical: 10/17/2017 Last MTM visit: Visit date not found Last visit same specialty: 5/21/2019 Daniella Edmond FNP.  Next visit within 3 mo: Visit date not found  Next physical within 3 mo: Visit date not found      Claudia Montilla, [facility] Triage/Med Refill 7/24/2020    Requested Prescriptions   Pending Prescriptions Disp Refills     EPINEPHrine (EPIPEN/ADRENACLICK/AUVI-Q) 0.3 mg/0.3 mL injection [Pharmacy Med Name: EPINEPHrine Injection Solution Auto-injector 0.3 MG/0.3ML] 2 each 0     Sig: Inject 0.3 milliliter (0.3 mg) into the shoulder, thigh, or buttocks once for 1 dose Or as directed per doctor       Epinephrine (Bee Sting) Kit Refill Protocol Failed - 7/22/2020 12:41 PM        Failed - Patient has had office visit/physical in last 1 year     Last office visit with prescriber/PCP: 5/21/2019 Daniella Edmond FNP OR same dept: Visit date not found OR same specialty: 5/21/2019 Daniella Edmond FNP  Last physical: 10/17/2017 Last MTM visit: Visit date not found   Next visit within 3 mo: Visit date not found  Next physical within 3 mo: Visit date not found  Prescriber OR PCP: DEREK Malik  Last diagnosis associated with med order: 1. Bee allergy status  - EPINEPHrine (EPIPEN/ADRENACLICK/AUVI-Q) 0.3 mg/0.3 mL injection [Pharmacy Med Name: EPINEPHrine Injection Solution Auto-injector 0.3 MG/0.3ML]; Inject 0.3 milliliter (0.3 mg) into the shoulder, thigh, or buttocks once for 1 dose Or as directed per doctor   Dispense: 2 each; Refill: 0    If protocol passes may refill for 12 months if within 3 months of last provider visit (or a total of 15 months).
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
## 2021-06-11 NOTE — PROGRESS NOTES
Neurosurgery consultation was requested by: Dr. Andrade. And Dr. Gant.   Pt had a MRI done for neck pain and in MRI found a brain tumor. Pt denies HA, balance and gait are good, vision is good, and speech is fluent.   Tamiko,CMA

## 2021-06-11 NOTE — PROGRESS NOTES
Neurosurgery consultation was requested by: Dr. Andrade.   Pt had a MRI done for neck pain and in MRI found a brain tumor. Pt denies HA, balance and gait are good, vision is good, and speech is fluent.   Amena has had a consult with Dr. Gant and Dr. Zhou.   Tamiko,[facility]

## 2021-06-11 NOTE — PROGRESS NOTES
Preop Assessment: Amena Rosario presents for pre-op review.  Surgeon: Dr. Gant, Dr. Zhou, Dr. Luna  Name of Surgery: CRANIOTOMY & RESECTION OF LEFT CEREBELLAR MENINGIOMA WITH MICROSCOPE, STEALTH  Diagnosis: Left cerebellar meningioma, posterior fossa   Date of Surgery: 17  Time of Surgery: 730  Hospital: [facility]  H&P: 17, Daniella Edmond, cleared pt for surgery  History of ASA, NSAIDS, vitamin and/or herbal supplements within 10 days: No  History of blood thinners: No  History of anti-seizure med's: Yes, on trileptal for bipolar dx  Review of systems: feels well today    Diagnostics:  Labs: 17 with abnormal Sodium, wbc and PTT:  REPEAT TODAY: STILL ABNORMAL, WITH GET RENAL AND HEM CONSULT STAT  CXR:  negative  [id number] NSR  Other: will have angio/embolization and venogram 7-10-17 and pre op MRI with fiducials AM of surgery  Films: MRI/MRA 17 and MRV 17 in PACS    All questions answered regarding surgery and expected pre and postoperative course including rehabilitation phase.     Reviewed with patient: Arrive on 7-10-17 at 1130 for angio/embolization. Nothing to eat or drink after midnight the night before surgery and bring all pertinent films to the hospital the day of surgery.  Continue to refrain from NSAIDS (Ibuprofen, Aleve, Naprosyn) ASA or over the counter herbal medication or supplements, anticoagulants and blood thinners.    Preop skin preparations and instructions provided.      Repeat labs remain abnormal.  Surgery will need to be cancelled per Dr. Gant.  Renal and hematology consults placed.  Pt has been on Trileptal for 10 years for her bipolar.  She was on lithium prior to this.    She reports her psychiatrist recently retired so trileptal is being managed by PCP.  Call placed to Daniella Edmond NP to discuss need for pt to come off trileptal and get sodium normalized prior to surgery.     Marley Patino RN     Marley Patino RN

## 2021-06-11 NOTE — PROGRESS NOTES
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 This is a intermediate risk surgery and the patient has no increased risk for major cardiac complications based on exam and history and appears to be medically stable for the proposed procedure.      46 y.o. female with planned surgery as above.    Known risk factors for perioperative complications: recent labs show hyponatremia, try to correct prior to surgery.    Difficulty with intubation is not anticipated.    Cardiac Risk Estimation: low risk.    Current medications which may produce withdrawal symptoms if withheld perioperatively: chronic pain medications.       Plan:      1. Preoperative workup as follows ECG, hemoglobin, electrolytes.  2. Change in medication regimen before surgery: discontinue ASA 14 days before surgery and d/c supplements, d/c claritin D 1 week prior to surgery..  3. Prophylaxis for cardiac events with perioperative beta-blockers: not indicated.  4. Invasive hemodynamic monitoring perioperatively: not indicated.  5. Deep vein thrombosis prophylaxis postoperatively:regimen to be chosen by surgical team.  6. Surveillance for postoperative MI with ECG immediately postoperatively and on postoperative days 1 and 2 AND troponin levels 24 hours postoperatively and on day 4 or hospital discharge (whichever comes first): not indicated.  7. Other measures: Consultation with hospitalist for in-hospital postoperative management of medications.   Will update Surgeon with lab work, see what reccomendations they have if any for low sodium levels, and if they would like a recheck.      Subjective:      Amena Rosario is a 46 y.o. female who presents to the office today for a preoperative consultation at the request of surgeon Dr. Luna, Dr. Gant, Dr. Zhou  who plans on performing right temporal craniotomy with removal of right  temporal glioma with stealth on July 11. This consultation is requested for the specific conditions prompting preoperative evaluation (i.e. because of potential affect on operative risk): none. Planned anesthesia: general. The patient has the following known anesthesia issues: none. Patients bleeding risk: no recent abnormal bleeding and no remote history of abnormal bleeding. Patient does not have objections to receiving blood products if needed.    History of present illness: Amena is a 46-year-old female here today for preoperative consultation.  She is generally healthy with medical history that includes asthma, bipolar disorder, allergies.  She also has a history of some chronic neck pain due to cervical disc protrusion.  She had had a MRI of the C-spine done which showed a questionable area that should be evaluated further.  After this a MRI of the brain and carotids was done which showed a large left posterior fossa meningioma.  She saw neurosurgery after this was found and it was decided to pursue removal of this lesion due to the size.  There is also some obstruction in the sinus related to this lesion and given her age they decided to pursue removal.  There will be several surgeons participating in the surgery.  Other than some anxiety regarding the upcoming procedure she is doing well.  Neurological exam is intact and has been throughout.  Her bipolar is under good control with medications.  Blood pressure is stable today.  We will check labs as requested by surgeon and pregnancy test leading into surgery.  She has had no recent infections, fevers or chills.  No recent palpitations, chest pain or shortness of breath.  She is tolerated previous procedures with anesthesia well without side effect.  No bleeding problems with previous surgeries or procedures.    The following portions of the patient's history were reviewed and updated as appropriate: allergies, current medications, past family history, past  "medical history, past social history, past surgical history and problem list.    Review of Systems  Constitutional: negative  Eyes: negative  Ears, nose, mouth, throat, and face: negative  Respiratory: negative  Cardiovascular: negative  Gastrointestinal: negative  Genitourinary:negative  Integument/breast: negative  Hematologic/lymphatic: negative  Musculoskeletal:positive for myalgias and neck pain  Neurological: negative  Behavioral/Psych: positive for anxiety  Endocrine: negative  Allergic/Immunologic: negative      Objective:     /72 (Patient Site: Left Arm, Patient Position: Sitting, Cuff Size: Adult Regular)  Pulse 76  Temp 98.5  F (36.9  C) (Oral)   Resp 12  Ht 5' 0.5\" (1.537 m)  Wt 134 lb 1.6 oz (60.8 kg)  LMP 06/08/2017  BMI 25.76 kg/m2  General: Patient appears to be in no acute distress.  Eyes: Inferior palpebral conjunctiva clear and pink, no exudates or tearing. Sclera are white. Eyelids symmetrical, no erythema, flakiness, fasciculations, or ptosis. Pupils react equally to Light and accommodation. EOMS intact. No lid lag, no nystagmus, corneal reflex equal bilaterally, cornea and lens clear, red reflex present, optic disc cream colored with well defined borders. Retina pink, no hemorrhages or exudates.  Ears: No nodules, lesions, masses, discharge or tenderness in auricles or mastoid area. No cerumen in ear canals. Tympanic membranes pearly gray, normal bony landmarks and cone of light bilaterally, no bulges or perforations.   Oropharynx: Buccal mucosa pink, moist, no lesions. Tongue midline, spongy, pink. Uvula midline. Pharynx with no erythema, no exudates. Tonsils + 1.  Neck: Full range of motion, no pain with palpation of spine or paraspinal muscles.  Lungs: Unlabored. clear breath sounds heard throughout lung fields.   Heart: Regular rate and rhythm.  Abdomen: Soft rounded abdomen. Bowel sounds heard in all four quadrants; liver, spleen, and kidney not palpable, no tenderness on " palpation of abdomen, no CVA tenderness.    Musculoskeletal: muscles appear symmetric, muscle strength appropriate (Bilateral upper and lower extremities strength 5/5) and equal bilaterally full range of active and passive motion, spine and extremities in good alignment.  Neuro: Coordinated, smooth gait, balance, rapid alternating movements, sensory functioning, and cranial nerves II-XII grossly intact. DTR's+2 bilaterally brachioradialis, knee, ankle. Rhomberg s wnl.        Predictors of intubation difficulty:   Morbid obesity? no   Anatomically abnormal facies? no   Prominent incisors? no   Receding mandible? no   Short, thick neck? no   Neck range of motion: normal   Mallampati score: I (soft palate, uvula, fauces, and tonsillar pillars visible)   Dentition: No chipped, loose, or missing teeth.    Cardiographics  ECG: normal sinus rhythm, no blocks or conduction defects, no ischemic changes  Echocardiogram: not done    Imaging  Chest x-ray: not indicated.       Lab Review   Lab Results   Component Value Date     (L) 06/27/2017    K 4.3 06/27/2017    CL 88 (L) 06/27/2017    CO2 24 06/27/2017    BUN 7 (L) 06/27/2017    CREATININE 0.62 06/27/2017    CALCIUM 9.0 06/27/2017     Lab Results   Component Value Date    WBC 2.5 (L) 06/27/2017    HGB 12.3 06/27/2017    HCT 36.3 06/27/2017    MCV 89 06/27/2017     06/27/2017       Will update Surgeon with lab work, see what reccomendations they have if any for low sodium levels, and if they would like a recheck.     Daniella Edmond, CNP    This note has been dictated using voice recognition software. Any grammatical or context distortions are unintentional and inherent to the software

## 2021-06-11 NOTE — PROGRESS NOTES
Neurosurgery consultation was requested by: Dr. Andrade.   Pt had a MRI done for neck pain and in MRI found a brain tumor. Pt denies HA, balance and gait are good, vision is good, and speech is fluent.   NOAH Morrison

## 2021-06-11 NOTE — PROGRESS NOTES
Amena Rosario is a very pleasant right-handed 46-year-old who is here to discuss surgery for her left cerebellar meningioma.  She is accompanied here by her mom.  I reviewed the imaging with her as well as the risks benefits alternatives with her the thing that makes her meningioma complexes that there is occlusion and invasion of the left transverse sinus.  Otherwise risks include but are not limited to infection, bleeding requiring transfusion or other procedures, stroke, neurological injury with weakness, sensory deficits, cranial nerve deficits, cerebrospinal fluid leak, seizures, incomplete resection of tumor, all requiring further procedures with patient and her mom.  No guarantees were implied or promised. She wishes to proceed. We will plan her surgery for the week following July 4th as she has plans with family for that holiday.  Thank you for giving me the opportunity to see this very pleasant patient.  If you have any questions about her or any othe patients please feel free to contact me.  Of note I spent greater than 25 minutes counseling the patient regarding her pathology and treatment plan.    Sylwia Zhou

## 2021-06-11 NOTE — PROGRESS NOTES
Assessment & Plan:  1. Hyponatremia  HM1(CBC and Differential)    Comprehensive Metabolic Panel    Magnesium    HM1 (CBC with Diff)   2. Prolonged PTT       Patient has been stable with sodium since discharge, but Dr. Gant would like sodium to be at least 132 prior to doing surgery. We will check today and make changes in medications and/or fluid restriction as necessary. Hematology has cleared patient regarding PTT. We will monitor labs today and make sure they are remaining stable.    There are no Patient Instructions on file for this visit.    Orders Placed This Encounter   Procedures     Comprehensive Metabolic Panel     Magnesium     HM1 (CBC with Diff)     Medications Discontinued During This Encounter   Medication Reason     loratadine-pseudoephedrine (CLARITIN-D 24 HOUR)  mg per 24 hr tablet Therapy completed               Chief Complaint:   Chief Complaint   Patient presents with     Follow-up       History of Present Illness:  .   Adriana is a 46-year-old here today for follow-up today after surgery cancellation due to hyponatremia a couple of weeks ago.  She was hospitalized after sodium dropped to critical levels.  It returned to normal after hospitalization or low normal sitting at 128.  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  She is currently continuing a 1500 cc daily fluid restriction and doing very well on it.  She has been quite strict with her self.  She is hoping that levels will return normal.  Since discharge she has also followed up with hematology due toAnd PTT.  Neurology wanted to get the input from hematology regarding fitness for surgery.  After reviewing lab work and discussing with her they had decided that blood counts were adequate to proceed with surgery.  They did a fair amount of coagulation studies as well as checking for lupus anticoagulant which returned normal.  She is here today to  follow-up on her lab work specifically her hyponatremia.  After surgery was canceled I had spoke with Dr. Gant's nurse and she had related that they would like her sodium to be at least 132 before proceeding with surgery.  On discharge from the hospital is 128 and will recheck today.    There is also some concern that her Trileptal which she takes her bipolar disorder could potentially be contributing to her hyponatremia.  She is quite unwilling to mess around with medications especially given the risks of surgery.  I agree with her and if we can get this under control without affecting her mood medications that would be ideal.  She had been seeing [facility] for psychiatry, however her psychiatrist retired and she has not been able to reestablish care with anyone.  If it will come down to changing her Trileptal we would need to find her new primary psychiatrist.    Review of Systems:  All other systems are negative except as noted above.    PFSH:  reviewed, updated.     Tobacco Use:  History   Smoking Status     Former Smoker     Quit date: 12/25/2014   Smokeless Tobacco     Never Used     Comment: e-cig still       Vitals:  Vitals:    07/18/17 1521   BP: 112/66   Patient Site: Left Arm   Patient Position: Sitting   Cuff Size: Adult Regular   Resp: 16   Weight: 134 lb 8 oz (61 kg)     Wt Readings from Last 3 Encounters:   07/18/17 134 lb 8 oz (61 kg)   07/06/17 135 lb 1.6 oz (61.3 kg)   07/03/17 135 lb 1.6 oz (61.3 kg)       Physical Exam:  Constitutional:  Reveals an alert, cooperative, 46 year old female in no acute distress.  Vitals:  Per nursing notes.  Eyes: PERRLA, funduscopic exam within normal limits.  HENT: TMs clear bilaterally,Oropharynx without erythema, posterior nasal drainage or thrush. Neck supple, no lymphadenopathy,  thyroid not palpable.  Cardiovascular:  Regular rate and rhythm without murmurs, rubs, or gallops. Carotids without bruits. Legs without edema.   Respiratory: Clear.   Respiratory effort normal.  Psychiatric:  Mood appropriate, memory intact.     Data Reviewed:  Additional History from Old Records or Another Person Summarized (2 total): None.     Decision to Obtain Extra information (1 total): None.     Radiology Tests Summarized and Ordered (XRAY/CT/MRI/DXA) (1 total): None.    Labs Reviewed and Ordered (1 total): labs as ordered    Medicine Tests Summarized and Ordered (EKG/ECHO/COLONOSCOPY/EGD) (1 total): None.    Independent Review of EKG or X-Ray (2 each): None.    The visit lasted a total of 25 minutes face to face with the patient. Over 50% of the time was spent counseling and educating the patient about plan of care.    Medications:  Current Outpatient Prescriptions   Medication Sig Dispense Refill     baclofen (LIORESAL) 10 MG tablet Take 20 mg by mouth 2 (two) times a day.       EPINEPHrine (EPIPEN 2-KYLE) 0.3 mg/0.3 mL (1:1,000) atIn Inject 0.3 mg into the shoulder, thigh, or buttocks once. Or as directed per MD       fluticasone (FLONASE) 50 mcg/actuation nasal spray 1-2 sprays into each nostril daily.       fluticasone-salmeterol (ADVAIR DISKUS) 250-50 mcg/dose DISKUS Inhale 1 puff 2 (two) times a day. 180 each 1     lisinopril (PRINIVIL,ZESTRIL) 10 MG tablet Take 1 tablet (10 mg total) by mouth daily. 30 tablet 11     LORazepam (ATIVAN) 1 MG tablet Take 1 mg by mouth every 6 (six) hours as needed for anxiety.       omeprazole (PRILOSEC) 20 MG capsule Take 20 mg by mouth daily before breakfast.       OXcarbazepine (TRILEPTAL) 600 MG tablet Take 1,200 mg by mouth 2 (two) times a day.       PROAIR HFA 90 mcg/actuation inhaler Inhale 2 puffs by mouth every 4 to 6 hours as needed 17 g 0     QUEtiapine (SEROQUEL) 200 MG tablet Take 200 mg by mouth at bedtime as needed.        traMADol (ULTRAM) 50 mg tablet TAKE 1-2 TABLETS BY ORAL ROUTE EVERY 8 HOURS AS NEEDED FOR PAIN  120 tablet 0     triamcinolone (KENALOG) 0.1 % cream Apply 1 application topically see administration  instructions. Apply a thin layer to eczema patches twice daily for a max of 2 weeks.       albuterol (PROVENTIL) 2.5 mg /3 mL (0.083 %) nebulizer solution INHALE 3 MILLILITERS (2.5 MG) BY NEBULIZATION ROUTE FOUR TIMES A DAY 75 mL 0     traMADol (ULTRAM) 50 mg tablet Take  mg by mouth every 8 (eight) hours as needed for pain.       No current facility-administered medications for this visit.        Total Data Points: 1    CLARISSA Malik, CNP    This note has been dictated using voice recognition software. Any grammatical or context distortions are unintentional and inherent to the software

## 2021-06-11 NOTE — PROGRESS NOTES
I met with the patient and her mother.  Patient is a 46-year-old female.  Generally good health.  No heart disease,  cancer, diabetes.  Does have a history of bipolar disorder and asthma.  Present symptoms include headaches and loss of balance.  On MRI she has a very large meningioma in the left posterior fossa.  I showed the pictures to her and her mother.  We talked about the nature of the problem, the nature of the proposed surgery, the rationale for doing it, the goals, benefits, alternatives, and significant risks and complications.  I answered all their questions.  They said they were satisfied with the explanation and understood.  They requested surgery.  They gave informed consent to go ahead with surgery.  The patient and her mother are satisfied with the plan.  I am also going to order preop angiogram and venogram and embolization.  We also need Stealth.  I personally would do the surgery in sitting position but that is negotiable.  Total time 25 minutes, more than 50% spent counseling and/or coordinating care.

## 2021-06-11 NOTE — PROGRESS NOTES
NEUROSURGERY CONSULTATION NOTE:    Assessment: Left posterior fossa meningioma, Large.    Plan: I have recommended surgical resection.  This is quite a large lesion in a young woman.  Should be resected.  I have asked her to see my partner Dr. Sylwia Zhou.  I told her and her mother that usually we have 2 surgeons.  She has no specific time constraints.  I explained that there would be risk of infection because of skin incision, risk of bleeding due to the sinus involvement and possible neurological problems related to issues with either of these problems.  I will plan to go ahead with surgery after my partner sees her.    Amena Rosario   [address]  [place] [address]  46 y.o. female is sent to me in consultation   by DEREK Malik     CC:    Chief Complaint   Patient presents with     Brain Tumor       HPI:  Neurosurgery consultation was requested by: Dr. Andrade.   Pt had a MRI done for neck pain and in MRI found a brain tumor.  Pt denies balance issues and gait is good, vision is good, and speech is fluent.   She does have some intermittent headaches that are non-localizable.  She feels that these are related to her neck symptoms.  No bladder or bowel disturbance.  Cognition is intact    PROBLEM LIST:  1.  Posterior fossa meningioma   2.  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  She has mild hypertension.  She recently stopped her lisinopril.  Otherwise, the review is negative.      Past Medical History:   Diagnosis Date     Abnormal Pap Smear Of Cervix     Created by [facility] Annotation: Jan 21 2008  3:33PM - Elba Campos: 01, '00--LGSIL      Acute Hepatitis, C Virus     Created by [employer]  Replacement Utility updated for latest IMO load     Allergies     Created by [facility] Annotation: Jan 21 2008  3:33PM - Elba Campos: seasonalpets      Anxiety      Asthma      Bipolar Disorder     Created by  [employer]  Replacement Utility updated for latest IMO load     Breast cyst since 2007    right     Closed Separation Of The Acromioclavicular Joint     Created by [employer]version  Replacement Utility updated for latest IMO load     Eczema 1/22/2016     Esophageal reflux     Created by Conversion      Hypertension     Created by Conversion      Insomnia      Lymphadenopathy     Created by Conversion [facility] Annotation: Jul 2 2012  9:46AM - Elba Campos: STEPHAN posterior  cervical lymph node  Replacement Utility updated for latest IMO load     Moderate persistent asthma without complication     Created by Conversion      Protrusion of cervical intervertebral disc 5/15/2017         Past Surgical History:   Procedure Laterality Date     ac clavical separation       SHOULDER SURGERY Left          MEDICATIONS:  Current Outpatient Prescriptions   Medication Sig Dispense Refill     albuterol (PROVENTIL) 2.5 mg /3 mL (0.083 %) nebulizer solution INHALE 3 MILLILITERS (2.5 MG) BY NEBULIZATION ROUTE FOUR TIMES A DAY 75 mL 0     baclofen (LIORESAL) 10 MG tablet TAKE TWO TABLETS BY MOUTH TWICE DAILY 120 tablet 0     EPINEPHrine (EPIPEN 2-KYLE) 0.3 mg/0.3 mL (1:1,000) atIn Inject 0.3 mg into the shoulder, thigh, or buttocks once. Or as directed per MD       fluticasone (FLONASE) 50 mcg/actuation nasal spray 1-2 sprays into each nostril daily. 48 g 3     fluticasone-salmeterol (ADVAIR DISKUS) 250-50 mcg/dose DISKUS Inhale 1 puff 2 (two) times a day. 180 each 1     lisinopril (PRINIVIL,ZESTRIL) 10 MG tablet Take 1 tablet (10 mg total) by mouth daily. 30 tablet 11     loratadine-pseudoephedrine (CLARITIN-D 24 HOUR)  mg per 24 hr tablet TAKE 1 TABLET BY ORAL ROUTE ONCE DAILY 30 tablet 2     LORazepam (ATIVAN) 1 MG tablet Take 1 mg by mouth 3 (three) times a day.       nebulizer and compressor ([employer] AEROSOL DELIVERY SYSTEM) Cherelle Nebulizer w/ mask and tubing for use prn 1 each 0     omeprazole (PRILOSEC) 20 MG capsule TAKE 1 CAPSULE  "BY ORAL ROUTE DAILY BEFORE A MEAL 90 capsule 3     OXcarbazepine (TRILEPTAL) 300 MG tablet Take by mouth. Pt states she's taking 2 600mg in AM and 2 600mg at night and 300mg at night.       PROAIR HFA 90 mcg/actuation inhaler Inhale 2 puffs by mouth every 4 to 6 hours as needed 17 g 0     QUEtiapine (SEROQUEL) 200 MG tablet Take by mouth.       traMADol (ULTRAM) 50 mg tablet TAKE 1-2 TABLETS BY MOUTH EVERY 8 HOURS AS NEEDED FOR PAIN 120 tablet 0     triamcinolone (KENALOG) 0.1 % cream APPLY A THIN LAYER TO ECZEMA PATCHES TWICE DAILY FOR A MAX OF 2 WEEKS 80 g 0     meloxicam (MOBIC) 7.5 MG tablet Take 7.5 mg by mouth as needed for pain.       No current facility-administered medications for this visit.          ALLERGIES/SENSITIVITIES:     Allergies   Allergen Reactions     Aspirin (Tartrazine Only) Nausea Only     Carisoprodol Rash     Loratadine Rash     Propoxyphene N-Acetaminophen Nausea Only       PERTINENT SOCIAL HISTORY:   Social History     Social History     Marital status: Single     Spouse name: N/A     Number of children: N/A     Years of education: N/A     Social History Main Topics     Smoking status: Current Every Day Smoker     Last attempt to quit: 12/25/2014     Smokeless tobacco: Never Used      Comment: e-cig still     Alcohol use None     Drug use: None     Sexual activity: Not Asked       FAMILY HISTORY:  Family History   Problem Relation Age of Onset     Breast cancer Mother 55     Breast cancer Maternal Grandmother 85     Heart attack Maternal Grandmother      Cancer Father      Hypertension Father      Diabetes Maternal Uncle      Cancer Paternal Aunt      Cancer Paternal Uncle      Stroke Paternal Uncle      Diabetes Maternal Grandfather      Heart attack Maternal Grandfather      Heart attack Paternal Grandmother         PHYSICAL EXAM:   Constitutional: /68  Pulse 80  Ht 5' 1\" (1.549 m)  Wt 135 lb (61.2 kg)  LMP 04/24/2017 (Exact Date)  SpO2 98%  BMI 25.51 kg/m2    General " appearance: Appropriately groomed.  No acute distress.  Interactive.     Mental Status: Mental status: Alert and oriented, mood and affect appropriate, language reception and expression normal, recent and remote memory is normal, higher cortical function normal. Attention span, concentration and ability to follow commands is normal.       Cranial Nerves: Face is symmetric.  Extraocular movements are full, conjugate and without nystagmus.  Hearing is preserved.  Shoulder position is symmetric.  Tongue is midline with normal motion.      Motor: Motor exam nl bilateral UE and LE. Tone nl, bulk nl and strength 5/5 all groups.      Sensory: Sensory exam by subjective report intact to LT,PP,Position and Vib. in the UE and  LE.     Station and Gait:  Station and Gait- nl stride length,  balance and france..     Reflexes; supinator, biceps, triceps, knee/ ankle jerk intact. No hoffmans/babinski/ clonus.    IMAGING:  I have personally reviewed the images and discussed the findings with Amena Rosario  and her mother.  She has a 3.5 x 3.1 x 3.4 lesion in the left side of the posterior fossa that does compress the left cerebellar hemisphere.  This does appear to invade the left transverse sinus.  On MRA/MRV she has occlusion of the mid and distal nondominant left transverse sinus.  She also has occlusion of the proximal left sigmoid sinus.  She has minimal mass-effect on the cerebellar hemisphere and fourth ventricle.  The ventricles are normal in size.  She has no other intracranial lesion.    CC:     Nevaeh Andrade, [id number] [place] [address]

## 2021-06-11 NOTE — CONSULTS
[facility] Hematology and Oncology Consult Note    Patient: Amena Rosario  MRN: [id number]  Date of Service: 07/06/2017      Reason for Visit    Referred by Dr. Elvia Gant regarding elevated PTT.  preop craniotomy    Assessment/Plan    ECOG Performance   ECOG Performance Status: 1  Distress Assessment  Distress Assessment Score: 5    A 46-year-old female with a prolonged PTT without evidence of bleeding or thrombosis.  It was found for preoperative evaluation for craniotomy.  She has mild neutropenia from undetermined etiology.     -I review her blood work entirely.  She is noted to have mild neutropenia and normocytic anemia in the last few weeks and not clearly know the etiology at this point.  She denies any other illnesses except recent hospitalization with hyponatremia.  Other blood counts were in normal range.  Regarding her coagulation study, noted to have mildly elevated PTT with normal PT.  I discussed about evaluation of abnormal coagulation times and I would start with repeating a PTT/PT and inhibitor screening for APTT.  I would also check for prothrombin time as well.  I would also check for lupus anticoagulant and would further testing to assess for extrinsic coagulation pathway factors.   -I also explained that I would repeat her blood count today.  Her current level of neutrophils are adequate for planned surgery and she should not be at higher risk for infection.   -I will call her with the results once the are available with next steps in plan of care.    7/12/17:  Addendum:  Reviewed the results over the phone. I informed her (and the mother) that slightly prolonged PTT is likely that she has a low level antibody (based on corrected (although not to the level of sample) PTT in mixing study). No evidence of factor deficiency. I do not have any reservation for the upcoming neurosurgery procedure with routine surgical care guided by neurosurgery.    She will need a DVT prophylaxis as recommended by  neurosurgery.     Nico Good MD    Problem List    1. Prolonged PTT  APTT Inhibitor Screen    Lupus Anticoagulant    INR    APTT(PTT)    Fibrinogen    Von Willebrand Factor Activity (VWF:Act)    Von Willebrand Antigen    Factor 8 Assay    Thrombin Time    HM1(CBC and Differential)    Hepatic Profile    HM1 (CBC with Diff)     ______________________________________________________________________________    History    Ms. Amena Rosario is a very pleasant 45 yo female accompanied by her mother today.  She was diagnosed with meningioma in the left posterior fossa found incidentally after evaluation for headaches, neck pain, but later this could be related to her neck symptom. She is recommended for resection by neurosurgery.  During the preop exam, she was found to have severe hyponatremia which she was recently hospitalized.  In addition, she was found to have elevated PTT with normal PT she was not on any anticoagulant. She had 2 PTT testing 3 days apart on 6/27/17 and 6/30/17 with 41-43 seconds.  Her INR was normal at 1.02.  Surgery has been delayed mainly due to hyponatremia.    Upon further questioning, she has regular menstrual cycle with about 5 days of bleeding..  No previous history of blood transfusion not requiring iron replacement therapy.  She had some dental procedure but did not recall any excessive bleeding.  She had a left shoulder surgery about 7 years ago and no perioperative complication including bleeding or thrombosis.  Denies any personal history of DVT or PE.  No other bleeding or bruising.  Denies previous history of autoimmune disease or rheumatoid arthritis or SLE.  She has previous history of hepatitis C which was treated completely in 2003.  Denies history of chronic liver disease.    No family history of DVT or PE.  Denies family history of bleeding disorder    Today she is overall doing well and to discuss about further steps in evaluation of elevated PTT.     Past History    Past  Medical History:   Diagnosis Date     Abnormal Pap Smear Of Cervix     Created by [facility] Annotation: Jan 21 2008  3:33PM - Elba Campos: 01, '00--LGSIL      Acute Hepatitis, C Virus     Created by Conversion  Replacement Utility updated for latest IMO load     Allergies     Created by [facility] Annotation: Jan 21 2008  3:33PM Elba Garcia: seasonalpets      Anxiety      Asthma      Bipolar Disorder     Created by Conversion  Replacement Utility updated for latest IMO load     Breast cyst since 2007    right     Closed Separation Of The Acromioclavicular Joint     Created by Conversion  Replacement Utility updated for latest IMO load     Eczema 1/22/2016     Esophageal reflux     Created by Conversion      HPV (human papilloma virus) infection      Hypertension     Created by Conversion      Insomnia      Lymphadenopathy     Created by [facility] Annotation: Jul 2 2012  9:46AM - Elba Campos: R posterior  cervical lymph node  Replacement Utility updated for latest IMO load     Meningioma 7/1/2017     Meningioma of cerebellum     left cerebellar     Moderate persistent asthma without complication     Created by Conversion      Protrusion of cervical intervertebral disc 5/15/2017     SIADH (syndrome of inappropriate ADH production) 7/1/2017    Family History   Problem Relation Age of Onset     Breast cancer Mother 55     Breast cancer Maternal Grandmother 85     Heart attack Maternal Grandmother      Cancer Father      Hypertension Father      Diabetes Maternal Uncle      Cancer Paternal Aunt      Cancer Paternal Uncle      Stroke Paternal Uncle      Diabetes Maternal Grandfather      Heart attack Maternal Grandfather      Heart attack Paternal Grandmother       Left shoulder surgery for separation of a C joint in 2009 or 2010 Social History     Social History     Marital status: Single     Spouse name: N/A     Number of children: N/A     Years of education: N/A  "    Occupational History     Not on file.     Social History Main Topics     Smoking status: Former Smoker     Quit date: 12/25/2014     Smokeless tobacco: Never Used      Comment: e-cig still     Alcohol use No      Comment: \"some holidays\"     Drug use: No     Sexual activity: No     Other Topics Concern     Not on file     Social History Narrative        Allergies    Allergies   Allergen Reactions     Aspirin (Tartrazine Only) Nausea Only     Carisoprodol Rash     Morphine (Pf) Other (See Comments)     Throat tightness     Propoxyphene N-Acetaminophen Nausea Only       Review of Systems    General  General (WDL): Exceptions to WDL  Fatigue: Yes - Recent (Less than 3 months)  Fever: None  Generalized Muscle Weakness: Yes - Chronic (Greater than 3 months)  Weight Loss: Yes - Recent (Greater than 3 months) (trying to lose weight)  ENT  ENT (WDL): Exceptions to WDL  Vertigo (Dizziness): Yes - Chronic (Greater than 3 months)  Changes in vision: Yes - Chronic (Greater than 3 months)  Glasses or Contacts: Yes - Chronic (Greater than 3 months) (uses readers)  Hearing loss: None  Hearing Aids: None  Tinnitus: Yes - Chronic (Greater than 3 months) (intermittent \"chimes\" when yawns or stretches)  Pain/Pressure in ears: None  Sinus Congestion/Drainage: Yes - Chronic (Greater than 3 months)  Hoarseness: Yes - Chronic (Greater than 3 months) (only when talk a lot)  Sore Throat: None  Dental Problems: Yes - Chronic (Greater than 3 months)  Dentures: Yes - Chronic (Greater than 3 months) (partial upper-doesn't wear)  Respiratory  Respiratory (WDL): Exceptions to WDL  Dyspnea: Yes - Chronic (Greater than 3 months) (due to chronic asthma)  hemoptysis: None  Is patient on O2?: None  Cough: Yes - Chronic (Greater than 3 months)  Non-Cardiac Chest Pain: None  Cardiovascular  Cardiovascular (WDL): Exceptions to WDL  Palpitations: None  Edema Limbs: Yes - Chronic (Greater than 3 months) (left leg)  Irregular Heart Beat: None  Chest " "Pain: None  Lightheadedness: Yes - Chronic (Greater than 3 months)  Endocrine  Endocrine (WDL): Exceptions to WDL  Heat Intolerance: Yes - Chronic (Greater than 3 months)  Excessive Thirst: Yes - Recent (Less than 3 months) (due to fluid restriction)  Cold Intolerance: Yes - Chronic (Greater than 3 months)  Excessive Urination: None  Hotflashes: Yes- Chronic (Greater than 3 months) (rare)  Gastrointestinal  Gastrointestinal (WDL): Exceptions to WDL  Difficulty Swallowing: Yes - Chronic (Greater than 3 months)  Heartburn: None  Constipation: Yes - Chronic (Greater than 3 months)  Yellowish skin and/or eyes: None  Blood from rectum: None  Nausea and Vomiting: Yes - Chronic (Greater than 3 months) (Nausea)  Abdominal Pain: None  Diarrhea: None  Have had black or tan stools?: None  Hemorrhoids: Yes - Chronic (Greater than 3 months)  Poor Appetite: None (\"I'm dieting\")  Musculoskeletal  Musculoskeletal (WDL): Exceptions to WDL  Range of Motion Limitation: Yes - Chronic (Greater than 3 months)  Joint pain: Yes - Chronic (Greater than 3 months)  Back Pain: Yes - Chronic (Greater than 3 months)  Activity Assistance: None  Difficulty to lie flat for more than 30 minutes: Yes - Chronic (Greater than 3 months)  Pain interfering with walking: Yes - Chronic (Greater than 3 months)  Muscle pain or stiffness: Yes - Chronic (Greater than 3 months)  Recent fall: Yes - Chronic (Greater than 3 months)  Assistive device: None  Neurological  Neurological (WDL): Exceptions to WDL  History of LOC?: None  Headaches: Yes - Chronic (Greater than 3 months)  Difficulty walking: Yes - Chronic (Greater than 3 months) (due to pain in neck and back)  Difficulty with speech: None  Difficulty with memory: Yes - Chronic (Greater than 3 months)  Vertigo (Dizziness): Yes - Chronic (Greater than 3 months)  Dominant Hand: Right  Seizures: None  Difficulty with Balance: Yes - Chronic (Greater than 3 months)  Numbness and/or tingling: Yes - Chronic " "(Greater than 3 months)  Psychological/Emotional  Psychological/Emotional (WDL): Exceptions to WDL  Depression: Yes - Chronic (Greater than 3 months) (has bipolar)  Insomnia: Yes - Chronic (Greater than 3 months)  Panic attacks: Yes - Chronic (Greater than 3 months)  Anxiety: Yes - Chronic (Greater than 3 months)  Daytime sleepiness: Yes - Recent (Less than 3 months)  Hallucinations: None  Psychosocial needs or not coping well: None  Hematological/Lymphatic  Hematological/Lymphatic (WDL): Exceptions to WDL  Bleeding gums: Yes - Chronic (Greater than 3 months)  Bruising: None  Epistaxis: Yes - Recent (Less than 3 months)  Swollen glands: None  Dermatological  Dermatologic (WDL): Exceptions to WDL (dry skin-ezcema)  Open wounds: None  Rash : None  Hair Loss: Yes - Chronic (Greater than 3 months)  Healing Incision: None  Changes in moles: None  Significant sunburn: None  Genitourinary/Reproductive  Genitourinary/Reproductive (WDL): Exceptions to WDL  Urinary Frequency: None  Urinary Incontinence: None  Painful urination: Yes - Chronic (Greater than 3 months) (notices after drinks alcohol)  Urination more than 2 times a night: None  Urinary Urgency: None  Difficulty Initiating Urine Stream: None  Blood in urine: None  Sensation of incomplete emptying of bladder: None  Sexual concerns: None  Reproductive (Females only)  Menstrual irritation or increase in discharge: No  Age at start of periods: 13-14  Last menstural cycle: \"1st week in June\"  Number of pregnancies: 2  Age at first pregnancy: 16  Patient Pregnant?: No  Is the patient trying to get pregnant?: No  Is the patient on birth control: No  Pain  Currently in Pain: Yes  Pain Score (Initial OR Reassessment): 5  Pain Frequency: Constant/continuous  Location: HA-back of head  Pain Characteristics : Aching  Pain Intervention(s): Home medication    Physical Exam    Recent Vitals 7/6/2017   Height 5' 1\"   Weight 135 lbs 2 oz   BSA (m2) 1.62 m2   /54   Pulse 70 "   Temp -   Temp src -   SpO2 99     General: alert, awake, not in acute distress  HEENT: Head: Normal, normocephalic, atraumatic.  Eye: Normal external eye, conjunctiva, lids cornea, MARISOL.  Ears: Non-tender.  Nose: Normal external nose, mucus membranes and septum.  Pharynx: Dental Hygiene adequate. Normal buccal mucosa. Normal pharynx.  Neck / Thyroid: Supple, no masses, nodes, nodules or enlargement.  Lymphatics: No abnormally enlarged lymph nodes.  Chest: Normal chest wall and respirations. Clear to auscultation.  Heart: S1 S2 RRR, no murmur.   Abdomen: abdomen is soft without significant tenderness, masses, organomegaly or guarding  Extremities: normal strength, tone, and muscle mass  Skin: normal. no rash or abnormalities.  No petechiae nor bruises.  CNS: non focal.      Lab Results    Recent Results (from the past 168 hour(s))   HM1 (CBC with Diff)   Result Value Ref Range    WBC 3.0 (L) 4.0 - 11.0 thou/uL    RBC 3.66 (L) 3.80 - 5.40 mill/uL    Hemoglobin 11.3 (L) 12.0 - 16.0 g/dL    Hematocrit 30.8 (L) 35.0 - 47.0 %    MCV 84 80 - 100 fL    MCH 30.9 27.0 - 34.0 pg    MCHC 36.7 (H) 32.0 - 36.0 g/dL    RDW 12.6 11.0 - 14.5 %    Platelets 297 140 - 440 thou/uL    MPV 9.0 8.5 - 12.5 fL    Neutrophils % 55 50 - 70 %    Lymphocytes % 26 20 - 40 %    Monocytes % 13 (H) 2 - 10 %    Eosinophils % 5 0 - 6 %    Basophils % 2 0 - 2 %    Neutrophils Absolute 1.7 (L) 2.0 - 7.7 thou/uL    Lymphocytes Absolute 0.8 0.8 - 4.4 thou/uL    Monocytes Absolute 0.4 0.0 - 0.9 thou/uL    Eosinophils Absolute 0.2 0.0 - 0.4 thou/uL    Basophils Absolute 0.1 0.0 - 0.2 thou/uL   APTT(PTT)   Result Value Ref Range    PTT 43 (H) 24 - 37 seconds   Basic Metabolic Panel   Result Value Ref Range    Sodium 118 (LL) 136 - 145 mmol/L    Potassium 5.0 3.5 - 5.0 mmol/L    Chloride 87 (L) 98 - 107 mmol/L    CO2 25 22 - 31 mmol/L    Anion Gap, Calculation 6 5 - 18 mmol/L    Glucose 84 70 - 125 mg/dL    Calcium 8.6 8.5 - 10.5 mg/dL    BUN 7 (L) 8 - 22  mg/dL    Creatinine 0.61 0.60 - 1.10 mg/dL    GFR MDRD Af Amer >60 >60 mL/min/1.73m2    GFR MDRD Non Af Amer >60 >60 mL/min/1.73m2   Osmolality   Result Value Ref Range    Osmolality, Blood 258 (L) 270 - 300 mOsm/kg   Basic Metabolic Panel   Result Value Ref Range    Sodium 120 (L) 136 - 145 mmol/L    Potassium 3.9 3.5 - 5.0 mmol/L    Chloride 88 (L) 98 - 107 mmol/L    CO2 26 22 - 31 mmol/L    Anion Gap, Calculation 6 5 - 18 mmol/L    Glucose 71 70 - 125 mg/dL    Calcium 8.8 8.5 - 10.5 mg/dL    BUN 5 (L) 8 - 22 mg/dL    Creatinine 0.68 0.60 - 1.10 mg/dL    GFR MDRD Af Amer >60 >60 mL/min/1.73m2    GFR MDRD Non Af Amer >60 >60 mL/min/1.73m2   Thyroid Stimulating Hormone (TSH)   Result Value Ref Range    TSH 3.82 0.30 - 5.00 uIU/mL   Oxcarbazepine (Trileptal )   Result Value Ref Range    Oxcarbazepine Metabolite (MHC) 29 3 - 35 mcg/mL   Cortisol   Result Value Ref Range    Cortisol 5.6 ug/dL   Urinalysis   Result Value Ref Range    Color, UA Straw Colorless, Yellow, Straw, Light Yellow    Clarity, UA Clear Clear    Glucose, UA Negative Negative    Bilirubin, UA Negative Negative    Ketones, UA Negative Negative    Specific Gravity, UA 1.003 1.001 - 1.030    Blood, UA Negative Negative    pH, UA 7.0 4.5 - 8.0    Protein, UA Negative Negative mg/dL    Urobilinogen, UA <2.0 E.U./dL <2.0 E.U./dL, 2.0 E.U./dL    Nitrite, UA Negative Negative    Leukocytes, UA Negative Negative   Osmolality, Random, Urine   Result Value Ref Range    Osmolality, Urine 122 (L) 300 - 900 mOsm/kg   Sodium   Result Value Ref Range    Sodium 123 (L) 136 - 145 mmol/L   Osmolality   Result Value Ref Range    Osmolality, Blood 261 (L) 270 - 300 mOsm/kg   Sodium   Result Value Ref Range    Sodium 123 (L) 136 - 145 mmol/L   Osmolality   Result Value Ref Range    Osmolality, Blood 266 (L) 270 - 300 mOsm/kg   Basic Metabolic Panel   Result Value Ref Range    Sodium 123 (L) 136 - 145 mmol/L    Potassium 4.3 3.5 - 5.0 mmol/L    Chloride 94 (L) 98 - 107  mmol/L    CO2 22 22 - 31 mmol/L    Anion Gap, Calculation 7 5 - 18 mmol/L    Glucose 93 70 - 125 mg/dL    Calcium 8.5 8.5 - 10.5 mg/dL    BUN 6 (L) 8 - 22 mg/dL    Creatinine 0.57 (L) 0.60 - 1.10 mg/dL    GFR MDRD Af Amer >60 >60 mL/min/1.73m2    GFR MDRD Non Af Amer >60 >60 mL/min/1.73m2   Magnesium   Result Value Ref Range    Magnesium 2.2 1.8 - 2.6 mg/dL   Phosphorus   Result Value Ref Range    Phosphorus 3.8 2.5 - 4.5 mg/dL   Sodium   Result Value Ref Range    Sodium 127 (L) 136 - 145 mmol/L   Osmolality   Result Value Ref Range    Osmolality, Blood 269 (L) 270 - 300 mOsm/kg   Osmolality, Random, Urine   Result Value Ref Range    Osmolality, Urine 187 (L) 300 - 900 mOsm/kg   Sodium   Result Value Ref Range    Sodium 128 (L) 136 - 145 mmol/L   Osmolality   Result Value Ref Range    Osmolality, Blood 271 270 - 300 mOsm/kg   Sodium   Result Value Ref Range    Sodium 127 (L) 136 - 145 mmol/L   Sodium   Result Value Ref Range    Sodium 125 (L) 136 - 145 mmol/L   Phosphorus   Result Value Ref Range    Phosphorus 3.6 2.5 - 4.5 mg/dL   Potassium   Result Value Ref Range    Potassium 4.1 3.5 - 5.0 mmol/L   Creatinine   Result Value Ref Range    Creatinine 0.65 0.60 - 1.10 mg/dL    GFR MDRD Af Amer >60 >60 mL/min/1.73m2    GFR MDRD Non Af Amer >60 >60 mL/min/1.73m2   Magnesium   Result Value Ref Range    Magnesium 2.1 1.8 - 2.6 mg/dL   Sodium   Result Value Ref Range    Sodium 126 (L) 136 - 145 mmol/L   Sodium   Result Value Ref Range    Sodium 129 (L) 136 - 145 mmol/L   Sodium   Result Value Ref Range    Sodium 128 (L) 136 - 145 mmol/L   Potassium   Result Value Ref Range    Potassium 4.4 3.5 - 5.0 mmol/L   Phosphorus   Result Value Ref Range    Phosphorus 3.5 2.5 - 4.5 mg/dL   Creatinine   Result Value Ref Range    Creatinine 0.58 (L) 0.60 - 1.10 mg/dL    GFR MDRD Af Amer >60 >60 mL/min/1.73m2    GFR MDRD Non Af Amer >60 >60 mL/min/1.73m2   Magnesium   Result Value Ref Range    Magnesium 2.0 1.8 - 2.6 mg/dL   Sodium    Result Value Ref Range    Sodium 128 (L) 136 - 145 mmol/L   Osmolality, Random, Urine   Result Value Ref Range    Osmolality, Urine 750 300 - 900 mOsm/kg       Imaging Results    Xr Chest Pa And Lateral    Result Date: 6/22/2017  XR CHEST PA AND LATERAL 6/22/2017 11:40 AM INDICATION: Benign neoplasm of cerebral meninges COMPARISON: None. FINDINGS: Negative chest.    Us Neck Limited    Result Date: 6/22/2017   US NECK LIMITED 6/22/2017 12:09 PM INDICATION: swollen lymph node, right posterior chain. TECHNIQUE: Routine. COMPARISON: None. FINDINGS: Small, normal sized lymph node in the posterior right neck corresponds to palpable abnormality. The largest lymph node measures 6 mm in short axis. No mass or pathologically enlarged lymph nodes.    Mrv Head With Contrast    Result Date: 6/7/2017  MRV HEAD W CONTRAST 6/6/2017 7:43 PM INDICATION: eval extra-axial mass lesion consistent with a meningioma TECHNIQUE: Routine. IV CONTRAST: 5 ml gadavist COMPARISON: 05/24/2017 FINDINGS: The superior sagittal sinus is patent without hemodynamically significant stenosis. The confluence of the sinuses is patent without hemodynamically significant stenosis. Dominant transverse and sigmoid sinus. The right transverse and sigmoid sinus are patent as well as the proximal right internal jugular vein. The proximal non-dominant left transverse sinus is patent. However there is occlusion of the mid and distal left transverse sinus as well as the proximal left sigmoid sinus. There is minimal flow within the mid and distal left sigmoid sinus and the proximal left internal jugular vein. The large extra-axial mass in the left posterior fossa invades the left transverse transverse sinus as suggested on the brain MRI. The internal cerebral veins, the vein of Otilio and the straight sinus are patent without hemodynamically significant stenosis.     CONCLUSION: 1.  Occlusion of the mid and distal non-dominant left transverse sinus as well as the  proximal left sigmoid sinus. The large extra-axial mass in the left posterior fossa invades the left transverse transverse sinus as suggested on the brain MRI 05/24/2017. 2.  The rest of the major dural venous sinuses are patent without hemodynamically significant stenosis.        Signed by: Nico Good MD

## 2021-06-13 NOTE — ANESTHESIA PROCEDURE NOTES
Arterial Line  Reason for Procedure: hemodynamic monitoring and multiple ABGs  Patient location during procedure: Pre-op  Start time: 10/31/2017 7:17 AM  End time: 10/31/2017 7:23 AM  Staffing:  Performing  Anesthesiologist: MICHAEL ZIMMER  Sterile Precautions:  sterile barriers used during insertion: cap, mask, sterile gloves, large sheet, and hand hygiene used.  Arterial Line:   Immediately prior to procedure a time out was called to verify the correct patient, procedure, equipment, support staff and site/side marked as required  Laterality: left  Location: radial  Prepped with: ChloroPrep    Needle gauge: 20 G  Number of Attempts: 1  Secured with: tape, transparent dressing and pressure dressing  Flushed with: saline  1% lidocaine local anesthesia used for skin prep.   See MAR for additional medications given.

## 2021-06-13 NOTE — ANESTHESIA PREPROCEDURE EVALUATION
Anesthesia Evaluation      Patient summary reviewed   No history of anesthetic complications     Airway   Mallampati: II  Neck ROM: full   Pulmonary - normal exam   (+) asthma  moderate,  well controlled, a smoker                         Cardiovascular - normal exam  Exercise tolerance: > or = 4 METS  (+) hypertension, ,      Neuro/Psych    (+) depression, anxiety/panic attacks,     Endo/Other - negative ROS      GI/Hepatic/Renal    (+) GERD well controlled,   impaired hepatic function (history of Hep C, treated and cleared per patient.)     Other findings:  Lymphadenopathy    Acute Hepatitis, C Virus    Bipolar Disorder    Abnormal Pap Smear Of Cervix    Closed Separation Of The Acromioclavicular Joint    Hypertension    Allergies    Moderate persistent asthma without complication    Esophageal Reflux    Eczema    Asthma    Protrusion of cervical intervertebral disc    Anxiety    Insomnia    Hyponatremia    SIADH (syndrome of inappropriate ADH production)    Meningioma    Prolonged PTT          Dental    (+) chipped                       Anesthesia Plan  Planned anesthetic: general endotracheal  50 mg ketamine IV on induction.  Stress dose steroids.    ASA 3   Induction: intravenous   Anesthetic plan and risks discussed with: patient and parent/guardian  Anesthesia plan special considerations: antiemetics, arterial catheterization, IV therapy two IVs,   Post-op plan: routine recovery

## 2021-06-13 NOTE — ANESTHESIA POSTPROCEDURE EVALUATION
Patient: Amena JUAN Mastel  CRANIOTOMY AND RESECTION OF LEFT CEREBELLAR MENINGIOMA WITH MICROSCOPE & STEALTH  Anesthesia type: general    Patient location: PACU  Last vitals:   Vitals:    10/31/17 1530   BP: 122/58   Pulse: 88   Resp: 20   Temp:    SpO2: 97%     Post vital signs: stable  Level of consciousness: awake and responds to simple questions  Post-anesthesia pain: pain controlled  Post-anesthesia nausea and vomiting: no  Pulmonary: unassisted, return to baseline  Cardiovascular: stable and blood pressure at baseline  Hydration: adequate  Anesthetic events: no    QCDR Measures:  ASA# 11 - Stefany-op Cardiac Arrest: ASA11B - Patient did NOT experience unanticipated cardiac arrest  ASA# 12 - Stefany-op Mortality Rate: ASA12B - Patient did NOT die  ASA# 13 - PACU Re-Intubation Rate: ASA13B - Patient did NOT require a new airway mgmt  ASA# 10 - Composite Anes Safety: ASA10A - No serious adverse event    Additional Notes:

## 2021-06-13 NOTE — PROGRESS NOTES
Assessment & Plan:  1. Visit for screening mammogram  Mammo Screening Bilateral   2. Muscle spasms of neck  baclofen (LIORESAL) 10 MG tablet   3. Anemia  HM2(CBC w/o Differential)   4. Hyponatremia  Basic Metabolic Panel     Patient here today for follow up visit. Updated medication dosing and CSA due to increase in neck pain. Labs pending from nephrology to follow up on sodium, we will recheck today and let her know the result. Labs will be forwarded on to nephrology as well. Due for mammogram this year so order placed. Follow up on hemogram for low Hgb recently. Medication refills provided as needed today.    There are no Patient Instructions on file for this visit.    Orders Placed This Encounter   Procedures     Mammo Screening Bilateral     Standing Status:   Future     Standing Expiration Date:   12/19/2018     Order Specific Question:   Patient's previous breast density:     Answer:   Heterogeneously dense [3]     Order Specific Question:   Is the patient pregnant?     Answer:   No     Order Specific Question:   Can the procedure be changed per Radiologist protocol?     Answer:   Yes     HM2(CBC w/o Differential)     Medications Discontinued During This Encounter   Medication Reason     baclofen (LIORESAL) 10 MG tablet Reorder     traMADol (ULTRAM) 50 mg tablet Reorder           The following are part of a depression follow up plan for the patient:  under care of mental health counselor    Chief Complaint:   Chief Complaint   Patient presents with     Follow-up     management and labs       History of Present Illness:  Amena is a 46 y.o. female presenting to the clinic today for follow up on medications and lab work.  Patient has been having increased neck pain as of late, states it is getting worse.  She has had to put off her spinal surgery with Dr. Bergman due to ongoing issues with meningioma and trying to get her sodium under better control.  She has been working with nephrology and adjusting  medications to affect the hyponatremia.  She is on the medications to be quite difficult on her stomach and is hoping that lab work will be better today so she can get the surgery done sooner.  Patient currently taking Tramadol 2 tablets twice a day as well as baclofen 2 tablets twice a day.  We have tried to augment pain control for a short time with Percocet, however the Tylenol bother her stomach.  She is requesting an adjustment in her medications for pain slightly today so that she can have the option of 1 extra dose of each medication per day.  She is doubtful that she will use this all the time but with winter coming up, she tends to have exacerbations of pain with snow shoveling, cleaning her car off and other simple activities such as carrying groceries that previously had not been so bad.  She is hoping to get in with neurosurgery soon, and complete surgery so she can get back to Dr. Bergman and continue working on her spine.  There is an outstanding lab order from nephrology for BMP, she would like that drawn since she is here today.  She has reestablished care with psychiatry and has an upcoming appointment.    Review of Systems:  All other systems are negative except as noted above.    PFSH:  Reviewed and updated.     Tobacco Use:  History   Smoking Status     Former Smoker     Quit date: 12/25/2014   Smokeless Tobacco     Never Used     Comment: e-cig still       Vitals:  Vitals:    09/19/17 1438   BP: 124/88   Patient Site: Left Arm   Patient Position: Sitting   Cuff Size: Adult Regular   Resp: 14   Weight: 127 lb 9 oz (57.9 kg)     Wt Readings from Last 3 Encounters:   09/19/17 127 lb 9 oz (57.9 kg)   07/18/17 134 lb 8 oz (61 kg)   07/06/17 135 lb 1.6 oz (61.3 kg)       Physical Exam:  Constitutional:  Reveals an alert, cooperative, 46 year old female in no acute distress.  Vitals:  Per nursing notes.  Cardiovascular:  Regular rate and rhythm without murmurs, rubs, or gallops. Carotids without  bruits. Legs without edema.   Respiratory: Clear.  Respiratory effort normal.  Psychiatric:  Mood appropriate, memory intact.     Data Reviewed:  Additional History from Old Records or Another Person Summarized (2 total): None.     Decision to Obtain Extra information (1 total): None.     Radiology Tests Summarized and Ordered (XRAY/CT/MRI/DXA) (1 total): None.    Labs Reviewed and Ordered (1 total): bmp.    Medicine Tests Summarized and Ordered (EKG/ECHO/COLONOSCOPY/EGD) (1 total): None.    Independent Review of EKG or X-Ray (2 each): None.    The visit lasted a total of 25 minutes face to face with the patient. Over 50% of the time was spent counseling and educating the patient about POC.    Medications:  Current Outpatient Prescriptions   Medication Sig Dispense Refill     albuterol (PROVENTIL) 2.5 mg /3 mL (0.083 %) nebulizer solution INHALE 3 MILLILITERS (2.5 MG) BY NEBULIZATION ROUTE FOUR TIMES A DAY 75 mL 0     baclofen (LIORESAL) 10 MG tablet Two tablets in the morning(20 mg), one midday, and two tablets(20 mg) in the evening. 150 tablet 0     EPINEPHrine (EPIPEN 2-KYLE) 0.3 mg/0.3 mL (1:1,000) atIn Inject 0.3 mg into the shoulder, thigh, or buttocks once. Or as directed per MD       fluticasone (FLONASE) 50 mcg/actuation nasal spray SPRAY 1-2 SPRAYS INTO EACH NOSTRIL BY INTRANASAL ROUTE ONCE DAILY. 16 g 10     fluticasone-salmeterol (ADVAIR DISKUS) 250-50 mcg/dose DISKUS Inhale 1 puff 2 (two) times a day. 180 each 1     lisinopril (PRINIVIL,ZESTRIL) 10 MG tablet Take 1 tablet (10 mg total) by mouth daily. 30 tablet 11     omeprazole (PRILOSEC) 20 MG capsule Take 20 mg by mouth daily before breakfast.       OXcarbazepine (TRILEPTAL) 600 MG tablet Take 2 tablets (1,200 mg total) by mouth 2 (two) times a day. 360 tablet 1     PROAIR HFA 90 mcg/actuation inhaler Inhale 2 puffs by mouth every 4 to 6 hours as needed 17 g 0     QUEtiapine (SEROQUEL) 200 MG tablet Take 1 tablet (200 mg total) by mouth at bedtime as  needed. 90 tablet 0     traMADol (ULTRAM) 50 mg tablet TAKE 1-2 TABLETS BY ORAL ROUTE EVERY 4 HOURS AS NEEDED FOR PAIN 180 tablet 0     oxyCODONE-acetaminophen (PERCOCET) 5-325 mg per tablet Take 1 tablet by mouth every 4 (four) hours as needed for pain. 30 tablet 0     triamcinolone (KENALOG) 0.1 % cream Apply 1 application topically see administration instructions. Apply a thin layer to eczema patches twice daily for a max of 2 weeks.       No current facility-administered medications for this visit.        Total Data Points: 1    CLARISSA Malik, CNP    This note has been dictated using voice recognition software. Any grammatical or context distortions are unintentional and inherent to the software

## 2021-06-13 NOTE — PROGRESS NOTES
Assessment:     Amena Rosario was seen in preoperative consultation in preparation for CRANIOTOMY AND RESECTION OF LEFT CEREBELLAR MENINGIOMA WITH MICROSCOPE & STEALTH. This is an intermediate risk surgery and the patient has no increased risk for major cardiac complications based on exam and history and appears to be medically stable for the proposed procedure.     Cleared from my standpoint pending approval for surgery with Na at level of 132. Lab work sent to Neurosurgery on 10/18/17.     46 y.o. female with planned surgery as above.    Known risk factors for perioperative complications: None    Difficulty with intubation is not anticipated.    Cardiac Risk Estimation: low risk    Current medications which may produce withdrawal symptoms if withheld perioperatively: tramadol, oxycodone       Plan:      1. Preoperative workup as follows electrolytes, more preoperative lab work to be done at hospital preoperatively, CXR, EKG.  2. Change in medication regimen before surgery: none, continue medication regimen including morning of surgery, with sip of water.  3. Prophylaxis for cardiac events with perioperative beta-blockers: not indicated.  4. Invasive hemodynamic monitoring perioperatively: not indicated.  5. Deep vein thrombosis prophylaxis postoperatively:regimen to be chosen by surgical team.  6. Surveillance for postoperative MI with ECG immediately postoperatively and on postoperative days 1 and 2 AND troponin levels 24 hours postoperatively and on day 4 or hospital discharge (whichever comes first): not indicated.  7. Other measures: Preoperative lab work at surgeon discretion.      Subjective:      Amena Rosario is a 46 y.o. female who presents to the office today for a preoperative consultation at the request of surgeons Dr. Luna, Dr. Gant, and Dr. Zhou  who plans on performing CRANIOTOMY AND RESECTION OF LEFT CEREBELLAR MENINGIOMA WITH MICROSCOPE & STEALTH. on October 31. This consultation is  requested for the specific conditions prompting preoperative evaluation (i.e. because of potential affect on operative risk): hyponatremia. Planned anesthesia: general. The patient has the following known anesthesia issues: none. Patients bleeding risk: no recent abnormal bleeding and no remote history of abnormal bleeding. Patient does not have objections to receiving blood products if needed.    History of present illness: Amena is a 46-year-old female here today for preoperative consultation.  She is generally healthy with medical history that includes asthma, bipolar disorder, allergies.  She also has a history of some chronic neck pain due to cervical disc protrusion.  She had had a MRI of the C-spine done which showed a questionable area that should be evaluated further.  After this a MRI of the brain and carotids was done which showed a large left posterior fossa meningioma.  She saw neurosurgery after this was found and it was decided to pursue removal of this lesion due to the size.  There is also some obstruction in the sinus related to this lesion and given her age they decided to pursue removal.  There will be several surgeons participating in the surgery.  Other than some anxiety regarding the upcoming procedure she is doing well.  Neurological exam is intact and has been throughout.  Initial surgery was scheduled in June, however given a low sodium level likely caused by her medications in the tumor itself surgery had to be postponed until sodium level could be under better control again.  At last check it was at 138, we will wait for lab results today and patient will have more preoperative lab results done prior to the procedure.  The morning patient is ready to proceed with surgery, as she would like to get her chronic neck pain taking care of as a next step since this has worsened recently.  Currently Amena has bipolar disorder is under good control with medications.  Blood pressure is stable  today.  We will check labs as requested by surgeon and pregnancy test leading into surgery.  She has had no recent infections, fevers or chills.  No recent palpitations, chest pain or shortness of breath.  She is tolerated previous procedures with anesthesia well without side effect.  No bleeding problems with previous surgeries or procedures.    The following portions of the patient's history were reviewed and updated as appropriate: allergies, current medications, past family history, past medical history, past social history, past surgical history and problem list.    Review of Systems  Constitutional: negative  Eyes: negative  Ears, nose, mouth, throat, and face: positive for nasal congestion  Respiratory: negative  Cardiovascular: negative  Gastrointestinal: negative  Genitourinary:negative  Integument/breast: negative  Hematologic/lymphatic: negative  Musculoskeletal:positive for myalgias and neck pain  Neurological: positive for headaches and nerve pain related to cervical issues  Behavioral/Psych: positive for anxiety  Endocrine: negative  Allergic/Immunologic: negative      Objective:     /78  Pulse 64  Temp 98.3  F (36.8  C) (Oral)   Resp 12  Wt 138 lb (62.6 kg)  LMP 09/24/2017  BMI 26.07 kg/m2  General: Patient appears to be in no acute distress.  Eyes:PERRLA  Ears: No nodules, lesions, masses, discharge or tenderness in auricles or mastoid area. No cerumen in ear canals. Tympanic membranes pearly gray, normal bony landmarks and cone of light bilaterally, no bulges or perforations.   Oropharynx: Buccal mucosa pink, moist, no lesions. Tongue midline, spongy, pink. Uvula midline. Pharynx with no erythema, no exudates. Tonsils + 1.  Neck: Full range of motion, no pain with palpation of spine or paraspinal muscles.  Lungs: Unlabored. clear breath sounds heard throughout lung fields.   Heart: Regular rate and rhythm.  Abdomen: Soft rounded abdomen.Bowel sounds heard in all four quadrants; liver,  spleen, and kidney not palpable, no tenderness on palpation of abdomen, no CVA tenderness.    Musculoskeletal: muscles appear symmetric, spine and extremities in good alignment.  Neuro: Coordinated, smooth gait, balance, rapid alternating movements, sensory functioning, and cranial nerves II-XII grossly intact. DTR's+2 bilaterally brachioradialis, knee, ankle. Rhomberg s WNL.        Predictors of intubation difficulty:   Morbid obesity? no   Anatomically abnormal facies? no   Prominent incisors? no   Receding mandible? no   Short, thick neck? no   Neck range of motion: normal   Mallampati score: I (soft palate, uvula, fauces, and tonsillar pillars visible)   Dentition: No chipped, loose, or missing teeth.    Cardiographics  ECG: normal sinus rhythm, no blocks or conduction defects, no ischemic changes  Echocardiogram: not done    Imaging  Chest x-ray: normal     Lab Review   Lab Results   Component Value Date     (L) 10/17/2017    K 3.8 10/17/2017    CL 98 10/17/2017    CO2 25 10/17/2017    BUN 9 10/17/2017    CREATININE 0.68 10/17/2017    CALCIUM 9.3 10/17/2017     Lab Results   Component Value Date    WBC 3.6 (L) 09/19/2017    HGB 12.1 09/19/2017    HCT 36.6 09/19/2017    MCV 88 09/19/2017     09/19/2017      Daniella Edmond CNP    This note has been dictated using voice recognition software. Any grammatical or context distortions are unintentional and inherent to the software

## 2021-06-13 NOTE — PROCEDURES
[facility] Radiology Post Procedure    Procedure: Cerebral angiogram    Radiologist: Aurelio Darden    Contrast: 150 mL omnipaque  Fluoro time: 10.1 minutes  Fluoro dose: 2258 mGy    Medications: Versed 2 mg IV                        Fentanyl 100 mcg IV  Sedation Time: 30 minutes    EBL: minimal  Complications: none    Preliminary findings: (see dictation for full detail)  - Posterior fossa presumed meningioma with mild vascularity arising from intradural vertebral artery meningeal branches. No target amenable to embolization.  - Small channel of patent flow through the site of tumoral invasion between the transverse and sigmoid sinuses on posterior circulation angiography.    Assess/Plan:   Report to Dr Jeremy Zhang for 4 hours then discharge home  Return tomorrow morning for surgery with Dr Jeremy Darden

## 2021-06-13 NOTE — ANESTHESIA CARE TRANSFER NOTE
Last vitals:   Vitals:    10/31/17 1418   BP: 111/60   Pulse: 90   Resp: 16   Temp: 37.3  C (99.2  F)   SpO2: 100%     Patient's level of consciousness is drowsy  Spontaneous respirations: yes  Maintains airway independently: yes  Dentition unchanged: yes  Oropharynx: oral airway in place    QCDR Measures:  ASA# 20 - Surgical Safety Checklist: WHO surgical safety checklist completed prior to induction  PQRS# 430 - Adult PONV Prevention: 4558F - Pt received => 2 anti-emetic agents (different classes) preop & intraop  ASA# 8 - Peds PONV Prevention: NA - Not pediatric patient, not GA or 2 or more risk factors NOT present  PQRS# 424 - Stefany-op Temp Management: 4559F - At least one body temp DOCUMENTED => 35.5C or 95.9F within required timeframe  PQRS# 426 - PACU Transfer Protocol: - Transfer of care checklist used  ASA# 14 - Acute Post-op Pain: ASA14B - Patient did NOT experience pain >= 7 out of 10

## 2021-06-13 NOTE — PROGRESS NOTES
Preop Assessment: Amena Rosario presents for pre-op review.  Surgeon: Dr. Gant, Dr. Luna, Dr. Zhou  Name of Surgery: CRANIOTOMY AND RESECTION OF LEFT CEREBELLAR MENINGIOMA WITH MICROSCOPE & STEALTH  Pre op Angio/embolization and pre op venogram and MRI on 10-30-17.  Diagnosis: Cerebellar meningioma  Date of Surgery: 10-31-17  Time of Surgery: 0730  Hospital: [facility]  H&P: 4-17-17 Daniella Edmond NP, cleared pt for surgery  History of ASA, NSAIDS, vitamin and/or herbal supplements within 10 days: No  History of blood thinners: No  History of anti-seizure med's: No. On Trileptal for bipolar  Review of systems: feels well today, Eczema on hands, has dry scabs, no scalp lesions/rash    Diagnostics:  Labs: 10-23-17 sodium stable at 135, PTT 39 (hematology clearance obtained)   CXR: 10-17-17: negative  EKG: 10-17-17 NSR  Films: MRI head 5-24-17 in PACS  Pre op MRI with fiducals/stealth protocol scheduled    All questions answered regarding surgery and expected pre and postoperative course including rehabilitation phase.     Reviewed with patient: Arrive 2.5  hours prior to scheduled surgery, nothing to eat or drink after midnight the night before surgery and bring all pertinent films to the hospital the day of surgery.  Continue to refrain from NSAIDS (Ibuprofen, Aleve, Naprosyn) ASA or over the counter herbal medication or supplements, anticoagulants and blood thinners.    Preop skin preparations and instructions provided.    Marley Patino RN

## 2021-06-13 NOTE — PROGRESS NOTES
"Interventional Neuroradiology-Pre Sedation assessment    46 year old female presents today for cerebral angiogram with possible embolization    HPI: Initially presented with headaches and loss of balance.  Non-invasive imaging suggests a large left posterior fossa meningioma.  Surgical resection is planned for tomorrow.  We have been asked to perform cerebral angiogram with possible embolization if amenable    History and physical reviewed with no changes    Allergies   Allergen Reactions     Aspirin (Tartrazine Only) Nausea Only     Morphine (Pf) Other (See Comments)     Throat tightness     Propoxyphene N-Acetaminophen Nausea Only     Carisoprodol Rash     Labs: Hgb 11.5, Plt 373, creat 0.67, UPT negative    Exam:   /86 (Patient Position: Sitting)  Pulse 63  Temp 97.8  F (36.6  C) (Oral)   Resp 16  Ht 5' 1\" (1.549 m)  Wt 134 lb 4 oz (60.9 kg)  LMP 10/28/2017 Comment: upt sent 10/30  SpO2 98%  BMI 25.37 kg/m2  Gen: sitting up on cart, NAD except some chronic neck pain  Neuro: A/O x4, speech clear and fluent. FISHER strength equal  Cardiac: S1S2  Lungs: clear  Mallampati: 2    Impression: Okay to proceed with planned procedure using moderate IV sedation    Plan: Cerebral angiogram with possible embolization procedure its risks, benefits, and alternatives including but not limited to stroke, bleeding, renal failure, contrast dye or medication reaction, infection, vessel injury were discussed with patient and her parents.  Questions answered and patient gives consent to proceed.    Yara Basilio, APRN, CNP      "

## 2021-06-14 NOTE — PROGRESS NOTES
"CHART NOTE     DATE OF SERVICE:  2017     : 1970   47 y.o.     ASSESSMENT :   Overall doing well and symptoms improving.       PLAN: Loosen restrictions. MRI brain in 6 months. If stable, annually thereafter.     HPI:  Amena Rosario is status post CRANIOTOMY AND RESECTION OF LEFT CEREBELLAR MENINGIOMA WITH MICROSCOPE & STEALTH on 10-31-17 by Dr. Luna and Dr. Zhou. MENINGIOMA, FIBROUS TYPE (W.H.O. GRADE I).  PLAN: Presented at Tumor Conf on 10/13/2017. Consensus was to get the baseline MRI at about 3 weeks post op. If stable, will get next scan in 6 months, then every year thereafter.  Patient initially presented to Ortho and in was in process of workup for neck issues, lesion found incidentally on cervical MRI.      TODAY, Amena Rosario reports occasional HA, mostly relieved with rest and medication.  Takes Tramadol daily mainly for her neck and issues.  No N/V. Occasional dizziness but also being followed by Nephrology for low sodium.  Next visit this coming Thursday.  Feels L ear fullness and still some residual numbness.  Incision has been itching, she has been \"working\" at the [facility].      PAST MEDICAL HISTORY, SURGICAL HISTORY, REVIEW OF SYMPTOMS, MEDICATIONS AND ALLERGIES:  Past medical history, surgical history, ROS, medications and allergies reviewed with patient and remain unchanged from previous visit.    Past Medical History:   Diagnosis Date     Abnormal Pap Smear Of Cervix     Created by [employer] Annotation: 2008  3:33PM - Elba Campos: 01, '00--LGSIL      Acute Hepatitis, C Virus     Created by Conversion  Replacement Utility updated for latest IMO load     Allergies     Created by [employer] [facility] Annotation: 2008  3:33PM - Elba Campos: seasonalpets      Anxiety      Asthma      Bipolar Disorder     Created by Conversion  Replacement Utility updated for latest IMO load     Breast cyst since     right     Closed Separation Of The " "Acromioclavicular Joint     Created by Conversion  Replacement Utility updated for latest IMO load     Eczema 1/22/2016     Esophageal reflux     Created by Conversion      HPV (human papilloma virus) infection      Hypertension     Created by Conversion      Hyponatremia      Insomnia      Lymphadenopathy     Created by Conversion [facility] Annotation: Jul 2 2012  9:46AM - Elba Campos: R posterior  cervical lymph node  Replacement Utility updated for latest IMO load     Meningioma 7/1/2017     Meningioma of cerebellum     left cerebellar     Moderate persistent asthma without complication     Created by Conversion      Protrusion of cervical intervertebral disc 5/15/2017     SIADH (syndrome of inappropriate ADH production) 7/1/2017     PHYSICAL EXAM:    LMP 11/27/2017 (Approximate)   /68  Pulse 77  Temp 97.9  F (36.6  C)  Ht 5' 1\" (1.549 m)  Wt 150 lb (68 kg)  LMP 11/27/2017 (Approximate)  SpO2 99%  BMI 28.34 kg/m2    Neurological exam reveals:  Respirations easy, non-labored.   Skin: W/D/I. No rashes, lesions or breaks in integrity.   Recent and remote memory intact, fund of knowledge wnl.    Alert and oriented x3, speech fluent and appropriate.   Comprehension intact with 2 step crossover command.   Finger nose finger smooth and accurate  PERRL, EOMI, No nystagmus,   Face symmetric, tongue midline, Uvula midline,  palate rises with phonation   Shoulder shrug equal  FISHER w/ good strength  No extremity edema noted.   Can heel/toe walk, do toe rises and squats without difficulty.   Muscle Bulk and tone wnl.   Reflexes: No pathological reflexes   Gait and station:Normal, can tandem  Incision: CDI without erythema or edema, some scabbing noted.     RADIOGRAPHIC IMAGING: MRI brain 11/24/2017: Per report: Dural thickening and enhancement at the operative site, (slightly thickened inferiorly) as well as a small amount leptomeningeal enhancement is presumed postoperative in nature although continued " follow-up is advised.    Films personally reviewed and interpreted.  SPR also reviewed and discussed with Dr. Gant..   Reviewed imaging with patient and family.

## 2021-06-14 NOTE — PROGRESS NOTES
ASSESSMENT:  1. Hyponatremia  Sodium   2. Nasal congestion         PLAN:  Recheck sodium today, follow for results.  Will forward levels to nephrology for further management.  Currently patient still taking medications to affect sodium and will have to see how close to follow-up as needed going forward and when she can wean off of these medications if at all.  Patient having some nasal congestion, questioning sinus infection.  Exam not consistent with sinus infection today, encourage continued monitoring of symptoms and to let us know if sinus pressure worsens.  No problem-specific Assessment & Plan notes found for this encounter.      There are no Patient Instructions on file for this visit.    No orders of the defined types were placed in this encounter.    There are no discontinued medications.    No Follow-up on file.    CHIEF COMPLAINT:  Chief Complaint   Patient presents with     Follow-up     recheck sodium     Ear Drainage     Facial Pain     Edema     Hypertension     discuss restarting amlodipine       HISTORY OF PRESENT ILLNESS:  Amena is a 47 y.o. female today to discuss multiple issues.  Patient would like to follow-up on her sodium levels as they have been low throughout her preoperative and postoperative period.  She is approximately 3-4 weeks out from surgery for removal of meningioma.  She is doing well, continues to have some pain although that is improving, she has some ear fullness and fluid behind the eardrum she believes in the left ear.  She would like that checked out today.  Over the past week she has had a little bit of worsening facial pain and pressure that she relates to sinus drainage, also having green nasal drainage at times.  She would like to be checked over make sure she does not have a sinus infection as she would like to avoid antibiotics if possible.  Lastly she is mentioning some ongoing lower extremity edema.  It is not persistent but will fluctuate depending on her activity  level and rest throughout the day.  She was previously taken off amlodipine due to thoughts that it was contributing to edema, however this has not improved things.  She has had ongoing issues with sodium as previously mentioned and we discussed how this could contribute to swelling.  Overall, with elevation of her legs and less activity she will see improvement and swelling level fluctuates more as of late than it has in the past when she was near to her surgery.    REVIEW OF SYSTEMS:      Pertinent items are noted in HPI.  All other systems are negative  PFSH:  Reviewed, no changes      TOBACCO USE:  History   Smoking Status     Former Smoker     Quit date: 12/25/2014   Smokeless Tobacco     Never Used       VITALS:  Vitals:    11/29/17 0952   BP: 136/80   Patient Site: Left Arm   Patient Position: Sitting   Cuff Size: Adult Regular   Pulse: 68   Resp: 14   Temp: 98.4  F (36.9  C)   TempSrc: Oral   Weight: 150 lb 7 oz (68.2 kg)     Wt Readings from Last 3 Encounters:   11/29/17 150 lb 7 oz (68.2 kg)   11/09/17 148 lb (67.1 kg)   11/02/17 139 lb (63 kg)       PHYSICAL EXAM:   /80 (Patient Site: Left Arm, Patient Position: Sitting, Cuff Size: Adult Regular)  Pulse 68  Temp 98.4  F (36.9  C) (Oral)   Resp 14  Wt 150 lb 7 oz (68.2 kg)  LMP 11/27/2017 (Approximate)  Breastfeeding? No  BMI 28.42 kg/m2  General appearance: alert, appears stated age and cooperative  Head: Normocephalic, without obvious abnormality, atraumatic  Eyes: conjunctivae/corneas clear. PERRL, EOM's intact. Fundi benign.  Ears: normal TM's and external ear canals both ears  Nose: Nares normal. Septum midline. Mucosa normal. No drainage or sinus tenderness.  Throat: lips, mucosa, and tongue normal; teeth and gums normal  Neck: no carotid bruit, no JVD, supple, symmetrical, trachea midline, thyroid not enlarged, symmetric, no tenderness/mass/nodules and Moderate left-sided posterior cervical lymph node swelling, this area is near to her  previous incision from surgery.  Lungs: clear to auscultation bilaterally  Heart: regular rate and rhythm, S1, S2 normal, no murmur, click, rub or gallop  Neurologic: Grossly normal  Psychologic: Mood and affect normal.      DATA REVIEWED:  Additional History from Old Records Summarized (2): 0  Decision to Obtain Records (1): 0  Radiology Tests Summarized or Ordered (1): 0  Labs Reviewed or Ordered (1): 1  Medicine Test Summarized or Ordered (1): 0  Independent Review of EKG or X-RAY(2 each): 0    The visit lasted a total of 25 minutes face to face with the patient. Over 50% of the time was spent counseling and educating the patient about plan of care.    MEDICATIONS:  Current Outpatient Prescriptions   Medication Sig Dispense Refill     demeclocycline (DECLOMYCIN) 300 MG tablet Take 300 mg by mouth 2 (two) times a day.       ADVAIR DISKUS 250-50 mcg/dose DISKUS INHALE ONE PUFF BY MOUTH TWICE DAILY 180 each 3     albuterol (PROVENTIL) 2.5 mg /3 mL (0.083 %) nebulizer solution INHALE 3 MILLILITERS (2.5 MG) BY NEBULIZATION ROUTE FOUR TIMES A DAY (Patient taking differently: Take 2.5 mg by nebulization every 6 (six) hours as needed. INHALE 3 MILLILITERS (2.5 MG) BY NEBULIZATION ROUTE FOUR TIMES A DAY) 75 mL 0     baclofen (LIORESAL) 10 MG tablet TAKE 2 TABLETS BY MOUTH IN THE MORNING, ONE AT MIDDAY, AND 2 TABLETS IN THE EVENING. 150 tablet 0     docusate sodium (COLACE) 100 MG capsule Take 100 mg by mouth 2 (two) times a day.       EPINEPHrine (EPIPEN 2-KYLE) 0.3 mg/0.3 mL (1:1,000) atIn Inject 0.3 mg into the shoulder, thigh, or buttocks once. Or as directed per MD       fluticasone (FLONASE) 50 mcg/actuation nasal spray SPRAY 1-2 SPRAYS INTO EACH NOSTRIL BY INTRANASAL ROUTE ONCE DAILY. 16 g 10     LORazepam (ATIVAN) 1 MG tablet Take 1 mg by mouth 3 (three) times a day as needed for anxiety.       omeprazole (PRILOSEC) 20 MG capsule Take 20 mg by mouth daily before breakfast.       OXcarbazepine (TRILEPTAL) 600 MG  tablet Take 2 tablets (1,200 mg total) by mouth 2 (two) times a day. 360 tablet 1     oxyCODONE (ROXICODONE) 5 MG immediate release tablet 1 tab for post surgical pain 5-7 and 2 tabs for post surgical pain 8-10 every 4 hours as needed. 30 tablet 0     PROAIR HFA 90 mcg/actuation inhaler Inhale 2 puffs by mouth every 4 to 6 hours as needed 17 g 0     QUEtiapine (SEROQUEL) 200 MG tablet Take 1 tablet (200 mg total) by mouth at bedtime as needed. (Patient taking differently: Take 100-200 mg by mouth at bedtime as needed. ) 90 tablet 0     sodium chloride 1 gram tablet 1 g 2 (two) times a day.   1     traMADol (ULTRAM) 50 mg tablet TAKE 1-2 TABLETS BY ORAL ROUTE EVERY 4 HOURS AS NEEDED FOR PAIN 180 tablet 0     triamcinolone (KENALOG) 0.1 % cream Apply 1 application topically see administration instructions. Apply a thin layer to eczema patches twice daily for a max of 2 weeks.       No current facility-administered medications for this visit.        This note has been dictated using voice recognition software. Any grammatical or context distortions are unintentional and inherent to the software

## 2021-06-14 NOTE — PROGRESS NOTES
Pt is here for a wound check s/p CRANIOTOMY AND RESECTION OF LEFT CEREBELLAR MENINGIOMA WITH MICROSCOPE & STEALTH on 10-31-17 by Stalin Chamorro and Winnie.   She was discharged home with her parents on 11-2-17. Her surgery was delayed due to hyponatremia.   Final path= Meningioma I  Today she reports incisional discomfort and neck pain from the incision.  She is taking oxycodone and getting adequate relief.  She is off steroids and feels her mood is pretty much back to baseline.   She reports swelling in feet and legs that started after her appt with Daniella Edmond yesterday.  Her labs were drawn and Na is 129.  She remains on salt tabs.    Instructed her to call Daniella Edmond, NP, today to discuss sodium and leg swelling.     Surgical wound WNL, dermabond in place over nylon sutures.  - CDI, no signs of infection or skin breakdown.  Incision healing well: good skin approximation, no redness or visible/palpable edema, no tenderness to palpation.  PT. AF, denies fever, chills or sweats.    She will return in 10 days for suture removal.    Marley Patino RN

## 2021-06-14 NOTE — TELEPHONE ENCOUNTER
RN cannot approve Refill Request    RN can NOT refill this medication med is not covered by policy/route to provider. Last office visit: 5/21/2019 Daniella Edmond FNP Last Physical: 10/17/2017 Last MTM visit: Visit date not found Last visit same specialty: 5/21/2019 Daniella Edmond FNP.  Next visit within 3 mo: Visit date not found  Next physical within 3 mo: Visit date not found      Kenzie Coronel, [facility] Triage/Med Refill 1/6/2021    Requested Prescriptions   Pending Prescriptions Disp Refills     ondansetron (ZOFRAN-ODT) 4 MG disintegrating tablet [Pharmacy Med Name: Ondansetron Oral Tablet Disintegrating 4 MG] 30 tablet 0     Sig: Take 1 tablet (4 mg) by mouth every 8 hours as needed for nausea.       There is no refill protocol information for this order

## 2021-06-14 NOTE — PROGRESS NOTES
ASSESSMENT:  1. Meningioma  Surgery to remove meningioma went well, patient following expected postoperative course.  Requesting one refill of pain medication today, this was provided.  Will update lab work to make sure returning towards normal.  We will need to continue to follow her sodium level given her history of hyponatremia.  She does have follow-up scheduled for a wound check with neurosurgery next week.  So far wound healing looks appropriate, will defer to them on wound care and any medication changes other than pain control at this time.  - oxyCODONE (ROXICODONE) 5 MG immediate release tablet; 1 tab for post surgical pain 5-7 and 2 tabs for post surgical pain 8-10 every 4 hours as needed.  Dispense: 30 tablet; Refill: 0  - Phosphorus  - Magnesium  - Basic Metabolic Panel    2. Brain compression  - oxyCODONE (ROXICODONE) 5 MG immediate release tablet; 1 tab for post surgical pain 5-7 and 2 tabs for post surgical pain 8-10 every 4 hours as needed.  Dispense: 30 tablet; Refill: 0  - Phosphorus  - Magnesium  - Basic Metabolic Panel      I have reconciled all of the medications.     PLAN:  There are no Patient Instructions on file for this visit.    Orders Placed This Encounter   Procedures     Phosphorus     Magnesium     Basic Metabolic Panel     Medications Discontinued During This Encounter   Medication Reason     oxyCODONE (ROXICODONE) 5 MG immediate release tablet Reorder       No Follow-up on file.    CHIEF COMPLAINT:  Chief Complaint   Patient presents with     Follow-up     neuro surgery. pt states she is experiencing some pain and pressure. also has been experiencing alot of night sweats       HISTORY OF PRESENT ILLNESS:  Amena is a 46 y.o. female presenting to the clinic today for a hospital follow up. she was admitted to the hospital on 10/31/2017 for craniotomy and resection of left cerebellar meningioma with microscope and stealth. she was contacted after discharge on 11/2/17 and was advised  to follow up with her PCP 14 days post discharge.  Patient did well postoperatively and was discharged after 3 days in the hospital.  Her sodium stayed stable during her hospital stay even though she had dealt with hyponatremia prior to tumor resection.  Pain has been fairly well fairly well-controlled, in addition to her chronic pain medications for neck pain she has been taking oxycodone 5 mg tablets approximately every 4-6 hours as needed. Pain has improved overall since surgery.  She does feel slightly constipated and so I encouraged her to continue to use Colace and MiraLAX as needed to keep stools soft.  She is eating a normal diet at this point.  She is staying with her parents who are helping take care of her, she does state she has had some ongoing headache which is expected per neurosurgery, but she wonders if she may be doing too much activity that is contributing to headache.  Chronic neck pain has been doing well overall since surgery.  We did reconcile medications during the time of visit.  Will update labs today to make sure they are returning to normal continuing to be stable.    REVIEW OF SYSTEMS:   All other systems are negative.    PFS:  Reviewed and updated.     TOBACCO USE:  History   Smoking Status     Former Smoker     Quit date: 12/25/2014   Smokeless Tobacco     Never Used       VITALS:  Vitals:    11/09/17 1408   BP: 124/68   Patient Site: Right Arm   Patient Position: Sitting   Cuff Size: Adult Regular   Pulse: 74   Resp: 16   Temp: 98.4  F (36.9  C)   TempSrc: Oral   Weight: 148 lb (67.1 kg)     Wt Readings from Last 3 Encounters:   11/09/17 148 lb (67.1 kg)   11/02/17 139 lb (63 kg)   10/30/17 134 lb 4 oz (60.9 kg)     Body mass index is 27.96 kg/(m^2).      PHYSICAL EXAM:  Constitutional:  Reveals alert, cooperative 46 year old female.  Vitals:  Per nursing notes.  Ears:  Clear.  Oropharynx:  Without posterior nasal drainage or thrush.  Neck:  Supple, thyroid not palpable.  Cardiac:   Regular rate and rhythm without murmurs, rubs, or gallops. Carotids without bruits. Legs without edema.   Lungs: Clear.  Respiratory effort normal.  Abdomen:   Bowel sounds positive, nontender, nondistended.  Neither liver nor spleen palpable.  Skin:   Incision on the left scalp posterior to the ear, closed with sutures and dermabond, clean dry intact, normal healing.   Lymph: some lymph node swelling in the anterior and posterior cervical regions.  Rheumatologic: Normal joints and nails of the hands.  Neurologic:  Cranial nerves II-XII intact.     Psychiatric:  Mood appropriate, memory intact.       DATA REVIEWED:  Additional History from Old Records or Another Person Summarized (2 total): hospital chart reviewed.     Decision to Obtain Extra information (1 total): None.     Radiology Tests Summarized and Ordered (XRAY/CT/MRI/DXA) (1 total): None.    Labs Reviewed and Ordered (1 total): see above    Medicine Tests Summarized and Ordered (EKG/ECHO/COLONOSCOPY/EGD) (1 total): None.    Independent Review of EKG or X-Ray (2 each): None.    The visit lasted a total of 40 minutes face to face with the patient. Over 50% of the time was spent counseling and educating the patient about plan of care.    MEDICATIONS:  Current Outpatient Prescriptions   Medication Sig Dispense Refill     ADVAIR DISKUS 250-50 mcg/dose DISKUS INHALE ONE PUFF BY MOUTH TWICE DAILY 180 each 3     albuterol (PROVENTIL) 2.5 mg /3 mL (0.083 %) nebulizer solution INHALE 3 MILLILITERS (2.5 MG) BY NEBULIZATION ROUTE FOUR TIMES A DAY (Patient taking differently: Take 2.5 mg by nebulization every 6 (six) hours as needed. INHALE 3 MILLILITERS (2.5 MG) BY NEBULIZATION ROUTE FOUR TIMES A DAY) 75 mL 0     amLODIPine (NORVASC) 5 MG tablet Take 1 tablet (5 mg total) by mouth daily. 30 tablet 1     baclofen (LIORESAL) 10 MG tablet TAKE 2 TABLETS BY MOUTH IN THE MORNING, ONE AT MIDDAY, AND 2 TABLETS IN THE EVENING. 150 tablet 0     EPINEPHrine (EPIPEN 2-KYLE) 0.3  mg/0.3 mL (1:1,000) atIn Inject 0.3 mg into the shoulder, thigh, or buttocks once. Or as directed per MD       fluticasone (FLONASE) 50 mcg/actuation nasal spray SPRAY 1-2 SPRAYS INTO EACH NOSTRIL BY INTRANASAL ROUTE ONCE DAILY. 16 g 10     LORazepam (ATIVAN) 1 MG tablet Take 1 mg by mouth 3 (three) times a day as needed for anxiety.       omeprazole (PRILOSEC) 20 MG capsule Take 20 mg by mouth daily before breakfast.       OXcarbazepine (TRILEPTAL) 600 MG tablet Take 2 tablets (1,200 mg total) by mouth 2 (two) times a day. 360 tablet 1     oxyCODONE (ROXICODONE) 5 MG immediate release tablet 1 tab for post surgical pain 5-7 and 2 tabs for post surgical pain 8-10 every 4 hours as needed. 30 tablet 0     PROAIR HFA 90 mcg/actuation inhaler Inhale 2 puffs by mouth every 4 to 6 hours as needed 17 g 0     QUEtiapine (SEROQUEL) 200 MG tablet Take 1 tablet (200 mg total) by mouth at bedtime as needed. (Patient taking differently: Take 100-200 mg by mouth at bedtime as needed. ) 90 tablet 0     sodium chloride 1 gram tablet 1 g 2 (two) times a day.   1     traMADol (ULTRAM) 50 mg tablet TAKE 1-2 TABLETS BY ORAL ROUTE EVERY 4 HOURS AS NEEDED FOR PAIN 180 tablet 0     triamcinolone (KENALOG) 0.1 % cream Apply 1 application topically see administration instructions. Apply a thin layer to eczema patches twice daily for a max of 2 weeks.       docusate sodium (COLACE) 100 MG capsule Take 100 mg by mouth 2 (two) times a day.       No current facility-administered medications for this visit.        Total data points:3

## 2021-06-14 NOTE — PROGRESS NOTES
Pt is here for suture removal s/p CRANIOTOMY AND RESECTION OF LEFT CEREBELLAR MENINGIOMA WITH MICROSCOPE & STEALTH on 10-31-17 by Dr. Luna and Dr. Zhou.    She complains of neck pain from incision and from previous cervical issues that need surgery.   She was given refill of oxycodone from her PCP. She called last week with bilateral leg swelling.  US was negative for DVT.  She went back to nephrologist and med adjustments were made.  She will f/u with PCP in 2 weeks for sodium level.      Surgical wound WNL with dermabond over sutures.  - CDI, no signs of infection or skin breakdown.  Incision well-healed: good skin approximation, no redness or visible/palpable edema, no tenderness to palpation.  PT. AF, denies fever, chills or sweats.     Suture removal - sutures intact removed without difficulty. Wound prepped with Betadine before and after removal.  Surrounding skin has no signs of breakdown.  Verbal instructions regarding incision care are given.  Pt. advised to call us if any s/s of infection noted - all discussed in details.    Marley Patino RN

## 2021-06-16 NOTE — TELEPHONE ENCOUNTER
RN cannot approve Refill Request    RN can NOT refill this medication med is not covered by policy/route to provider. Last office visit: 5/21/2019 Daniella Edmond FNP Last Physical: 10/17/2017 Last MTM visit: Visit date not found Last visit same specialty: 5/21/2019 Daniella Edmond FNP.  Next visit within 3 mo: Visit date not found  Next physical within 3 mo: Visit date not found      Vonda Rivera, [facility] Triage/Med Refill 3/23/2021    Requested Prescriptions   Pending Prescriptions Disp Refills     fluconazole (DIFLUCAN) 150 MG tablet [Pharmacy Med Name: Fluconazole Oral Tablet 150 MG] 2 tablet 0     Sig: Take one tablet by oral route today, and repeat dose in 3 days time if needed.       There is no refill protocol information for this order

## 2021-06-16 NOTE — TELEPHONE ENCOUNTER
Please see below, patient has had 3 attempts at scheduling this. We are closing until patient responds.

## 2021-06-16 NOTE — TELEPHONE ENCOUNTER
Appears she is following with another clinic. Needs clinic visit for refill of Ajit Christine MD  3/18/2021         soft/tender RUQ

## 2021-06-16 NOTE — TELEPHONE ENCOUNTER
RN cannot approve Refill Request    RN can NOT refill this medication med is not covered by policy/route to provider. Last office visit: Visit date not found Last Physical: Visit date not found Last MTM visit: Visit date not found Last visit same specialty: 5/21/2019 Feli, Daniella, JANELLEP.  Next visit within 3 mo: Visit date not found  Next physical within 3 mo: Visit date not found      Vonda Rivera, [facility] Triage/Med Refill 4/13/2021    Requested Prescriptions   Pending Prescriptions Disp Refills     ondansetron (ZOFRAN-ODT) 4 MG disintegrating tablet [Pharmacy Med Name: Ondansetron Oral Tablet Disintegrating 4 MG] 30 tablet 0     Sig: Take 1 tablet (4 mg) by mouth every 8 hours as needed for nausea.       There is no refill protocol information for this order           
altered mental status

## 2021-06-16 NOTE — TELEPHONE ENCOUNTER
Telephone Encounter by Sam Montez MA at 7/22/2019 11:39 AM     Author: Sam Montez MA Service: -- Author Type: Medical Assistant    Filed: 7/22/2019 11:47 AM Encounter Date: 7/22/2019 Status: Signed    : Sam Montez MA (Medical Assistant)       Medication: traMADol (ULTRAM) 50 mg tablet    Last Date Filled 5/14/19     pulled: YES       Only PCP Prescribing?: YES    Taken as prescribed from physician notes?: YES  5/21/19 Patient also requesting some refills today on her chronic medications. Can use this also as a med check for her opioids.  Currently she is using tramadol, told substance agreement is up-to-date.  Continue medication as is.      CSA in last year: YES 9/20/18    Random Utox in last year: YES 12/28/18    Opioids + benzodiazepines? YES

## 2021-06-16 NOTE — TELEPHONE ENCOUNTER
Please help pt schedule est care. If pt is having current symptoms that need to be treated with the fluconazole she will need an appt

## 2021-06-16 NOTE — TELEPHONE ENCOUNTER
RN cannot approve Refill Request    RN can NOT refill this medication med is not covered by policy/route to provider. Last office visit: Visit date not found Last Physical: Visit date not found Last MTM visit: Visit date not found Last visit same specialty: 5/21/2019 Daniella Edmond, JANELLEP.  Next visit within 3 mo: Visit date not found  Next physical within 3 mo: Visit date not found      Tammie F Severson, [facility] Triage/Med Refill 3/18/2021    Requested Prescriptions   Pending Prescriptions Disp Refills     ondansetron (ZOFRAN-ODT) 4 MG disintegrating tablet [Pharmacy Med Name: Ondansetron Oral Tablet Disintegrating 4 MG] 30 tablet 0     Sig: Take 1 tablet (4 mg) by mouth every 8 hours as needed for nausea.       There is no refill protocol information for this order

## 2021-06-16 NOTE — TELEPHONE ENCOUNTER
Third Attempt: I mailed a letter to the patient to please call our office to schedule an [facility] visit. Closing until the patient responds. OK to schedule when the patient calls back.

## 2021-06-17 NOTE — PROGRESS NOTES
Reviewed this patient's chart as I'm covering for Daniella Edmond while she is on maternity leave.  Patient requests oxycodone 5 mg x 30 tablets. She's also on Tramadol 50 mg x 180 tablets/monthly. She also on Ativan 1 mg x 60 tablets monthly. There's only controlled substance agreement for Ativan 8/2017, CSA for Tramadol 1/2017 and needs to be updated. There's no CSA for oxycodone. I checked patient's  and apparently Daniella Edmond has been giving her Tramadol and oxycodone but she's getting Ativan from another provider in [place]. I am willing to give patient another 30 tablets of oxycodone 5 mg but controlled substance agreement needs to be updated for oxycodone and tramadol when she comes back from leave. Last oxycodone was given 12/2017 but last Tramadol given 4/2018. She was given oxycodone for post surgical pain and she's at least 4 months out from her surgery.

## 2021-06-19 NOTE — LETTER
Letter by Daniella Edmond FNP at      Author: Daniella Edmond FNP Service: -- Author Type: --    Filed:  Encounter Date: 3/20/2019 Status: (Other)       Amena Rosario  [address]  [place] [address]                 March 20, 2019      Dear Amena:     At your previous appointment with us your blood pressure was elevated and I would like for you to schedule a nurse only appointment to recheck your blood pressure.    We have included a personalized hypertension report card on the following page that lists your most recent blood pressure readings along with your ideal values. Please message us through [facility] or call us at [phone] to schedule your nurse only appointment.    Thank you for including us as members of your health care team.      Sincerely,         Daniella Edmond FNP    Enclosure    Hypertension Report Card for Amena Rosario  March 20, 2019:     Below is a summary of recent tests related to your hypertension.     Blood Pressure:   Normal blood pressure values are 120/80 or lower. Your blood pressure values should be less than 120/80.      Your most recent blood pressure readings at our clinic were:   BP Readings from Last 3 Encounters:   12/28/18 149/77   10/24/18 143/83   09/20/18 158/77

## 2021-06-19 NOTE — LETTER
Letter by Daniella Edmond FNP at      Author: Daniella Edmond FNP Service: -- Author Type: --    Filed:  Encounter Date: 4/5/2019 Status: (Other)         April 5, 2019     Patient: Amena Rosario   YOB: 1970   Date of Visit: 4/5/2019       To Whom It May Concern:    It is my medical opinion that Amena Rosario is prescribed Tramadol chronically and oxycodone as needed for acute pain. I am aware of the potential interaction of slowed breathing between these medications and have counseled the patient on side effects. She has tolerated this combination previously without a problem. .    If you have any questions or concerns, please don't hesitate to call.    Sincerely,        Electronically signed by DEREK Malik

## 2021-06-20 NOTE — PROGRESS NOTES
ASSESSMENT:  1. Controlled substance agreement signed  Drugs of Abuse 1,Urine   2. SIADH (syndrome of inappropriate ADH production) (H)  Comprehensive Metabolic Panel   3. Moderate persistent asthma without complication     4. Acute right-sided low back pain with right-sided sciatica  methylPREDNISolone (MEDROL DOSEPACK) 4 mg tablet    lidocaine (LIDODERM) 5 %         PLAN:  Patient here today for medication check.  She has been having ongoing back pain that has been worsening over the past 4 weeks, if pain persists she would like to pursue evaluation at the spine center or with Dr. Bergman who she sees for her neck.  Asthma is under pretty good control, with infrequent use of her inhaler.    SIADH-patient with SIADH previously Most likely secondary to meningioma which was removed earlier this year.  Patient will get recheck of sodium today, continue to monitor as long as within her normal range.  She tends to run a little bit low normally.  No symptoms today of hyponatremia.  No problem-specific Assessment & Plan notes found for this encounter.    The following high BMI interventions were performed this visit: encouragement to exercise and weight monitoring  There are no Patient Instructions on file for this visit.    Orders Placed This Encounter   Procedures     Drugs of Abuse 1,Urine     Comprehensive Metabolic Panel     Medications Discontinued During This Encounter   Medication Reason     sodium chloride 1 gram tablet Therapy completed     docusate sodium (COLACE) 100 MG capsule Therapy completed     demeclocycline (DECLOMYCIN) 300 MG tablet Therapy completed     LORazepam (ATIVAN) 1 MG tablet Therapy completed       No Follow-up on file.    CHIEF COMPLAINT:  Chief Complaint   Patient presents with     Medication Management     med check     Back Pain     lower back pain, spreading to the left x 4 weeks       HISTORY OF PRESENT ILLNESS:  Amena is a 47 y.o. female here today with multiple complaints.  Patient  is here for med check and due to update her controlled substance agreement.  Will update drug screening today.  Patient takes tramadol on a regular basis, also had a recent prescription for oxycodone immediate release due to exacerbation of neck and back pain.  Patient follows with Dr. Bergman and has discussed surgery for her cervical pain in the past, unsure if she is going to do it at this point in time.  She is still recovering from the surgery for meningioma this past year.  Likely if they did proceed with any spinal surgery she will have to wait at least a year from this previous surgery until they would decide to go forward.  Patient had history of SIADH ADH secondary to the meningioma.  It always had some slightly low sodium levels but worse with finding of meningioma.  She has been off all of her medications that were helping to elevate the sodium levels from nephrology, she would like her levels checked today.  She is feeling pretty well with exception of some ongoing back pain that started the other day when she was carrying laundry.  He has had some right-sided low back pain with sciatica on and off.  It is quite bothersome and she is wondering what else she can do outside of pain medication to help with it.  She has had to use oxycodone on and off for pain relief but trying to limit that as much as she can.  Is been trying to exercise more and she may have aggravated it doing her workouts.    REVIEW OF SYSTEMS:      Pertinent items are noted in HPI.  All other systems are negative  PFSH:  Reviewed, no changes      TOBACCO USE:  History   Smoking Status     Former Smoker     Quit date: 12/25/2014   Smokeless Tobacco     Never Used       VITALS:  Vitals:    09/20/18 1425   BP: 158/77   Patient Site: Left Arm   Patient Position: Sitting   Cuff Size: Adult Regular   Pulse: 79   Resp: 16   Temp: 98.2  F (36.8  C)   TempSrc: Oral   Weight: 136 lb 6 oz (61.9 kg)     Wt Readings from Last 3 Encounters:    09/20/18 136 lb 6 oz (61.9 kg)   12/11/17 150 lb (68 kg)   11/29/17 150 lb 7 oz (68.2 kg)       PHYSICAL EXAM:   /77 (Patient Site: Left Arm, Patient Position: Sitting, Cuff Size: Adult Regular)  Pulse 79  Temp 98.2  F (36.8  C) (Oral)   Resp 16  Wt 136 lb 6 oz (61.9 kg)  LMP 09/19/2018  BMI 25.77 kg/m2  General appearance: alert, appears stated age and cooperative  Back: symmetric, no curvature. ROM normal. No CVA tenderness. Paraspinal tenderness at level L4-L5. Negative straight leg raise.  Lungs: clear to auscultation bilaterally  Heart: regular rate and rhythm, S1, S2 normal, no murmur, click, rub or gallop  Extremities: extremities normal, atraumatic, no cyanosis or edema  Pulses: 2+ and symmetric  Skin: Skin color, texture, turgor normal. No rashes or lesions  Neurologic: Grossly normal    DATA REVIEWED:  Additional History from Old Records Summarized (2): 2  Decision to Obtain Records (1): 0  Radiology Tests Summarized or Ordered (1): 0  Labs Reviewed or Ordered (1): 1  Medicine Test Summarized or Ordered (1): 00  Independent Review of EKG or X-RAY(2 each): 0    The visit lasted a total of 25 minutes face to face with the patient. Over 50% of the time was spent counseling and educating the patient about plan of care.    MEDICATIONS:  Current Outpatient Prescriptions   Medication Sig Dispense Refill     ADVAIR DISKUS 250-50 mcg/dose DISKUS INHALE ONE PUFF BY MOUTH TWICE DAILY 180 each 3     albuterol (PROAIR HFA) 90 mcg/actuation inhaler Inhale 2 puffs by mouth every 4 to 6 hours as needed 18 g 1     albuterol (PROVENTIL) 2.5 mg /3 mL (0.083 %) nebulizer solution Take 3 mL (2.5 mg total) by nebulization every 6 (six) hours as needed. 20 vial 1     baclofen (LIORESAL) 10 MG tablet take 2 tablets by mouth in the morning, 1 tablet at midday, and 2 tablets in the evening 150 tablet 0     EPINEPHrine (EPIPEN 2-KYLE) 0.3 mg/0.3 mL (1:1,000) atIn Inject 0.3 mg into the shoulder, thigh, or buttocks once.  Or as directed per MD       fluticasone (FLONASE) 50 mcg/actuation nasal spray SPRAY 1-2 SPRAYS INTO EACH NOSTRIL BY INTRANASAL ROUTE ONCE DAILY. 16 g 10     loratadine-pseudoephedrine (CLARITIN-D 24-HOUR)  mg per 24 hr tablet Take 1 tablet by mouth daily. 30 tablet 1     omeprazole (PRILOSEC) 20 MG capsule Take 1 capsule (20 mg total) by mouth daily before breakfast. 90 capsule 0     OXcarbazepine (TRILEPTAL) 600 MG tablet Take 2 tablets (1,200 mg total) by mouth 2 (two) times a day. 360 tablet 1     oxyCODONE (ROXICODONE) 5 MG immediate release tablet Take 1 tablet for pain 8-10/10 every 4 hours as needed. 30 tablet 0     PROAIR HFA 90 mcg/actuation inhaler INHALE 2 PUFFS BY INHALATION ROUTE EVERY 4 TO 6 HOURS AS NEEDED 8.5 g 3     QUEtiapine (SEROQUEL) 200 MG tablet Take 1 tablet (200 mg total) by mouth at bedtime as needed. (Patient taking differently: Take 100-200 mg by mouth at bedtime as needed. ) 90 tablet 0     traMADol (ULTRAM) 50 mg tablet Take 1-2 tablets by oral route every 6 hours 180 tablet 0     triamcinolone (KENALOG) 0.1 % cream Apply 1 application topically see administration instructions. Apply a thin layer to eczema patches twice daily for a max of 2 weeks.       lidocaine (LIDODERM) 5 % Remove & Discard patch within 12 hours or as directed by MD 15 patch 1     methylPREDNISolone (MEDROL DOSEPACK) 4 mg tablet Take 1 tablet (4 mg total) by mouth daily for 6 days. Follow package directions 21 tablet 0     No current facility-administered medications for this visit.        This note has been dictated using voice recognition software. Any grammatical or context distortions are unintentional and inherent to the software

## 2021-06-21 NOTE — PROGRESS NOTES
ASSESSMENT:  1. Controlled substance agreement signed  Drug Abuse 1+, Urine   2. Muscle spasms of neck  baclofen (LIORESAL) 10 MG tablet   3. Meningioma (H)  oxyCODONE (ROXICODONE) 5 MG immediate release tablet   4. Brain compression (H)  oxyCODONE (ROXICODONE) 5 MG immediate release tablet         PLAN:  Update urine drug screen today, refills provided of medications.  Discussed that if needing continued breakthrough pain relief would discuss going back to pain clinic.  No problem-specific Assessment & Plan notes found for this encounter.      There are no Patient Instructions on file for this visit.    Orders Placed This Encounter   Procedures     Drug Abuse 1+, Urine     Medications Discontinued During This Encounter   Medication Reason     PROAIR HFA 90 mcg/actuation inhaler Therapy completed     baclofen (LIORESAL) 10 MG tablet Reorder     oxyCODONE (ROXICODONE) 5 MG immediate release tablet Reorder       No Follow-up on file.    CHIEF COMPLAINT:  Chief Complaint   Patient presents with     Follow-up     neck pain      Medication Refill       HISTORY OF PRESENT ILLNESS:  Amena is a 47 y.o. female today for follow-up from her previous visit.  We had obtained her controlled substance agreement and THC she showed in her urine.  She states she had used this 1 time only, with a recent  and some stress with some family issues.  She has not used since, it has been almost 1 month, urine should be clean at this time she suspects.  We will retest today.  We discussed the importance of not using any other controlled substances or illegal substances along with her medications.  She demonstrates understanding of this and states that we will not happen again.  We discussed closer monitoring of urine drug screening every 6 months or every 3 months initially until we see a few that are clean.  Patient agrees to this.  Recently reinjured her neck carrying heavy objects.  Asking for a short-term supply of additional pain  medication today.  She continues to work with Dr. Bergman and do some exercises on a regular basis that she received from physical therapy and these have been helping.    REVIEW OF SYSTEMS:      Pertinent items are noted in HPI.  All other systems are negative  PFSH:  Reviewed, no changes      TOBACCO USE:  History   Smoking Status     Former Smoker     Quit date: 12/25/2014   Smokeless Tobacco     Never Used       VITALS:  Vitals:    10/24/18 1321 10/24/18 1354   BP: (!) 153/92 143/83   Patient Site: Left Arm Left Arm   Patient Position: Sitting Sitting   Cuff Size: Adult Regular Adult Regular   Pulse: 90    Resp: 14    Weight: 140 lb 6 oz (63.7 kg)      Wt Readings from Last 3 Encounters:   10/24/18 140 lb 6 oz (63.7 kg)   09/20/18 136 lb 6 oz (61.9 kg)   12/11/17 150 lb (68 kg)       PHYSICAL EXAM:   /83 (Patient Site: Left Arm, Patient Position: Sitting, Cuff Size: Adult Regular)  Pulse 90  Resp 14  Wt 140 lb 6 oz (63.7 kg)  LMP 10/21/2018  BMI 26.52 kg/m2  General appearance: alert, appears stated age and cooperative  Ears: normal TM's and external ear canals both ears  Nose: Nares normal. Septum midline. Mucosa normal. No drainage or sinus tenderness.  Throat: lips, mucosa, and tongue normal; teeth and gums normal  Neck: no adenopathy, no carotid bruit, no JVD, supple, symmetrical, trachea midline and thyroid not enlarged, symmetric, no tenderness/mass/nodules  Lungs: clear to auscultation bilaterally  Heart: regular rate and rhythm, S1, S2 normal, no murmur, click, rub or gallop    DATA REVIEWED:  Additional History from Old Records Summarized (2): 0  Decision to Obtain Records (1): 0  Radiology Tests Summarized or Ordered (1): 0  Labs Reviewed or Ordered (1): 1  Medicine Test Summarized or Ordered (1): 0  Independent Review of EKG or X-RAY(2 each): 0    The visit lasted a total of 20 minutes face to face with the patient. Over 50% of the time was spent counseling and educating the patient about  plan of care.    MEDICATIONS:  Current Outpatient Prescriptions   Medication Sig Dispense Refill     ADVAIR DISKUS 250-50 mcg/dose DISKUS INHALE ONE PUFF BY MOUTH TWICE DAILY 180 each 3     albuterol (PROAIR HFA) 90 mcg/actuation inhaler Inhale 2 puffs by mouth every 4 to 6 hours as needed 18 g 1     albuterol (PROVENTIL) 2.5 mg /3 mL (0.083 %) nebulizer solution Take 3 mL (2.5 mg total) by nebulization every 6 (six) hours as needed. 20 vial 1     baclofen (LIORESAL) 10 MG tablet Take 2 tablets by mouth in the morning, 1 tablet at midday, and 2 tablets in the evening 150 tablet 0     EPINEPHrine (EPIPEN 2-KYLE) 0.3 mg/0.3 mL (1:1,000) atIn Inject 0.3 mg into the shoulder, thigh, or buttocks once. Or as directed per MD       fluticasone (FLONASE) 50 mcg/actuation nasal spray SPRAY 1-2 SPRAYS INTO EACH NOSTRIL BY INTRANASAL ROUTE ONCE DAILY. 16 g 10     loratadine-pseudoephedrine (CLARITIN-D 24-HOUR)  mg per 24 hr tablet Take 1 tablet by mouth daily. 30 tablet 1     omeprazole (PRILOSEC) 20 MG capsule Take 1 capsule by mouth daily before breakfast 90 capsule 3     OXcarbazepine (TRILEPTAL) 600 MG tablet Take 2 tablets (1,200 mg total) by mouth 2 (two) times a day. 360 tablet 1     oxyCODONE (ROXICODONE) 5 MG immediate release tablet Take 1 tablet for pain 8-10/10 every 4 hours as needed. 30 tablet 0     QUEtiapine (SEROQUEL) 200 MG tablet Take 1 tablet (200 mg total) by mouth at bedtime as needed. (Patient taking differently: Take 100-200 mg by mouth at bedtime as needed. ) 90 tablet 0     traMADol (ULTRAM) 50 mg tablet Take 1-2 tablets by oral route every 6 hours 180 tablet 0     triamcinolone (KENALOG) 0.1 % cream Apply 1 application topically see administration instructions. Apply a thin layer to eczema patches twice daily for a max of 2 weeks.       lidocaine (LIDODERM) 5 % Remove & Discard patch within 12 hours or as directed by MD 15 patch 1     No current facility-administered medications for this visit.         This note has been dictated using voice recognition software. Any grammatical or context distortions are unintentional and inherent to the software

## 2021-06-22 NOTE — TELEPHONE ENCOUNTER
----- Message from DEREK Malik sent at 1/2/2019  8:11 AM CST -----  Results as expected for drug screen.

## 2021-06-22 NOTE — PROGRESS NOTES
ASSESSMENT:  1. Continuous opioid dependence (H)  Drug Abuse 1+, Urine   2. Protrusion of cervical intervertebral disc  traMADol (ULTRAM) 50 mg tablet   3. Meningioma (H)  oxyCODONE (ROXICODONE) 5 MG immediate release tablet   4. Brain compression (H)  oxyCODONE (ROXICODONE) 5 MG immediate release tablet   5. Overactive bladder  Urinalysis-UC if Indicated       PLAN:  Patient here today to repeat urine drug screening since previous had THC.  Urine drug screening is clear today so we will prescribe medications for her.  She has had some ongoing acute pain since her surgery for meningioma and has had some worsening shoulder pain.  Discussed need for acute oxycodone and policy of coming in to see us every 2-3 months for medication check regarding these medications.  Patient demonstrated understanding and agreement to come in more frequently for checkups.  Understands that medication but can be denied if she does not come in or has other chemical showing up in urine again.  Patient would like urinalysis today related to feeling of urinary hesitancy, occasional urinary frequency.  Discussed that this could be a medication effect but we will run a UA today and see if any signs of infection.  If it persists she should let us know could further look into medications or urology consultation.  No problem-specific Assessment & Plan notes found for this encounter.      There are no Patient Instructions on file for this visit.    Orders Placed This Encounter   Procedures     Drug Abuse 1+, Urine     Urinalysis-UC if Indicated     Medications Discontinued During This Encounter   Medication Reason     traMADol (ULTRAM) 50 mg tablet Reorder     oxyCODONE (ROXICODONE) 5 MG immediate release tablet Reorder       No Follow-up on file.    CHIEF COMPLAINT:  Chief Complaint   Patient presents with     Follow-up     retest urine drug screen        HISTORY OF PRESENT ILLNESS:  Amena is a 48 y.o. female today for evaluation of controlled  substances.  She needed to retest her urine drug screen today due to having THC show up in her urine a couple of times last 2 screenings.  Patient states she has no longer been using marijuana.  We discussed the need to not use any additional substances to what she is already taking and she demonstrated understanding today.  States she will not been using any longer.  Discussed that we would like her to come him more frequently for med checks and urine drug screening randomly with visits every 3 months.  She is amenable to this and understands the need for more rigorous checking due to controlled substance use chronically.  Has been needing extra oxycodone since injuring her shoulder and neck a couple of months ago.  Discussed that we do not want this to become long-term but for now we can continue to use sparingly.  Patient has some urinary hesitancy she would like to discuss today, unsure if it is related to medication use or urinary tract infection.  States it coincided with her use of oxycodone and some could be due to that.  She is also recently increased her Seroquel and so could be medication effect from that as well.  Discussed monitoring and we will check urine today for infection.  Follow-up if not improving.    REVIEW OF SYSTEMS:        All other systems are negative  PFSH:  Reviewed, no changes      TOBACCO USE:  Social History     Tobacco Use   Smoking Status Former Smoker     Last attempt to quit: 2014     Years since quittin.0   Smokeless Tobacco Never Used       VITALS:  Vitals:    18 1304 18 1413   BP: 163/86 149/77   Patient Site: Left Arm Left Arm   Patient Position: Sitting Sitting   Cuff Size: Adult Regular Adult Regular   Pulse: 88 76   Resp: 16    Weight: 141 lb 2 oz (64 kg)      Wt Readings from Last 3 Encounters:   18 141 lb 2 oz (64 kg)   10/24/18 140 lb 6 oz (63.7 kg)   18 136 lb 6 oz (61.9 kg)       PHYSICAL EXAM:   /77 (Patient Site: Left Arm,  Patient Position: Sitting, Cuff Size: Adult Regular)   Pulse 76   Resp 16   Wt 141 lb 2 oz (64 kg)   BMI 26.67 kg/m    General appearance: alert, appears stated age and cooperative  Head: Normocephalic, without obvious abnormality, atraumatic  Eyes: conjunctivae/corneas clear. PERRL, EOM's intact. Fundi benign.  Neck: no adenopathy, no carotid bruit, no JVD, supple, symmetrical, trachea midline and thyroid not enlarged, symmetric, no tenderness/mass/nodules  Lungs: clear to auscultation bilaterally  Heart: regular rate and rhythm, S1, S2 normal, no murmur, click, rub or gallop  Abdomen: soft, non-tender; bowel sounds normal; no masses,  no organomegaly  Extremities: extremities normal, atraumatic, no cyanosis or edema  Pulses: 2+ and symmetric  Neurologic: Grossly normal    DATA REVIEWED:  Additional History from Old Records Summarized (2): 2  Decision to Obtain Records (1): 0  Radiology Tests Summarized or Ordered (1): 0  Labs Reviewed or Ordered (1): 1  Medicine Test Summarized or Ordered (1): 0  Independent Review of EKG or X-RAY(2 each): 0    The visit lasted a total of 35 minutes face to face with the patient. Over 50% of the time was spent counseling and educating the patient about plan of care, frequent follow up due to high risk medication use.    MEDICATIONS:  Current Outpatient Medications   Medication Sig Dispense Refill     albuterol (PROAIR HFA) 90 mcg/actuation inhaler Inhale 2 puffs by mouth every 4 to 6 hours as needed 18 g 1     albuterol (PROVENTIL) 2.5 mg /3 mL (0.083 %) nebulizer solution Take 3 mL (2.5 mg total) by nebulization every 6 (six) hours as needed. 20 vial 1     baclofen (LIORESAL) 10 MG tablet Take 2 tablets by mouth in the morning, take 1 tablet at midday, and take 2 tablets in the evening. 150 tablet 0     EPINEPHrine (EPIPEN 2-KYLE) 0.3 mg/0.3 mL (1:1,000) atIn Inject 0.3 mg into the shoulder, thigh, or buttocks once. Or as directed per MD       fluticasone (FLONASE) 50  mcg/actuation nasal spray SPRAY 1-2 SPRAYS INTO EACH NOSTRIL BY INTRANASAL ROUTE ONCE DAILY. 16 g 10     fluticasone-salmeterol (ADVAIR DISKUS) 250-50 mcg/dose DISKUS Inhale 1 puff 2 (two) times a day. 180 each 3     lidocaine (LIDODERM) 5 % Remove & Discard patch within 12 hours or as directed by MD 15 patch 1     loratadine-pseudoephedrine (CLARITIN-D 24-HOUR)  mg per 24 hr tablet Take 1 tablet by mouth daily. 30 tablet 1     omeprazole (PRILOSEC) 20 MG capsule Take 1 capsule by mouth daily before breakfast 90 capsule 3     OXcarbazepine (TRILEPTAL) 600 MG tablet Take 2 tablets (1,200 mg total) by mouth 2 (two) times a day. 360 tablet 1     oxyCODONE (ROXICODONE) 5 MG immediate release tablet Take 1 tablet by oral route for pain 8-10/10 every 4 hours as needed. For acute pain can in addition to Tramadol. 30 tablet 0     PROAIR HFA 90 mcg/actuation inhaler INHALE 2 PUFFS BY INHALATION ROUTE EVERY 4 TO 6 HOURS AS NEEDED 8.5 g 2     QUEtiapine (SEROQUEL) 200 MG tablet Take 1 tablet (200 mg total) by mouth at bedtime as needed. (Patient taking differently: Take 100-200 mg by mouth at bedtime as needed. ) 90 tablet 0     traMADol (ULTRAM) 50 mg tablet Take 1-2 tablets every 6 hours as needed for pain. 180 tablet 0     triamcinolone (KENALOG) 0.1 % cream Apply 1 application topically see administration instructions. Apply a thin layer to eczema patches twice daily for a max of 2 weeks.       No current facility-administered medications for this visit.        This note has been dictated using voice recognition software. Any grammatical or context distortions are unintentional and inherent to the software

## 2021-06-23 NOTE — TELEPHONE ENCOUNTER
Neck pain is an issue. 2 pills.    Controlled Substance Refill Request  Medication Name:   Requested Prescriptions     Pending Prescriptions Disp Refills     oxyCODONE (ROXICODONE) 5 MG immediate release tablet 30 tablet 0     Sig: Take 1 tablet by oral route for pain 8-10/10 every 4 hours as needed. For acute pain can in addition to Tramadol.     Date Last Fill: 12/28/18  [facility]  This is not on her contract.  If need to send to [facility]      Submit electronically to pharmacy  Controlled Substance Agreement Date Scanned:   Encounter-Level CSA Scan Date - 09/19/2017:    Scan on 9/25/2017  9:14 AM (below)             Encounter-Level CSA Scan Date - 08/24/2017:    Scan on 8/28/2017 12:33 PM (below)             Encounter-Level CSA Scan Date - 01/13/2017:    Scan on 2/2/2017  3:15 PM (below)         Last office visit with prescriber/PCP: 12/28/2018 Daniella Edmond FNP OR same dept: 12/28/2018 Daniella Edmond FNP OR same specialty: 12/28/2018 Daniella Edmond FNP  Last physical: 10/17/2017 Last MTM visit: Visit date not found

## 2021-06-23 NOTE — TELEPHONE ENCOUNTER
Controlled Substance Refill Request  Medication:   Requested Prescriptions     Pending Prescriptions Disp Refills     traMADol (ULTRAM) 50 mg tablet [Pharmacy Med Name: TraMADol HCl Oral Tablet 50 MG] 180 tablet 0     Sig: TAKE 1-2 TABLETS EVERY 6 HOURS AS NEEDED FOR PAIN.     Date Last Fill: 12/28/18  [facility]   Submit electronically to pharmacy  Controlled Substance Agreement on File:   Encounter-Level CSA Scan Date - 09/19/2017:    Scan on 9/25/2017  9:14 AM (below)             Encounter-Level CSA Scan Date - 08/24/2017:    Scan on 8/28/2017 12:33 PM (below)             Encounter-Level CSA Scan Date - 01/13/2017:    Scan on 2/2/2017  3:15 PM (below)         Last office visit: Last office visit pertaining to requested medication was 12/28/18.

## 2021-06-23 NOTE — TELEPHONE ENCOUNTER
RN cannot approve Refill Request    RN can NOT refill this medication med is not covered by policy/route to provider. Last office visit: 12/28/2018 Daniella Edmond FNP Last Physical: 10/17/2017 Last MTM visit: Visit date not found Last visit same specialty: 12/28/2018 Daniella Edmond FNP.  Next visit within 3 mo: Visit date not found  Next physical within 3 mo: Visit date not found      Sandra Henry, [facility] Triage/Med Refill 1/23/2019    Requested Prescriptions   Pending Prescriptions Disp Refills     baclofen (LIORESAL) 10 MG tablet [Pharmacy Med Name: Baclofen Oral Tablet 10 MG] 150 tablet 0     Sig: TAKE 2 TABLETS BY MOUTH IN THE MORNING, TAKE 1 TABLET AT MIDDAY, AND TAKE 2 TABLETS IN THE EVENING.    There is no refill protocol information for this order

## 2021-06-23 NOTE — TELEPHONE ENCOUNTER
Refill Approved    Rx renewed per Medication Renewal Policy. Medication was last renewed on 12/12/18.    Kenzie Coronel, [facility] Triage/Med Refill 2/4/2019     Requested Prescriptions   Pending Prescriptions Disp Refills     PROAIR HFA 90 mcg/actuation inhaler [Pharmacy Med Name: ProAir HFA Inhalation Aerosol Solution 108 (90 Base) MCG/ACT] 8.5 g 1     Sig: INHALE 2 PUFFS BY INHALATION ROUTE EVERY 4 TO 6 HOURS AS NEEDED    Albuterol/Levalbuterol Refill Protocol Passed - 2/1/2019  3:40 PM       Passed - PCP or prescribing provider visit in last year    Last office visit with prescriber/PCP: 12/28/2018 Daniella Edmond FNP OR same dept: 12/28/2018 Daniella Edmond FNP OR same specialty: 12/28/2018 Daniella Edmond FNP Last physical: 10/17/2017       Next appt within 3 mo: Visit date not found  Next physical within 3 mo: Visit date not found  Prescriber OR PCP: DEREK Malik  Last diagnosis associated with med order: 1. Asthma exacerbation  - PROAIR HFA 90 mcg/actuation inhaler [Pharmacy Med Name: ProAir HFA Inhalation Aerosol Solution 108 (90 Base) MCG/ACT]; INHALE 2 PUFFS BY INHALATION ROUTE EVERY 4 TO 6 HOURS AS NEEDED  Dispense: 8.5 g; Refill: 1    If protocol passes may refill for 6 months if within 3 months of last provider visit (or a total of 9 months). If patient requesting >1 inhaler per month refill x 6 months and have patient make appointment with provider.

## 2021-06-24 NOTE — TELEPHONE ENCOUNTER
Controlled Substance Refill Request  Medication:   Requested Prescriptions     Pending Prescriptions Disp Refills     oxyCODONE (ROXICODONE) 5 MG immediate release tablet [Pharmacy Med Name: oxyCODONE HCl Oral Tablet 5 MG] 30 tablet 0     Sig: Take 1 tablet by oral route for pain 8-10/10 every 4 hours as needed. For acute pain can take in addition or alternate with Tramadol.     Date Last Fill: 1/22/19  [facility]   Submit electronically to pharmacy  Controlled Substance Agreement on File:   Encounter-Level CSA Scan Date - 09/19/2017:    Scan on 9/25/2017  9:14 AM (below)             Encounter-Level CSA Scan Date - 08/24/2017:    Scan on 8/28/2017 12:33 PM (below)             Encounter-Level CSA Scan Date - 01/13/2017:    Scan on 2/2/2017  3:15 PM (below)         Last office visit: Last office visit pertaining to requested medication was 12/28/18.

## 2021-06-24 NOTE — TELEPHONE ENCOUNTER
RN cannot approve Refill Request    RN can NOT refill this medication med is not covered by policy/route to provider. Last office visit: 12/28/2018 Daniella Edmond FNP Last Physical: 10/17/2017 Last MTM visit: Visit date not found Last visit same specialty: 12/28/2018 Daniella Edmond FNP.  Next visit within 3 mo: Visit date not found  Next physical within 3 mo: Visit date not found      Claudia Montilla, [facility] Triage/Med Refill 2/23/2019    Requested Prescriptions   Pending Prescriptions Disp Refills     baclofen (LIORESAL) 10 MG tablet [Pharmacy Med Name: Baclofen Oral Tablet 10 MG] 150 tablet 0     Sig: TAKE 2 TABLETS BY MOUTH IN THE MORNING, TAKE 1 TABLET AT MIDDAY, AND TAKE 2 TABLETS IN THE EVENING.    There is no refill protocol information for this order

## 2021-06-25 NOTE — TELEPHONE ENCOUNTER
Writer left message for patient to return call to clinic. When she returns the call, please let her know that with these symptoms she is currently having, we would like to have her get her MRI now instead of in August and then follow up with us here in clinic. She will need to establish care with one of our providers.   Bony Goss LPN

## 2021-06-25 NOTE — TELEPHONE ENCOUNTER
Patient calling to report new symptoms and requesting a brain scan. Craniotomy w meningioma resection 10/2017. She reports intense headaches and eye twitching. Also mentioned spasms on top of head and templs and being able to 'feel the blood get stuck' in the brain.     Pls call [phone] to discuss w patient.

## 2021-06-25 NOTE — TELEPHONE ENCOUNTER
Controlled Substance Refill Request  Medication Name:   Requested Prescriptions     Pending Prescriptions Disp Refills     oxyCODONE (ROXICODONE) 5 MG immediate release tablet 30 tablet 0     Sig: Take 1 tablet by oral route for pain 8-10/10 every 4 hours as needed. For acute pain can take in addition or alternate with Tramadol.     Date Last Fill: 2/21/2019  Pharm[facility]: [facility]      Submit electronically to pharmacy  Controlled Substance Agreement Date Scanned:   Encounter-Level CSA Scan Date - 09/19/2017:    Scan on 9/25/2017  9:14 AM (below)             Encounter-Level CSA Scan Date - 08/24/2017:    Scan on 8/28/2017 12:33 PM (below)             Encounter-Level CSA Scan Date - 01/13/2017:    Scan on 2/2/2017  3:15 PM (below)         Last office visit with prescriber/PCP: 12/28/2018 Daniella Edmond FNP OR meir dept: 12/28/2018 Daniella Edmond FNP OR same specialty: 12/28/2018 Daniella Edmond FNP  Last physical: 10/17/2017 Last MTM visit: Visit date not found

## 2021-06-25 NOTE — TELEPHONE ENCOUNTER
Called Amena who reported no HA, no spasms, but some pressure at the base of her skull. No N/V, photo or phonophobia. Referred to scheduling for MRI and apt set up.  Shelly Hu RN, CNRN

## 2021-07-03 NOTE — ADDENDUM NOTE
Addendum Note by Daniella Hutchison FNP at 9/1/2017  3:14 PM     Author: Daniella Hutchison FNP Service: -- Author Type: Nurse Practitioner    Filed: 9/1/2017  3:14 PM Encounter Date: 9/1/2017 Status: Signed    : Daniella Hutchison FNP (Nurse Practitioner)    Addended by: DANIELLA HUTCHISON on: 9/1/2017 03:14 PM        Modules accepted: Orders

## 2021-07-03 NOTE — ADDENDUM NOTE
Addendum Note by Daniella Hutchison FNP at 4/25/2019 10:18 AM     Author: Daniella Hutchison FNP Service: -- Author Type: Nurse Practitioner    Filed: 4/25/2019 10:18 AM Encounter Date: 4/24/2019 Status: Signed    : Daniella Hutchison FNP (Nurse Practitioner)    Addended by: DANIELLA HUTCHISON on: 4/25/2019 10:18 AM        Modules accepted: Orders

## 2021-07-03 NOTE — ADDENDUM NOTE
Addendum Note by Daniella Hutchison FNP at 9/8/2017 12:58 PM     Author: Daniella Hutchison FNP Service: -- Author Type: Nurse Practitioner    Filed: 9/8/2017 12:58 PM Encounter Date: 9/6/2017 Status: Signed    : Daniella Hutchison FNP (Nurse Practitioner)    Addended by: DANIELLA HUTCHISON on: 9/8/2017 12:58 PM        Modules accepted: Orders

## 2021-07-09 ENCOUNTER — PATIENT OUTREACH (OUTPATIENT)
Dept: FAMILY MEDICINE | Facility: CLINIC | Age: 51
End: 2021-07-09

## 2021-07-09 DIAGNOSIS — R87.810 CERVICAL HIGH RISK HPV (HUMAN PAPILLOMAVIRUS) TEST POSITIVE: ICD-10-CM

## 2021-07-09 NOTE — LETTER
August 11, 2021      Amena Rosario  [address]   [place] [address]        Dear ,    This letter is to remind you that you are due for your follow-up Pap smear and Human Papillomavirus (HPV) test.    Please call [phone] to schedule your appointment at your earliest convenience.    If you have completed the appointment outside of the [facility] system, please have the records forwarded to our office. We will update your chart for your provider to review before your next annual wellness visit.     Thank you for choosing [facility]!      Sincerely,    Your [facility] Care Team

## 2021-07-09 NOTE — LETTER
July 9, 2021      Amena Rosario  [address]   [place] [address]        Dear ,    At [facility], your health and wellness are our primary concern. That is why we are following up on your most recent positive high-risk Human Papillomavirus (HPV) test.    Please call [phone] to schedule an appointment for your recommended follow-up Pap smear and Human Papillomavirus (HPV) test at your earliest convenience.     If you have completed the appointment outside of the [facility] system, please have the records forwarded to our office. We will update your chart for your provider to review before your next annual wellness visit.     Thank you for choosing [facility]!      Sincerely,    Your [facility] Care Team

## 2021-07-19 DIAGNOSIS — J45.40 MODERATE PERSISTENT ASTHMA WITHOUT COMPLICATION: ICD-10-CM

## 2021-07-20 NOTE — TELEPHONE ENCOUNTER
Routing refill request to provider for review/approval because:  Nasima given x1 and patient did not follow up, please advise

## 2021-07-21 ENCOUNTER — RECORDS - HEALTHEAST (OUTPATIENT)
Dept: ADMINISTRATIVE | Facility: CLINIC | Age: 51
End: 2021-07-21

## 2021-07-21 RX ORDER — ALBUTEROL SULFATE 90 UG/1
AEROSOL, METERED RESPIRATORY (INHALATION)
Qty: 8.5 G | Refills: 0 | Status: SHIPPED | OUTPATIENT
Start: 2021-07-21 | End: 2021-08-25

## 2021-07-28 DIAGNOSIS — J45.40 MODERATE PERSISTENT ASTHMA WITHOUT COMPLICATION: ICD-10-CM

## 2021-07-30 RX ORDER — FLUTICASONE PROPIONATE 50 MCG
1-2 SPRAY, SUSPENSION (ML) NASAL DAILY
Qty: 16 G | Refills: 0 | Status: SHIPPED | OUTPATIENT
Start: 2021-07-30 | End: 2021-09-17

## 2021-08-02 DIAGNOSIS — J45.40 MODERATE PERSISTENT ASTHMA WITHOUT COMPLICATION: ICD-10-CM

## 2021-08-05 RX ORDER — ALBUTEROL SULFATE 0.83 MG/ML
2.5 SOLUTION RESPIRATORY (INHALATION) EVERY 4 HOURS PRN
Qty: 75 ML | Refills: 0 | Status: SHIPPED | OUTPATIENT
Start: 2021-08-05 | End: 2022-11-02

## 2021-08-22 DIAGNOSIS — J45.40 MODERATE PERSISTENT ASTHMA WITHOUT COMPLICATION: ICD-10-CM

## 2021-08-25 DIAGNOSIS — J45.40 MODERATE PERSISTENT ASTHMA WITHOUT COMPLICATION: ICD-10-CM

## 2021-08-25 NOTE — TELEPHONE ENCOUNTER
Reason for Call:  Other prescription    Detailed comments: Pt calling for she is currently out of medication and would like to see if her PCP  can send an Rx to her Pharmacy by today for [place] and will have a hard time walking about without it.  She does plan to check back next week in making a follow up apt with her Provider.                                                                            Phone Number Patient can be reached at: Home number on file [phone] (home)    Best Time: anytime    Can we leave a detailed message on this number? YES    Call taken on 8/25/2021 at 12:47 PM by John Irvin

## 2021-08-26 RX ORDER — ALBUTEROL SULFATE 90 UG/1
AEROSOL, METERED RESPIRATORY (INHALATION)
Qty: 8.5 G | Refills: 0 | Status: SHIPPED | OUTPATIENT
Start: 2021-08-26 | End: 2021-10-08

## 2021-08-29 DIAGNOSIS — R11.0 NAUSEA: Primary | ICD-10-CM

## 2021-08-29 NOTE — TELEPHONE ENCOUNTER
"Routing refill request to provider for review/approval because:  Rx last signed for by a different provider.    Last Written Prescription Date:  4/14/21  Last Fill Quantity: 30,  # refills: 0   Last office visit provider:  7/27/21 with PCP for routine exam    Requested Prescriptions   Pending Prescriptions Disp Refills     ondansetron (ZOFRAN-ODT) 4 MG ODT tab [Pharmacy Med Name: Ondansetron Oral Tablet Disintegrating 4 MG] 30 tablet 0     Sig: Take 1 tablet (4 mg) by mouth every 8 hours as needed for nausea.        Antivertigo/Antiemetic Agents Failed - 8/29/2021  9:09 AM        Failed - Recent (12 mo) or future (30 days) visit within the authorizing provider's specialty     Patient has had an office visit with the authorizing provider or a provider within the authorizing providers department within the previous 12 mos or has a future within next 30 days. See \"Patient Info\" tab in inbasket, or \"Choose Columns\" in Meds & Orders section of the refill encounter.              Failed - Medication is active on med list        Passed - Patient is 18 years of age or older             Candy Cotton RN 08/29/21 4:49 PM  "

## 2021-08-30 RX ORDER — ONDANSETRON 4 MG/1
TABLET, ORALLY DISINTEGRATING ORAL
Qty: 30 TABLET | Refills: 0 | Status: SHIPPED | OUTPATIENT
Start: 2021-08-30 | End: 2021-11-17

## 2021-09-08 NOTE — TELEPHONE ENCOUNTER
FYI to provider - Patient is lost to pap tracking follow-up. Attempts to contact pt have been made per reminder process and there has been no reply and/or no appt scheduled.       7/27/20 abnormal pap 10+ years ago per patient  2012 NIL Pap, Neg HPV (Care Everywhere)  7/27/2020 NIL Pap, + HR HPV (neg 16/18). Plan cotest in 1 year, due 7/27/2021.    7/9/21 Reminder letter  8/9/21 Reminder call - vm box full.   8/11/21 Reminder call - vm box full. Letter sent.   9/8/21 Lost to follow up.

## 2021-09-15 DIAGNOSIS — J45.40 MODERATE PERSISTENT ASTHMA WITHOUT COMPLICATION: ICD-10-CM

## 2021-09-17 RX ORDER — FLUTICASONE PROPIONATE 50 MCG
SPRAY, SUSPENSION (ML) NASAL
Qty: 16 G | Refills: 0 | Status: SHIPPED | OUTPATIENT
Start: 2021-09-17 | End: 2021-11-24

## 2021-09-17 NOTE — TELEPHONE ENCOUNTER
Routing refill request to provider for review/approval because:  Nasima given and patient did not follow up, please advise      Nadeen Quintana RN

## 2021-09-22 ENCOUNTER — TELEPHONE (OUTPATIENT)
Dept: NEUROSURGERY | Facility: CLINIC | Age: 51
End: 2021-09-22

## 2021-09-22 DIAGNOSIS — Z86.018 HISTORY OF MENINGIOMA: Primary | ICD-10-CM

## 2021-09-22 NOTE — TELEPHONE ENCOUNTER
MRI orders placed. Pt should follow up in clinic with Dr. Luna as she was symptomatic the last time we saw her.     Silva Pendleton RN

## 2021-09-22 NOTE — TELEPHONE ENCOUNTER
Patient called to schedule yearly 'brain scan'. She missed her surveillance imaging last year. Please enter order for MRI. Also, should patient have telephone or in person visit to follow?

## 2021-09-23 DIAGNOSIS — I10 BENIGN ESSENTIAL HYPERTENSION: ICD-10-CM

## 2021-09-23 RX ORDER — LISINOPRIL 10 MG/1
10 TABLET ORAL DAILY
Qty: 90 TABLET | Refills: 0 | OUTPATIENT
Start: 2021-09-23

## 2021-09-23 NOTE — LETTER
September 24, 2021    Amena Rosario  [address]   [place] [address]    Dear Amena,       We recently received a refill request for lisinopril (ZESTRIL) .  We are UNABLE to fill this request at this time only because you are over due for a:    Blood Pressure office visit and a FASTING  lab appointment APPT. FOR FURTHER REFILLS.      Please schedule this lab appointment 4-5 days prior to the office visit.     Please call at your earliest convenience so that there will not be a delay with your future refills.          Thank you,   Your [facility] Team/[facility]  [phone]

## 2021-09-23 NOTE — TELEPHONE ENCOUNTER
"Requested Prescriptions   Pending Prescriptions Disp Refills    lisinopril (ZESTRIL) 10 MG tablet [Pharmacy Med Name: Lisinopril Oral Tablet 10 MG] 90 tablet 0     Sig: Take 1 tablet (10 mg) by mouth daily        ACE Inhibitors (Including Combos) Protocol Failed - 9/23/2021 10:14 AM        Failed - Blood pressure under 140/90 in past 12 months       BP Readings from Last 3 Encounters:   07/27/20 112/70   12/03/19 95/63                 Failed - Recent (12 mo) or future (30 days) visit within the authorizing provider's specialty     Patient has had an office visit with the authorizing provider or a provider within the authorizing providers department within the previous 12 mos or has a future within next 30 days. See \"Patient Info\" tab in inbasket, or \"Choose Columns\" in Meds & Orders section of the refill encounter.              Failed - Normal serum creatinine on file in past 12 months     Recent Labs   Lab Test 06/23/20  1317   CR 0.91       Ok to refill medication if creatinine is low          Failed - Normal serum potassium on file in past 12 months     Recent Labs   Lab Test 06/23/20  1317   POTASSIUM 4.4             Passed - Medication is active on med list        Passed - Patient is age 18 or older        Passed - No active pregnancy on record        Passed - No positive pregnancy test within past 12 months             "

## 2021-10-04 ENCOUNTER — NURSE TRIAGE (OUTPATIENT)
Dept: NURSING | Facility: CLINIC | Age: 51
End: 2021-10-04

## 2021-10-04 DIAGNOSIS — J45.40 MODERATE PERSISTENT ASTHMA WITHOUT COMPLICATION: ICD-10-CM

## 2021-10-04 NOTE — TELEPHONE ENCOUNTER
"Patient is requesting a refill on her Albuterol inhaler- states her allergies and asthma \"are bad\" and she misplaced her Albuterol inhaler.   Preferred pharmacy: [facility]  Last office visit- 7/27/2020  PCP: April Bergeron PA-C    Page sent via smart web- MD consult with Dr. Street was paged by RN at 6:47 pm. Reason for page: Medication Refill    Dr Street called back, and states he reviewed patient's chart and patient hasn't been seen in over one year, and patient has requested many medications for refill and patient was advised to make an appointment in the clinic. Patient was already given the manish refill for Albuterol inhaler. Patient still hasn't made an appointment. On call provider states patient can go to urgent care tonight if her symptoms are bad enough, or make an appointment with PCP for refills.     RN did call the patient to notify her what the on call provider recommended, patient was upset and saying how this is ridiculous and stating she hasn't made an appointment yet because she has a brain scan coming up first, and patient then hung up the phone.     Virginia Abdul RN 10/04/21 6:50 PM  [facility] Nurse Advisor      Reason for Disposition    [1] Request for URGENT new prescription or refill of \"essential\" medication (i.e., likelihood of harm to patient if not taken) AND [2] triager unable to fill per unit policy    Additional Information    Negative: Drug overdose and triager unable to answer question    Negative: Caller requesting information unrelated to medicine    Negative: Caller requesting a prescription for Strep throat and has a positive culture result    Negative: Rash while taking a medication or within 3 days of stopping it    Negative: Immunization reaction suspected    Negative: [1] Asthma and [2] having symptoms of asthma (cough, wheezing, etc.)    Negative: [1] Influenza symptoms AND [2] anti-viral med prescription request, such as Tamiflu    Negative: [1] " Symptom of illness (e.g., headache, abdominal pain, earache, vomiting) AND [2] more than mild    Negative: MORE THAN A DOUBLE DOSE of a prescription or over-the-counter (OTC) drug    Negative: [1] DOUBLE DOSE (an extra dose or lesser amount) of over-the-counter (OTC) drug AND [2] any symptoms (e.g., dizziness, nausea, pain, sleepiness)    Negative: [1] DOUBLE DOSE (an extra dose or lesser amount) of prescription drug AND [2] any symptoms (e.g., dizziness, nausea, pain, sleepiness)    Negative: Took another person's prescription drug    Negative: [1] DOUBLE DOSE (an extra dose or lesser amount) of prescription drug AND [2] NO symptoms (Exception: a double dose of antibiotics)    Negative: Diabetes drug error or overdose (e.g., took wrong type of insulin or took extra dose)    Protocols used: MEDICATION QUESTION CALL-A-

## 2021-10-06 NOTE — TELEPHONE ENCOUNTER
Routing refill request to provider for review/approval because:  Needs provider approval, failed protocol.  Shefali Marc RN  [facility]

## 2021-10-07 NOTE — TELEPHONE ENCOUNTER
Patient is due for follow up, been over  1 year. Please assist with scheduling then route back for refill

## 2021-10-08 RX ORDER — ALBUTEROL SULFATE 90 UG/1
AEROSOL, METERED RESPIRATORY (INHALATION)
Qty: 8.5 G | Refills: 0 | Status: SHIPPED | OUTPATIENT
Start: 2021-10-08 | End: 2021-11-05

## 2021-10-13 ENCOUNTER — HOSPITAL ENCOUNTER (OUTPATIENT)
Dept: MRI IMAGING | Facility: HOSPITAL | Age: 51
Discharge: HOME | End: 2021-10-13
Attending: SURGERY | Admitting: SURGERY
Payer: COMMERCIAL

## 2021-10-13 DIAGNOSIS — Z86.018 HISTORY OF MENINGIOMA: ICD-10-CM

## 2021-10-13 PROCEDURE — A9585 GADOBUTROL INJECTION: HCPCS | Performed by: SURGERY

## 2021-10-13 PROCEDURE — 70553 MRI BRAIN STEM W/O & W/DYE: CPT

## 2021-10-13 PROCEDURE — 255N000002 HC RX 255 OP 636: Performed by: SURGERY

## 2021-10-13 RX ORDER — GADOBUTROL 604.72 MG/ML
7 INJECTION INTRAVENOUS ONCE
Status: COMPLETED | OUTPATIENT
Start: 2021-10-13 | End: 2021-10-13

## 2021-10-13 RX ADMIN — GADOBUTROL 7 ML: 604.72 INJECTION INTRAVENOUS at 15:41

## 2021-10-15 ENCOUNTER — OFFICE VISIT (OUTPATIENT)
Dept: NEUROSURGERY | Facility: CLINIC | Age: 51
End: 2021-10-15
Payer: COMMERCIAL

## 2021-10-15 VITALS
HEIGHT: 61 IN | WEIGHT: 159 LBS | OXYGEN SATURATION: 98 % | BODY MASS INDEX: 30.02 KG/M2 | DIASTOLIC BLOOD PRESSURE: 65 MMHG | HEART RATE: 81 BPM | SYSTOLIC BLOOD PRESSURE: 116 MMHG

## 2021-10-15 DIAGNOSIS — D32.9 MENINGIOMA (H): Primary | ICD-10-CM

## 2021-10-15 PROCEDURE — 99213 OFFICE O/P EST LOW 20 MIN: CPT | Performed by: SURGERY

## 2021-10-15 ASSESSMENT — MIFFLIN-ST. JEOR: SCORE: 1268.6

## 2021-10-15 NOTE — PROGRESS NOTES
The patient is a 50-year-old female.  In October 2017 she had removal of a huge posterior fossa meningioma.  Pathology was grade 1 meningioma.  She had a new scan this month.  There is no residual or recurrent tumor.  I did show the pictures to the patient.  She is happy with the outcome.  Plan new scan in 2 years, sooner as needed.  She is satisfied with the plan.  Total time 10 minutes, more than 50% spent counseling and/or coordinating care.

## 2021-10-15 NOTE — LETTER
10/15/2021         RE: Amena Rosario  [address]  [place] [address]        Dear Colleague,    Thank you for referring your patient, Amena Rosario, to the [facility]. Please see a copy of my visit note below.    The patient is a 50-year-old female.  In October 2017 she had removal of a huge posterior fossa meningioma.  Pathology was grade 1 meningioma.  She had a new scan this month.  There is no residual or recurrent tumor.  I did show the pictures to the patient.  She is happy with the outcome.  Plan new scan in 2 years, sooner as needed.  She is satisfied with the plan.  Total time 10 minutes, more than 50% spent counseling and/or coordinating care.      Again, thank you for allowing me to participate in the care of your patient.        Sincerely,        Rey Luna MD

## 2021-11-03 DIAGNOSIS — J45.40 MODERATE PERSISTENT ASTHMA WITHOUT COMPLICATION: ICD-10-CM

## 2021-11-05 RX ORDER — ALBUTEROL SULFATE 90 UG/1
AEROSOL, METERED RESPIRATORY (INHALATION)
Qty: 8.5 G | Refills: 0 | Status: SHIPPED | OUTPATIENT
Start: 2021-11-05 | End: 2021-11-17

## 2021-11-12 NOTE — PATIENT INSTRUCTIONS
Black Cohosh - natural supplement for hot flashes, OTC      Preventive Health Recommendations  Female Ages 50 - 64    Yearly exam: See your health care provider every year in order to  o Review health changes.   o Discuss preventive care.    o Review your medicines if your doctor has prescribed any.      Get a Pap test every three years (unless you have an abnormal result and your provider advises testing more often).    If you get Pap tests with HPV test, you only need to test every 5 years, unless you have an abnormal result.     You do not need a Pap test if your uterus was removed (hysterectomy) and you have not had cancer.    You should be tested each year for STDs (sexually transmitted diseases) if you're at risk.     Have a mammogram every 1 to 2 years.    Have a colonoscopy at age 50, or have a yearly FIT test (stool test). These exams screen for colon cancer.      Have a cholesterol test every 5 years, or more often if advised.    Have a diabetes test (fasting glucose) every three years. If you are at risk for diabetes, you should have this test more often.     If you are at risk for osteoporosis (brittle bone disease), think about having a bone density scan (DEXA).    Shots: Get a flu shot each year. Get a tetanus shot every 10 years.    Nutrition:     Eat at least 5 servings of fruits and vegetables each day.    Eat whole-grain bread, whole-wheat pasta and brown rice instead of white grains and rice.    Get adequate Calcium and Vitamin D.     Lifestyle    Exercise at least 150 minutes a week (30 minutes a day, 5 days a week). This will help you control your weight and prevent disease.    Limit alcohol to one drink per day.    No smoking.     Wear sunscreen to prevent skin cancer.     See your dentist every six months for an exam and cleaning.    See your eye doctor every 1 to 2 years.

## 2021-11-12 NOTE — PROGRESS NOTES
"     SUBJECTIVE:   CC: Amena Rosario is an 50 year old woman who presents for preventive health visit.       Patient has been advised of split billing requirements and indicates understanding: Yes  Healthy Habits:     In general, how would you rate your overall health?  Fair    Frequency of exercise:  2-3 days/week    Duration of exercise:  15-30 minutes    Do you usually eat at least 4 servings of fruit and vegetables a day, include whole grains    & fiber and avoid regularly eating high fat or \"junk\" foods?  Yes    Taking medications regularly:  Yes    Medication side effects:  Other    Ability to successfully perform activities of daily living:  No assistance needed    Home Safety:  Lack of handrails on stairs and lighting is poor    Hearing Impairment:  No hearing concerns    In the past 6 months, have you been bothered by leaking of urine?  No    In general, how would you rate your overall mental or emotional health?  Fair      PHQ-2 Total Score: 1    Additional concerns today:  Yes        PROBLEMS TO ADD ON...  Last time patient came in for appointment she was given a medication for hot flashes but it didn't work. Patient wants to know if she'll be charged for it. Wants the natural medication for hot flashes    Due for routine med check, but wants to come back for that next month    -------------------------------------    Today's PHQ-2 Score:   PHQ-2 ( 1999 Pfizer) 11/17/2021   Q1: Little interest or pleasure in doing things 0   Q2: Feeling down, depressed or hopeless 1   PHQ-2 Score 1   Q1: Little interest or pleasure in doing things Not at all   Q2: Feeling down, depressed or hopeless Several days   PHQ-2 Score 1       Abuse: Current or Past (Physical, Sexual or Emotional) - No  Do you feel safe in your environment? Yes    Have you ever done Advance Care Planning? (For example, a Health Directive, POLST, or a discussion with a medical provider or your loved ones about your wishes): No, advance care " planning information given to patient to review.  Patient plans to discuss their wishes with loved ones or provider.      Social History     Tobacco Use     Smoking status: Current Some Day Smoker     Smokeless tobacco: Never Used     Tobacco comment: E-CIG   Substance Use Topics     Alcohol use: Not Currently       Alcohol Use 11/17/2021   Prescreen: >3 drinks/day or >7 drinks/week? No     Reviewed orders with patient.  Reviewed health maintenance and updated orders accordingly - Yes  Labs reviewed in EPIC    Breast Cancer Screening:  Any new diagnosis of family breast, ovarian, or bowel cancer? No    FHS-7: No flowsheet data found.    Mammogram Screening: Recommended annual mammography  Pertinent mammograms are reviewed under the imaging tab.    History of abnormal Pap smear: YES - updated in Problem List and Health Maintenance accordingly  PAP / HPV Latest Ref Rng & Units 7/27/2020   PAP (Historical) - NIL   HPV16 NEG:Negative Negative   HPV18 NEG:Negative Negative   HRHPV NEG:Negative Positive(A)     Reviewed and updated as needed this visit by clinical staff                Reviewed and updated as needed this visit by Provider               Past Medical History:   Diagnosis Date     Cervical high risk HPV (human papillomavirus) test positive 07/27/2020    see problem list        Review of Systems   Constitutional: Negative for chills and fever.   HENT: Positive for congestion and ear pain. Negative for hearing loss and sore throat.    Eyes: Negative for pain and visual disturbance.   Respiratory: Positive for cough and shortness of breath.    Cardiovascular: Positive for palpitations. Negative for chest pain and peripheral edema.   Gastrointestinal: Positive for heartburn. Negative for abdominal pain, constipation, diarrhea, hematochezia and nausea.   Breasts:  Positive for tenderness. Negative for breast mass and discharge.   Genitourinary: Positive for urgency. Negative for dysuria, frequency, genital sores,  "hematuria, pelvic pain, vaginal bleeding and vaginal discharge.   Musculoskeletal: Negative for arthralgias, joint swelling and myalgias.   Skin: Negative for rash.   Neurological: Positive for dizziness. Negative for weakness, headaches and paresthesias.   Psychiatric/Behavioral: Positive for mood changes. The patient is nervous/anxious.         OBJECTIVE:   /74   Pulse 70   Temp 98.3  F (36.8  C) (Tympanic)   Resp 14   Ht 1.544 m (5' 0.79\")   Wt 66.5 kg (146 lb 9.6 oz)   LMP 09/29/2021 (Exact Date)   SpO2 98%   BMI 27.89 kg/m    Physical Exam  GENERAL: healthy, alert and no distress  EYES: Eyes grossly normal to inspection, PERRL and conjunctivae and sclerae normal  HENT: ear canals and TM's normal, nose and mouth without ulcers or lesions  NECK: no adenopathy, no asymmetry, masses, or scars and thyroid normal to palpation  RESP: lungs clear to auscultation - no rales, rhonchi or wheezes  BREAST: normal without masses, tenderness or nipple discharge and no palpable axillary masses or adenopathy  CV: regular rates and rhythm, normal S1 S2, no S3 or S4 and no murmur, click or rub  ABDOMEN: soft, nontender, no hepatosplenomegaly, no masses and bowel sounds normal   (female): normal female external genitalia, normal urethral meatus, vaginal mucosa pink, moist, well rugated, and normal cervix  MS: no gross musculoskeletal defects noted, no edema  SKIN: no suspicious lesions or rashes  NEURO: Normal strength and tone, mentation intact and speech normal  PSYCH: mentation appears normal, affect normal/bright    ASSESSMENT/PLAN:       ICD-10-CM    1. Routine general medical examination at a health care facility  Z00.00 Pap Screen with HPV - recommended age 30 - 65 years   2. Screen for colon cancer  Z12.11 Fecal colorectal cancer screen (FIT)   3. Screening for HIV (human immunodeficiency virus)  Z11.4 HIV Antigen Antibody Combo   4. Need for hepatitis C screening test  Z11.59 Hepatitis C Screen Reflex to " "HCV RNA Quant and Genotype   5. Diabetes mellitus screening  Z13.1    6. Encounter for screening mammogram for breast cancer  Z12.31 MA SCREENING DIGITAL BILAT - Future  (s+30)   7. Lipid screening  Z13.220 Lipid panel reflex to direct LDL Fasting   8. Cervical high risk HPV (human papillomavirus) test positive  R87.810 Pap Screen with HPV - recommended age 30 - 65 years   9. Benign essential hypertension  I10 Basic metabolic panel  (Ca, Cl, CO2, Creat, Gluc, K, Na, BUN)     Albumin Random Urine Quantitative with Creat Ratio     lisinopril (ZESTRIL) 10 MG tablet   10. Other specified hypothyroidism  E03.8 TSH with free T4 reflex   11. Moderate persistent asthma without complication  J45.40 albuterol (PROAIR HFA/PROVENTIL HFA/VENTOLIN HFA) 108 (90 Base) MCG/ACT inhaler     fluticasone-salmeterol (ADVAIR) 250-50 MCG/DOSE inhaler   12. Gastroesophageal reflux disease with esophagitis, unspecified whether hemorrhage  K21.00 omeprazole (PRILOSEC) 40 MG DR capsule   13. Nausea  R11.0 ondansetron (ZOFRAN-ODT) 4 MG ODT tab   14. Skin lesion  L98.9 Adult Dermatology Referral   15. Seasonal allergies  J30.2 olopatadine (PATANOL) 0.1 % ophthalmic solution     loratadine (CLARITIN) 10 MG tablet   16. Bipolar 1 disorder (H)  F31.9    17. High risk medication use  Z79.899 Hemoglobin A1c       Screenings discussed today.    She plans to follow up in the next month for a med check and routine/fasting labs. She was given 1-month refills to get her through to her next appointment.    Referral to derm per patient request      Patient has been advised of split billing requirements and indicates understanding: Yes  COUNSELING:  Reviewed preventive health counseling, as reflected in patient instructions    Estimated body mass index is 27.89 kg/m  as calculated from the following:    Height as of this encounter: 1.544 m (5' 0.79\").    Weight as of this encounter: 66.5 kg (146 lb 9.6 oz).    She reports that she has been smoking. She has " never used smokeless tobacco.  Tobacco Cessation Action Plan      Counseling Resources:  ATP IV Guidelines  Pooled Cohorts Equation Calculator  Breast Cancer Risk Calculator  BRCA-Related Cancer Risk Assessment: FHS-7 Tool  FRAX Risk Assessment  ICSI Preventive Guidelines  Dietary Guidelines for Americans, 2010  USDA's MyPlate  ASA Prophylaxis  Lung CA Screening    [facility]

## 2021-11-17 ENCOUNTER — OFFICE VISIT (OUTPATIENT)
Dept: FAMILY MEDICINE | Facility: CLINIC | Age: 51
End: 2021-11-17
Payer: COMMERCIAL

## 2021-11-17 VITALS
RESPIRATION RATE: 14 BRPM | HEIGHT: 61 IN | DIASTOLIC BLOOD PRESSURE: 74 MMHG | BODY MASS INDEX: 27.68 KG/M2 | HEART RATE: 70 BPM | SYSTOLIC BLOOD PRESSURE: 121 MMHG | OXYGEN SATURATION: 98 % | WEIGHT: 146.6 LBS | TEMPERATURE: 98.3 F

## 2021-11-17 DIAGNOSIS — F31.9 BIPOLAR 1 DISORDER (H): ICD-10-CM

## 2021-11-17 DIAGNOSIS — J30.2 SEASONAL ALLERGIES: ICD-10-CM

## 2021-11-17 DIAGNOSIS — Z12.31 ENCOUNTER FOR SCREENING MAMMOGRAM FOR BREAST CANCER: ICD-10-CM

## 2021-11-17 DIAGNOSIS — Z13.220 LIPID SCREENING: ICD-10-CM

## 2021-11-17 DIAGNOSIS — J45.40 MODERATE PERSISTENT ASTHMA WITHOUT COMPLICATION: ICD-10-CM

## 2021-11-17 DIAGNOSIS — L98.9 SKIN LESION: ICD-10-CM

## 2021-11-17 DIAGNOSIS — Z11.59 NEED FOR HEPATITIS C SCREENING TEST: ICD-10-CM

## 2021-11-17 DIAGNOSIS — R11.0 NAUSEA: ICD-10-CM

## 2021-11-17 DIAGNOSIS — I10 BENIGN ESSENTIAL HYPERTENSION: ICD-10-CM

## 2021-11-17 DIAGNOSIS — K21.00 GASTROESOPHAGEAL REFLUX DISEASE WITH ESOPHAGITIS, UNSPECIFIED WHETHER HEMORRHAGE: ICD-10-CM

## 2021-11-17 DIAGNOSIS — Z13.1 DIABETES MELLITUS SCREENING: ICD-10-CM

## 2021-11-17 DIAGNOSIS — R87.810 CERVICAL HIGH RISK HPV (HUMAN PAPILLOMAVIRUS) TEST POSITIVE: ICD-10-CM

## 2021-11-17 DIAGNOSIS — Z79.899 HIGH RISK MEDICATION USE: ICD-10-CM

## 2021-11-17 DIAGNOSIS — Z12.11 SCREEN FOR COLON CANCER: ICD-10-CM

## 2021-11-17 DIAGNOSIS — Z00.00 ROUTINE GENERAL MEDICAL EXAMINATION AT A HEALTH CARE FACILITY: Primary | ICD-10-CM

## 2021-11-17 DIAGNOSIS — Z11.4 SCREENING FOR HIV (HUMAN IMMUNODEFICIENCY VIRUS): ICD-10-CM

## 2021-11-17 DIAGNOSIS — E03.8 OTHER SPECIFIED HYPOTHYROIDISM: ICD-10-CM

## 2021-11-17 PROCEDURE — 88141 CYTOPATH C/V INTERPRET: CPT | Performed by: PATHOLOGY

## 2021-11-17 PROCEDURE — 88175 CYTOPATH C/V AUTO FLUID REDO: CPT | Performed by: PHYSICIAN ASSISTANT

## 2021-11-17 PROCEDURE — 99396 PREV VISIT EST AGE 40-64: CPT | Performed by: PHYSICIAN ASSISTANT

## 2021-11-17 PROCEDURE — 87624 HPV HI-RISK TYP POOLED RSLT: CPT | Performed by: PHYSICIAN ASSISTANT

## 2021-11-17 RX ORDER — LISINOPRIL 10 MG/1
10 TABLET ORAL DAILY
Qty: 30 TABLET | Refills: 0 | Status: SHIPPED | OUTPATIENT
Start: 2021-11-17 | End: 2022-02-15

## 2021-11-17 RX ORDER — ONDANSETRON 4 MG/1
4-8 TABLET, ORALLY DISINTEGRATING ORAL EVERY 6 HOURS PRN
Qty: 30 TABLET | Refills: 0 | Status: SHIPPED | OUTPATIENT
Start: 2021-11-17 | End: 2022-03-02

## 2021-11-17 RX ORDER — OLOPATADINE HYDROCHLORIDE 1 MG/ML
1 SOLUTION/ DROPS OPHTHALMIC 2 TIMES DAILY
Qty: 5 ML | Refills: 0 | Status: SHIPPED | OUTPATIENT
Start: 2021-11-17 | End: 2022-03-30

## 2021-11-17 RX ORDER — ALBUTEROL SULFATE 90 UG/1
1-2 AEROSOL, METERED RESPIRATORY (INHALATION) EVERY 4 HOURS PRN
Qty: 8.5 G | Refills: 0 | Status: SHIPPED | OUTPATIENT
Start: 2021-11-17 | End: 2021-12-19

## 2021-11-17 RX ORDER — OMEPRAZOLE 40 MG/1
40 CAPSULE, DELAYED RELEASE ORAL DAILY
Qty: 30 CAPSULE | Refills: 0 | Status: SHIPPED | OUTPATIENT
Start: 2021-11-17 | End: 2022-04-26

## 2021-11-17 RX ORDER — LORATADINE 10 MG/1
10 TABLET ORAL DAILY
Qty: 30 TABLET | Refills: 0 | Status: SHIPPED | OUTPATIENT
Start: 2021-11-17 | End: 2022-01-07

## 2021-11-17 ASSESSMENT — ENCOUNTER SYMPTOMS
NAUSEA: 0
ARTHRALGIAS: 0
FREQUENCY: 0
NERVOUS/ANXIOUS: 1
HEMATOCHEZIA: 0
BREAST MASS: 0
PARESTHESIAS: 0
EYE PAIN: 0
DIARRHEA: 0
CONSTIPATION: 0
SHORTNESS OF BREATH: 1
HEARTBURN: 1
HEMATURIA: 0
DIZZINESS: 1
JOINT SWELLING: 0
ABDOMINAL PAIN: 0
HEADACHES: 0
COUGH: 1
PALPITATIONS: 1
DYSURIA: 0
MYALGIAS: 0
CHILLS: 0
SORE THROAT: 0
FEVER: 0
WEAKNESS: 0

## 2021-11-17 ASSESSMENT — MIFFLIN-ST. JEOR: SCORE: 1208.96

## 2021-11-17 ASSESSMENT — ACTIVITIES OF DAILY LIVING (ADL): CURRENT_FUNCTION: NO ASSISTANCE NEEDED

## 2021-11-18 ASSESSMENT — ASTHMA QUESTIONNAIRES: ACT_TOTALSCORE: 17

## 2021-11-22 LAB
BKR LAB AP GYN ADEQUACY: ABNORMAL
BKR LAB AP GYN INTERPRETATION: ABNORMAL
BKR LAB AP HPV REFLEX: ABNORMAL
BKR LAB AP PREVIOUS ABNL DX: ABNORMAL
BKR LAB AP PREVIOUS ABNORMAL: ABNORMAL
PATH REPORT.COMMENTS IMP SPEC: ABNORMAL
PATH REPORT.COMMENTS IMP SPEC: ABNORMAL
PATH REPORT.RELEVANT HX SPEC: ABNORMAL

## 2021-11-23 DIAGNOSIS — J45.40 MODERATE PERSISTENT ASTHMA WITHOUT COMPLICATION: ICD-10-CM

## 2021-11-24 RX ORDER — FLUTICASONE PROPIONATE 50 MCG
1-2 SPRAY, SUSPENSION (ML) NASAL DAILY
Qty: 16 G | Refills: 3 | Status: SHIPPED | OUTPATIENT
Start: 2021-11-24 | End: 2022-08-01

## 2021-11-26 ENCOUNTER — PATIENT OUTREACH (OUTPATIENT)
Dept: FAMILY MEDICINE | Facility: CLINIC | Age: 51
End: 2021-11-26
Payer: COMMERCIAL

## 2021-11-26 DIAGNOSIS — R87.810 CERVICAL HIGH RISK HPV (HUMAN PAPILLOMAVIRUS) TEST POSITIVE: ICD-10-CM

## 2021-11-26 LAB
HUMAN PAPILLOMA VIRUS 16 DNA: NEGATIVE
HUMAN PAPILLOMA VIRUS 18 DNA: NEGATIVE
HUMAN PAPILLOMA VIRUS FINAL DIAGNOSIS: NORMAL
HUMAN PAPILLOMA VIRUS OTHER HR: NEGATIVE

## 2021-12-16 DIAGNOSIS — J45.40 MODERATE PERSISTENT ASTHMA WITHOUT COMPLICATION: ICD-10-CM

## 2021-12-19 RX ORDER — ALBUTEROL SULFATE 90 UG/1
1-2 AEROSOL, METERED RESPIRATORY (INHALATION) EVERY 4 HOURS PRN
Qty: 8.5 G | Refills: 0 | Status: SHIPPED | OUTPATIENT
Start: 2021-12-19 | End: 2022-01-17

## 2021-12-19 NOTE — TELEPHONE ENCOUNTER
Prescription approved per [facility] Refill Protocol.  Jenna Goldman RN

## 2021-12-28 ENCOUNTER — LAB (OUTPATIENT)
Dept: LAB | Facility: CLINIC | Age: 51
End: 2021-12-28
Payer: COMMERCIAL

## 2021-12-28 DIAGNOSIS — E03.8 OTHER SPECIFIED HYPOTHYROIDISM: ICD-10-CM

## 2021-12-28 DIAGNOSIS — Z11.59 NEED FOR HEPATITIS C SCREENING TEST: ICD-10-CM

## 2021-12-28 DIAGNOSIS — Z13.220 LIPID SCREENING: ICD-10-CM

## 2021-12-28 DIAGNOSIS — Z79.899 HIGH RISK MEDICATION USE: ICD-10-CM

## 2021-12-28 DIAGNOSIS — Z11.4 SCREENING FOR HIV (HUMAN IMMUNODEFICIENCY VIRUS): ICD-10-CM

## 2021-12-28 DIAGNOSIS — I10 BENIGN ESSENTIAL HYPERTENSION: ICD-10-CM

## 2021-12-28 LAB
ANION GAP SERPL CALCULATED.3IONS-SCNC: 4 MMOL/L (ref 3–14)
BUN SERPL-MCNC: 17 MG/DL (ref 7–30)
CALCIUM SERPL-MCNC: 8.8 MG/DL (ref 8.5–10.1)
CHLORIDE BLD-SCNC: 105 MMOL/L (ref 94–109)
CHOLEST SERPL-MCNC: 197 MG/DL
CO2 SERPL-SCNC: 28 MMOL/L (ref 20–32)
CREAT SERPL-MCNC: 0.8 MG/DL (ref 0.52–1.04)
CREAT UR-MCNC: 128 MG/DL
FASTING STATUS PATIENT QL REPORTED: YES
GFR SERPL CREATININE-BSD FRML MDRD: 88 ML/MIN/1.73M2
GLUCOSE BLD-MCNC: 94 MG/DL (ref 70–99)
HBA1C MFR BLD: 5.5 % (ref 0–5.6)
HDLC SERPL-MCNC: 75 MG/DL
HIV 1+2 AB+HIV1 P24 AG SERPL QL IA: NONREACTIVE
LDLC SERPL CALC-MCNC: 105 MG/DL
MICROALBUMIN UR-MCNC: 6 MG/L
MICROALBUMIN/CREAT UR: 4.69 MG/G CR (ref 0–25)
NONHDLC SERPL-MCNC: 122 MG/DL
POTASSIUM BLD-SCNC: 3.9 MMOL/L (ref 3.4–5.3)
SODIUM SERPL-SCNC: 137 MMOL/L (ref 133–144)
T4 FREE SERPL-MCNC: 0.78 NG/DL (ref 0.76–1.46)
TRIGL SERPL-MCNC: 83 MG/DL
TSH SERPL DL<=0.005 MIU/L-ACNC: 4.37 MU/L (ref 0.4–4)

## 2021-12-28 PROCEDURE — 83036 HEMOGLOBIN GLYCOSYLATED A1C: CPT

## 2021-12-28 PROCEDURE — 82043 UR ALBUMIN QUANTITATIVE: CPT

## 2021-12-28 PROCEDURE — 87902 NFCT AGT GNTYP ALYS HEP C: CPT

## 2021-12-28 PROCEDURE — 86803 HEPATITIS C AB TEST: CPT

## 2021-12-28 PROCEDURE — 80048 BASIC METABOLIC PNL TOTAL CA: CPT

## 2021-12-28 PROCEDURE — 80061 LIPID PANEL: CPT

## 2021-12-28 PROCEDURE — 84439 ASSAY OF FREE THYROXINE: CPT

## 2021-12-28 PROCEDURE — 84443 ASSAY THYROID STIM HORMONE: CPT

## 2021-12-28 PROCEDURE — 36415 COLL VENOUS BLD VENIPUNCTURE: CPT

## 2021-12-28 PROCEDURE — 87389 HIV-1 AG W/HIV-1&-2 AB AG IA: CPT

## 2021-12-28 NOTE — LETTER
December 30, 2021      Amena Rosario  [address]   [place] [address]        Dear ,    We are writing to inform you of your test results.    Your HIV and Hep C screening/RNA results are negative.   TSH is ever so slightly elevated, but your T4 is in normal range.   Electrolytes and kidney function are normal.     Your A1c is normal - no signs of diabetes.   Your LDL (bad) cholesterol is at goal, close to 100.     The below information is a cardiac risk score and helps determine which patients would benefit from cholesterol lowering medication. Your score doesn't indicate the need for medication yet. We consider statin therapy when the 10 year risk score is 7.5 % or higher.     The 10-year ASCVD risk score (Monica DE PAZ Jr., et al., 2013) is: 3.6%     Values used to calculate the score:       Age: 51 years       Sex: Female       Is Non- : No       Diabetic: No       Tobacco smoker: Yes       Systolic Blood Pressure: 121 mmHg       Is BP treated: Yes       HDL Cholesterol: 75 mg/dL       Total Cholesterol: 197 mg/dL      Resulted Orders   Hemoglobin A1c   Result Value Ref Range    Hemoglobin A1C 5.5 0.0 - 5.6 %      Comment:      Normal <5.7%   Prediabetes 5.7-6.4%    Diabetes 6.5% or higher     Note: Adopted from ADA consensus guidelines.   Albumin Random Urine Quantitative with Creat Ratio   Result Value Ref Range    Creatinine Urine mg/dL 128 mg/dL    Albumin Urine mg/L 6 mg/L    Albumin Urine mg/g Cr 4.69 0.00 - 25.00 mg/g Cr   Basic metabolic panel  (Ca, Cl, CO2, Creat, Gluc, K, Na, BUN)   Result Value Ref Range    Sodium 137 133 - 144 mmol/L    Potassium 3.9 3.4 - 5.3 mmol/L    Chloride 105 94 - 109 mmol/L    Carbon Dioxide (CO2) 28 20 - 32 mmol/L    Anion Gap 4 3 - 14 mmol/L    Urea Nitrogen 17 7 - 30 mg/dL    Creatinine 0.80 0.52 - 1.04 mg/dL    Calcium 8.8 8.5 - 10.1 mg/dL    Glucose 94 70 - 99 mg/dL    GFR Estimate 88 >60 mL/min/1.73m2      Comment:      Effective  December 21, 2021 eGFRcr in adults is calculated using the 2021 CKD-EPI creatinine equation which includes age and gender (Megan et al., NEJM, DOI: 10.1056/FRTSta7964213)   TSH with free T4 reflex   Result Value Ref Range    TSH 4.37 (H) 0.40 - 4.00 mU/L   Lipid panel reflex to direct LDL Fasting   Result Value Ref Range    Cholesterol 197 <200 mg/dL    Triglycerides 83 <150 mg/dL    Direct Measure HDL 75 >=50 mg/dL    LDL Cholesterol Calculated 105 (H) <=100 mg/dL    Non HDL Cholesterol 122 <130 mg/dL    Patient Fasting > 8hrs? Yes     Narrative    Cholesterol  Desirable:  <200 mg/dL    Triglycerides  Normal:  Less than 150 mg/dL  Borderline High:  150-199 mg/dL  High:  200-499 mg/dL  Very High:  Greater than or equal to 500 mg/dL    Direct Measure HDL  Female:  Greater than or equal to 50 mg/dL   Male:  Greater than or equal to 40 mg/dL    LDL Cholesterol  Desirable:  <100mg/dL  Above Desirable:  100-129 mg/dL   Borderline High:  130-159 mg/dL   High:  160-189 mg/dL   Very High:  >= 190 mg/dL    Non HDL Cholesterol  Desirable:  130 mg/dL  Above Desirable:  130-159 mg/dL  Borderline High:  160-189 mg/dL  High:  190-219 mg/dL  Very High:  Greater than or equal to 220 mg/dL   Hepatitis C Screen Reflex to HCV RNA Quant and Genotype   Result Value Ref Range    Hepatitis C Antibody Reactive (A) Nonreactive      Comment:      A reactive result indicates one of the following   1) Current HCV infection   2) Past HCV infection that has resolved or   3) False positivity.     The CDC recommends that a reactive result should be followed by Nucleic acid testing for HCV RNA. If HCV RNA is detected, that indicates current HCV infection.   If HCV RNA is not detected, that indicates either past, resolved HCV infection, or false HCV antibody positivity.    Narrative    Assay performance characteristics have not been established for newborns, infants, and children.   HIV Antigen Antibody Combo   Result Value Ref Range    HIV Antigen  Antibody Combo Nonreactive Nonreactive      Comment:      HIV-1 p24 Ag & HIV-1/HIV-2 Ab Not Detected   T4 free   Result Value Ref Range    Free T4 0.78 0.76 - 1.46 ng/dL   Hepatitis C RNA, Quantitative by PCR with Confirmatory Reflex to Genotyping   Result Value Ref Range    Hepatitis C RNA IU/mL Not Detected Not Detected IU/mL    Narrative    The LETY AmpliPrep/LETY TaqMan HCV test is a FDA-approved in vitro nucleic acid amplification test for the quantitation of HCV DNA in human plasma (EDTA plasma) or serum using the LETY AmpliPrep instrument for automated viral nucleic acid extraction and the LETY TaqMan for the automated real-time PCR amplification and detection of viral nucleic acid target. Titer results are reported in International Units/mL (IU/mL) using the 1st WHO International standard for HBV for nucleic acid amplification assays.       If you have any questions or concerns, please call the clinic at the number listed above.       Sincerely,      April Bergeron PA-C/tiffanie

## 2021-12-29 ENCOUNTER — LAB (OUTPATIENT)
Dept: LAB | Facility: CLINIC | Age: 51
End: 2021-12-29
Payer: COMMERCIAL

## 2021-12-29 DIAGNOSIS — Z12.11 SCREEN FOR COLON CANCER: ICD-10-CM

## 2021-12-29 LAB
HCV AB SERPL QL IA: REACTIVE
HCV RNA SERPL NAA+PROBE-ACNC: NOT DETECTED IU/ML

## 2021-12-29 PROCEDURE — 82274 ASSAY TEST FOR BLOOD FECAL: CPT

## 2021-12-30 NOTE — RESULT ENCOUNTER NOTE
Please send the following letter to the patient:    Amena,    Your HIV and Hep C screening/RNA results are negative.  TSH is ever so slightly elevated, but your T4 is in normal range.  Electrolytes and kidney function are normal.    Your A1c is normal - no signs of diabetes.  Your LDL (bad) cholesterol is at goal, close to 100.    The below information is a cardiac risk score and helps determine which patients would benefit from cholesterol lowering medication. Your score doesn't indicate the need for medication yet. We consider statin therapy when the 10 year risk score is 7.5 % or higher.     The 10-year ASCVD risk score (Monica ZANDRA Jr., et al., 2013) is: 3.6%    Values used to calculate the score:      Age: 51 years      Sex: Female      Is Non- : No      Diabetic: No      Tobacco smoker: Yes      Systolic Blood Pressure: 121 mmHg      Is BP treated: Yes      HDL Cholesterol: 75 mg/dL      Total Cholesterol: 197 mg/dL     Please call me with any questions or concerns.          April Bergerno PA-C

## 2021-12-31 LAB — HEMOCCULT STL QL IA: NEGATIVE

## 2022-01-04 DIAGNOSIS — J45.40 MODERATE PERSISTENT ASTHMA WITHOUT COMPLICATION: ICD-10-CM

## 2022-01-04 DIAGNOSIS — J30.2 SEASONAL ALLERGIES: ICD-10-CM

## 2022-01-05 NOTE — TELEPHONE ENCOUNTER
Called patient. No answer. Left message to call back.    Darshana Melton, NOAH on 1/5/2022 at 3:02 PM

## 2022-01-07 RX ORDER — LORATADINE 10 MG/1
10 TABLET ORAL DAILY
Qty: 30 TABLET | Refills: 0 | Status: SHIPPED | OUTPATIENT
Start: 2022-01-07 | End: 2022-02-28

## 2022-01-07 NOTE — TELEPHONE ENCOUNTER
Unable to complete ACT via phone due to copyright. Please approve/deny and staff will send letter with ACT for patient to return. Yamileth Keene MA

## 2022-01-16 DIAGNOSIS — J45.40 MODERATE PERSISTENT ASTHMA WITHOUT COMPLICATION: ICD-10-CM

## 2022-01-18 RX ORDER — ALBUTEROL SULFATE 90 UG/1
1-2 AEROSOL, METERED RESPIRATORY (INHALATION) EVERY 4 HOURS PRN
Qty: 8.5 G | Refills: 0 | Status: SHIPPED | OUTPATIENT
Start: 2022-01-18 | End: 2022-02-12

## 2022-01-18 NOTE — TELEPHONE ENCOUNTER
Routing refill request to provider for review/approval because:  Labs out of range:  Act  ACT and ATAQ Scores 11/17/2021   ACT TOTAL SCORE (Goal Greater than or Equal to 20) 17

## 2022-02-10 DIAGNOSIS — J45.40 MODERATE PERSISTENT ASTHMA WITHOUT COMPLICATION: ICD-10-CM

## 2022-02-12 NOTE — TELEPHONE ENCOUNTER
"Routing refill request to provider for review/approval because:  Labs out of range:  ACT  ACT and ATAQ Scores 11/17/2021   ACT TOTAL SCORE (Goal Greater than or Equal to 20) 17     Patient needs to be seen because:  Due for follow up    Med pended with reminder    Requested Prescriptions   Pending Prescriptions Disp Refills     albuterol (PROAIR HFA/PROVENTIL HFA/VENTOLIN HFA) 108 (90 Base) MCG/ACT inhaler [Pharmacy Med Name: Albuterol Sulfate HFA Inhalation Aerosol Solution 108 (90 Base) MCG/ACT] 8.5 g 0     Sig: Inhale 1-2 puffs into the lungs every 4 hours as needed for shortness of breath / dyspnea or wheezing       Asthma Maintenance Inhalers - Anticholinergics Failed - 2/10/2022  9:47 AM        Failed - Asthma control assessment score within normal limits in last 6 months     Please review ACT score.           Passed - Patient is age 12 years or older        Passed - Medication is active on med list        Passed - Recent (6 mo) or future (30 days) visit within the authorizing provider's specialty     Patient had office visit in the last 6 months or has a visit in the next 30 days with authorizing provider or within the authorizing provider's specialty.  See \"Patient Info\" tab in inbasket, or \"Choose Columns\" in Meds & Orders section of the refill encounter.           Short-Acting Beta Agonist Inhalers Protocol  Failed - 2/10/2022  9:47 AM        Failed - Asthma control assessment score within normal limits in last 6 months     Please review ACT score.           Passed - Patient is age 12 or older        Passed - Medication is active on med list        Passed - Recent (6 mo) or future (30 days) visit within the authorizing provider's specialty     Patient had office visit in the last 6 months or has a visit in the next 30 days with authorizing provider or within the authorizing provider's specialty.  See \"Patient Info\" tab in inbasket, or \"Choose Columns\" in Meds & Orders section of the refill encounter.     "

## 2022-02-14 RX ORDER — ALBUTEROL SULFATE 90 UG/1
1-2 AEROSOL, METERED RESPIRATORY (INHALATION) EVERY 4 HOURS PRN
Qty: 8.5 G | Refills: 0 | Status: SHIPPED | OUTPATIENT
Start: 2022-02-14 | End: 2022-03-04

## 2022-02-15 ENCOUNTER — TELEPHONE (OUTPATIENT)
Dept: FAMILY MEDICINE | Facility: CLINIC | Age: 52
End: 2022-02-15
Payer: COMMERCIAL

## 2022-02-15 DIAGNOSIS — I10 BENIGN ESSENTIAL HYPERTENSION: ICD-10-CM

## 2022-02-15 NOTE — LETTER
[facility]   [address] [facility]  [place]  [address] [phone]        March 1, 2022    Amena Rosario  [address]   [place] [address]        Dear Amena,    We have tried multiple times to reach you but have been unsuccessful.  Please call our clinic at phone [phone] as soon as possible to schedule an appointment with   April Bergeron PA-C for a follow up on your medications.  This appointment is needed for any additional refills to be approved.    Sincerely,    Andreea GALLOWAY  Clinic Unit Coordinator  [facility]

## 2022-02-22 DIAGNOSIS — J30.2 SEASONAL ALLERGIES: ICD-10-CM

## 2022-02-22 DIAGNOSIS — R11.0 NAUSEA: ICD-10-CM

## 2022-02-24 ENCOUNTER — ANCILLARY PROCEDURE (OUTPATIENT)
Dept: MAMMOGRAPHY | Facility: CLINIC | Age: 52
End: 2022-02-24
Attending: PHYSICIAN ASSISTANT
Payer: COMMERCIAL

## 2022-02-24 DIAGNOSIS — Z12.31 ENCOUNTER FOR SCREENING MAMMOGRAM FOR BREAST CANCER: ICD-10-CM

## 2022-02-24 PROCEDURE — 77067 SCR MAMMO BI INCL CAD: CPT | Mod: TC | Performed by: RADIOLOGY

## 2022-02-25 NOTE — TELEPHONE ENCOUNTER
Please call to schedule- already given manish.     Return in about 1 month (around 12/17/2021) for BP and thyroid follow up (med check), fasting labs.

## 2022-02-28 DIAGNOSIS — R11.0 NAUSEA: ICD-10-CM

## 2022-03-01 NOTE — TELEPHONE ENCOUNTER
Attempted to call patient at home/mobile number, no answer, left message on voicemail; patient was instructed to return call to [facility] at [phone].    Kenzie Vera RN  [facility]    
Called [phone] (home)     Did patient answer the phone: No, left a message on voicemail to return call to the [facility] at [phone].    Kathya RN,BSN  Triage Nurse  [facility]: [facility]        
Called patient at [phone]. Left message to return call to the clinic.    When patient returns call please assist with scheduling as directed by the provider below.     Nadeen Quintana RN    
LVM and doesn't look like the other clinic seen my note    Ghazal Moore on 3/1/2022 at 10:59 AM    
MICHAEL Moore on 2/23/2022 at 11:37 AM    
Mailed letter.Forward to nurse to sign order.  
Patient was last seen 11/17/21.    Plan per April Bergeron that day:    Instructions       Return in about 1 month (around 12/17/2021) for BP and thyroid follow up (med check), fasting labs.    Has not scheduled follow up, no labs done recently.    Attempted to call patient at home/mobile number, no answer, left message on voicemail; patient was instructed to return call to [facility] at [phone].          LDL Cholesterol Calculated   Date Value Ref Range Status   12/28/2021 105 (H) <=100 mg/dL Final     TSH   Date Value Ref Range Status   12/28/2021 4.37 (H) 0.40 - 4.00 mU/L Final   12/03/2019 6.20 (H) 0.40 - 4.00 mU/L Final     Per the 12/28/21 lab result note, I don't see that repeat fasting labs are due again?    BP Readings from Last 3 Encounters:   11/17/21 121/74   10/15/21 116/65   07/27/20 112/70     She had labs done in December as advised.    Routed back to PCP, can longer fill lisinopril be sent?    Kenzie Vera RN  [facility]      
Pharmacy requesting a 100 day Rx for Lisinopril. Patients insurance will cover 100 days for the same price as 30 days.  
Please schedule in person visit with fasting labs with me, route back for refill when scheduled  
Provider refilled med.  Per OV 11-17-21 Return in about 1 month (around 12/17/2021) for BP and thyroid follow up (med check), fasting labs.  Will send to provider to advise on larger quantity.  No follow up noted.    Pharmacy requesting 100 day rx.  Insurance pays same as 30 day.  
Routed to PCP to advise on below.    Kathya RN,BSN  Triage Nurse  [facility]: [facility]  Ph: [phone]      
She's going to [facility] today so left a message on the appt to see if they can help set her up for fasting labs    Ghazal Moore on 2/24/2022 at 8:55 AM    
hard copy, drawn during this pregnancy

## 2022-03-02 DIAGNOSIS — J45.40 MODERATE PERSISTENT ASTHMA WITHOUT COMPLICATION: ICD-10-CM

## 2022-03-02 RX ORDER — ONDANSETRON 4 MG/1
4-8 TABLET, ORALLY DISINTEGRATING ORAL EVERY 6 HOURS PRN
Qty: 30 TABLET | Refills: 0 | Status: SHIPPED | OUTPATIENT
Start: 2022-03-02 | End: 2022-06-20

## 2022-03-02 RX ORDER — LISINOPRIL 10 MG/1
10 TABLET ORAL DAILY
Qty: 90 TABLET | Refills: 1 | Status: SHIPPED | OUTPATIENT
Start: 2022-03-02 | End: 2022-09-09

## 2022-03-02 NOTE — TELEPHONE ENCOUNTER
Prescription approved per [facility] Refill Protocol.  Shefali Marc RN  [facility]

## 2022-03-04 ENCOUNTER — TELEPHONE (OUTPATIENT)
Dept: FAMILY MEDICINE | Facility: CLINIC | Age: 52
End: 2022-03-04
Payer: COMMERCIAL

## 2022-03-04 RX ORDER — ONDANSETRON 4 MG/1
4-8 TABLET, ORALLY DISINTEGRATING ORAL EVERY 6 HOURS PRN
Qty: 30 TABLET | Refills: 0 | OUTPATIENT
Start: 2022-03-04

## 2022-03-04 RX ORDER — LORATADINE 10 MG/1
10 TABLET ORAL DAILY
Qty: 30 TABLET | Refills: 0 | OUTPATIENT
Start: 2022-03-04

## 2022-03-04 RX ORDER — ALBUTEROL SULFATE 90 UG/1
1-2 AEROSOL, METERED RESPIRATORY (INHALATION) EVERY 4 HOURS PRN
Qty: 8.5 G | Refills: 0 | Status: SHIPPED | OUTPATIENT
Start: 2022-03-04 | End: 2022-03-29

## 2022-03-04 NOTE — TELEPHONE ENCOUNTER
Patient is requesting that her recent labs be forwarded to [facility] the [facility]. She states she just filled out a RUSTY earlier this week although the [facility] doesn't have it yet.   TC attempted to reach [facility] [place] to obtain the RUSTY but they are only open Mon-Thur. Will call [phone] at a later date.

## 2022-03-04 NOTE — TELEPHONE ENCOUNTER
"Patient is scheduled for 3/18/22 visit.    Refills ondansetron and loratadine were sent 3/2/22.    \"Refusal\" routed back to pharmacy with note.    Kenzie Vera RN  [facility]      "

## 2022-03-18 ENCOUNTER — VIRTUAL VISIT (OUTPATIENT)
Dept: FAMILY MEDICINE | Facility: CLINIC | Age: 52
End: 2022-03-18
Payer: COMMERCIAL

## 2022-03-18 DIAGNOSIS — D32.9 MENINGIOMA (H): ICD-10-CM

## 2022-03-18 DIAGNOSIS — E03.8 OTHER SPECIFIED HYPOTHYROIDISM: ICD-10-CM

## 2022-03-18 DIAGNOSIS — E22.2 SIADH (SYNDROME OF INAPPROPRIATE ADH PRODUCTION) (H): ICD-10-CM

## 2022-03-18 DIAGNOSIS — I10 BENIGN ESSENTIAL HYPERTENSION: Primary | ICD-10-CM

## 2022-03-18 DIAGNOSIS — F31.9 BIPOLAR 1 DISORDER (H): ICD-10-CM

## 2022-03-18 PROCEDURE — 99441 PR PHYSICIAN TELEPHONE EVALUATION 5-10 MIN: CPT | Performed by: PHYSICIAN ASSISTANT

## 2022-03-18 RX ORDER — LAMOTRIGINE 200 MG/1
200 TABLET ORAL DAILY
COMMUNITY
Start: 2022-03-18

## 2022-03-18 RX ORDER — LAMOTRIGINE 100 MG/1
200 TABLET ORAL AT BEDTIME
COMMUNITY
Start: 2022-03-02 | End: 2022-03-18

## 2022-03-18 NOTE — PROGRESS NOTES
"Amena is a 51 year old who is being evaluated via a billable telephone visit.      What phone number would you like to be contacted at? [phone]  How would you like to obtain your AVS? Mail a copy    Assessment & Plan     1. Benign essential hypertension    2. Other specified hypothyroidism    3. SIADH (syndrome of inappropriate ADH production) (H)    4. Meningioma (H)    5. Bipolar 1 disorder (H)        1) blood pressure at goal. Lisinopril has refills on it. Labs from last fall renewed.     2) TSH/T4 in good balance. No need to restart medication.    3) Electrolytes stable    4) Follows neurosurgery    5) Follows psych           BMI:   Estimated body mass index is 27.89 kg/m  as calculated from the following:    Height as of 11/17/21: 1.544 m (5' 0.79\").    Weight as of 11/17/21: 66.5 kg (146 lb 9.6 oz).       Return in about 8 months (around 11/18/2022) for your annual physical, a med check, fasting labs.    April Bergeron PA-C  [facility]      Dontrell Beckwith is a 51 year old who presents for the following health issues     HPI     Hypertension Follow-up      Do you check your blood pressure regularly outside of the clinic? Yes     Are you following a low salt diet? No    Are your blood pressures ever more than 140 on the top number (systolic) OR more   than 90 on the bottom number (diastolic), for example 140/90? No     The 10-year ASCVD risk score ([place] ZANDRA Jr., et al., 2013) is: 3.6%    Values used to calculate the score:      Age: 51 years      Sex: Female      Is Non- : No      Diabetic: No      Tobacco smoker: Yes      Systolic Blood Pressure: 121 mmHg      Is BP treated: Yes      HDL Cholesterol: 75 mg/dL      Total Cholesterol: 197 mg/dL     Hypothyroidism Follow-up      Since last visit, patient describes the following symptoms: dry skin      Review of Systems   Constitutional, cardiovascular, endocrine systems are negative, except as otherwise " noted.      Objective           Vitals:  No vitals were obtained today due to virtual visit.    Physical Exam   healthy, alert and no distress  PSYCH: Alert and oriented times 3; coherent speech, normal   rate and volume, able to articulate logical thoughts, able   to abstract reason, no tangential thoughts, no hallucinations   or delusions  Her affect is normal and pleasant  RESP: No cough, no audible wheezing, able to talk in full sentences  Remainder of exam unable to be completed due to telephone visits          Phone call duration: 9 minutes

## 2022-03-28 DIAGNOSIS — J30.2 SEASONAL ALLERGIES: ICD-10-CM

## 2022-03-30 RX ORDER — OLOPATADINE HYDROCHLORIDE 1 MG/ML
1 SOLUTION/ DROPS OPHTHALMIC 2 TIMES DAILY
Qty: 5 ML | Refills: 0 | Status: SHIPPED | OUTPATIENT
Start: 2022-03-30 | End: 2022-06-22

## 2022-04-24 DIAGNOSIS — K21.00 GASTROESOPHAGEAL REFLUX DISEASE WITH ESOPHAGITIS, UNSPECIFIED WHETHER HEMORRHAGE: ICD-10-CM

## 2022-04-26 RX ORDER — OMEPRAZOLE 40 MG/1
40 CAPSULE, DELAYED RELEASE ORAL DAILY
Qty: 90 CAPSULE | Refills: 0 | Status: SHIPPED | OUTPATIENT
Start: 2022-04-26 | End: 2022-08-25

## 2022-06-10 DIAGNOSIS — R06.2 WHEEZING: Primary | ICD-10-CM

## 2022-06-10 DIAGNOSIS — Z91.030 BEE ALLERGY STATUS: ICD-10-CM

## 2022-06-12 RX ORDER — EPINEPHRINE 0.3 MG/.3ML
INJECTION SUBCUTANEOUS
Qty: 2 EACH | Refills: 0 | Status: SHIPPED | OUTPATIENT
Start: 2022-06-12 | End: 2024-04-23

## 2022-06-21 DIAGNOSIS — J30.2 SEASONAL ALLERGIES: ICD-10-CM

## 2022-06-22 RX ORDER — OLOPATADINE HYDROCHLORIDE 1 MG/ML
1 SOLUTION/ DROPS OPHTHALMIC 2 TIMES DAILY
Qty: 5 ML | Refills: 1 | Status: SHIPPED | OUTPATIENT
Start: 2022-06-22 | End: 2023-03-21

## 2022-06-23 DIAGNOSIS — J45.40 MODERATE PERSISTENT ASTHMA WITHOUT COMPLICATION: ICD-10-CM

## 2022-06-23 NOTE — LETTER
June 29, 2022      Amena Rosario  [address]   [facility] [address]              Your healthcare team cares about your health. To provide you with the best care,   we have reviewed your chart and based on our findings, we see that you are due to:     - ASTHMA FOLLOW UP:  Complete and return the attached Asthma Control Test.  If your total score is 19 or less or you have been to the ER or urgent care for your asthma, then please schedule an asthma follow-up appointment.    If you have already completed these items, please contact the clinic via phone or   [facility] so your care team can review and update your records. Thank you for   choosing [facility] for your healthcare needs. For any questions,   concerns, or to schedule an appointment please contact the clinic.       Healthy Regards,      Your [facility] Care Team

## 2022-06-27 RX ORDER — ALBUTEROL SULFATE 90 UG/1
1-2 AEROSOL, METERED RESPIRATORY (INHALATION) EVERY 4 HOURS PRN
Qty: 8.5 G | Refills: 0 | Status: SHIPPED | OUTPATIENT
Start: 2022-06-27 | End: 2022-08-04

## 2022-06-27 NOTE — TELEPHONE ENCOUNTER
Medication is being filled for 1 time refill only due to:      NEEDS ACT- Please mail    ACT Total Scores 12/3/2019 11/17/2021   ACT TOTAL SCORE (Goal Greater than or Equal to 20) 25 17   In the past 12 months, how many times did you visit the emergency room for your asthma without being admitted to the hospital? 0 0   In the past 12 months, how many times were you hospitalized overnight because of your asthma? 0 0

## 2022-08-18 ENCOUNTER — TRANSFERRED RECORDS (OUTPATIENT)
Dept: HEALTH INFORMATION MANAGEMENT | Facility: CLINIC | Age: 52
End: 2022-08-18

## 2022-08-18 LAB
CREATININE (EXTERNAL): 0.93 MG/DL (ref 0.5–1.03)
GFR ESTIMATED (EXTERNAL): 73 ML/MIN/1.73M2
GLUCOSE (EXTERNAL): 118 MG/DL (ref 65–99)
POTASSIUM (EXTERNAL): 4 MMOL/L (ref 3.5–5.3)

## 2022-10-13 DIAGNOSIS — J45.40 MODERATE PERSISTENT ASTHMA WITHOUT COMPLICATION: ICD-10-CM

## 2022-10-17 RX ORDER — ALBUTEROL SULFATE 90 UG/1
1-2 AEROSOL, METERED RESPIRATORY (INHALATION) EVERY 4 HOURS PRN
Qty: 8.5 G | Refills: 0 | Status: SHIPPED | OUTPATIENT
Start: 2022-10-17 | End: 2022-11-17

## 2022-10-30 DIAGNOSIS — R11.0 NAUSEA: ICD-10-CM

## 2022-10-31 RX ORDER — ONDANSETRON 4 MG/1
4-8 TABLET, ORALLY DISINTEGRATING ORAL EVERY 6 HOURS PRN
Qty: 30 TABLET | Refills: 0 | Status: SHIPPED | OUTPATIENT
Start: 2022-10-31 | End: 2023-05-05

## 2022-11-01 ENCOUNTER — PATIENT OUTREACH (OUTPATIENT)
Dept: FAMILY MEDICINE | Facility: CLINIC | Age: 52
End: 2022-11-01

## 2022-11-01 DIAGNOSIS — R87.810 CERVICAL HIGH RISK HPV (HUMAN PAPILLOMAVIRUS) TEST POSITIVE: ICD-10-CM

## 2022-11-01 NOTE — LETTER
November 1, 2022      Amena Rosario  [address]   [place] [address]        Dear MsJose,    This letter is to remind you that you are due for your follow-up Pap smear and Human Papillomavirus (HPV) test.    Please call [phone] to schedule your appointment at your earliest convenience.    If you have completed the appointment outside of the [facility] system, please have the records forwarded to our office. We will update your chart for your provider to review before your next annual wellness visit.     Thank you for choosing [facility]!      Sincerely,    Your [facility] Care Team

## 2022-11-17 DIAGNOSIS — J45.40 MODERATE PERSISTENT ASTHMA WITHOUT COMPLICATION: ICD-10-CM

## 2022-11-17 RX ORDER — ALBUTEROL SULFATE 90 UG/1
1-2 AEROSOL, METERED RESPIRATORY (INHALATION) EVERY 4 HOURS PRN
Qty: 8.5 G | Refills: 0 | Status: SHIPPED | OUTPATIENT
Start: 2022-11-17 | End: 2022-12-16

## 2022-12-27 DIAGNOSIS — K21.00 GASTROESOPHAGEAL REFLUX DISEASE WITH ESOPHAGITIS, UNSPECIFIED WHETHER HEMORRHAGE: ICD-10-CM

## 2022-12-28 RX ORDER — OMEPRAZOLE 40 MG/1
40 CAPSULE, DELAYED RELEASE ORAL DAILY
Qty: 90 CAPSULE | Refills: 0 | Status: SHIPPED | OUTPATIENT
Start: 2022-12-28 | End: 2023-05-05

## 2022-12-30 NOTE — TELEPHONE ENCOUNTER
FYI to provider - Patient is lost to pap tracking follow-up. Attempts to contact pt have been made per reminder process and there has been no reply and/or no appt scheduled.       7/27/20 abnormal pap 10+ years ago per patient  2012 NIL Pap, Neg HPV (Care Everywhere)  7/27/2020 NIL Pap, + HR HPV (neg 16/18). Plan cotest in 1 year, due 7/27/2021.    9/8/21 Lost to follow up.   11/17/21 ASCUS pap, neg HR HPV. Plan: Cotest in 1 yr.   11/26/21 Pt notified by phone.  11/1/22 Reminder letter  12/1/22 Reminder call. Left msg  12/30/22 lost to follow up    Hemalatha Tai RN, BSN

## 2023-01-31 DIAGNOSIS — J45.40 MODERATE PERSISTENT ASTHMA WITHOUT COMPLICATION: ICD-10-CM

## 2023-02-01 RX ORDER — FLUTICASONE PROPIONATE 50 MCG
1-2 SPRAY, SUSPENSION (ML) NASAL DAILY
Qty: 16 G | Refills: 0 | Status: SHIPPED | OUTPATIENT
Start: 2023-02-01 | End: 2023-03-20

## 2023-03-25 DIAGNOSIS — J30.2 SEASONAL ALLERGIES: ICD-10-CM

## 2023-03-28 RX ORDER — LORATADINE 10 MG/1
TABLET ORAL
Qty: 90 TABLET | Refills: 0 | Status: SHIPPED | OUTPATIENT
Start: 2023-03-28 | End: 2023-06-12

## 2023-04-25 DIAGNOSIS — J45.40 MODERATE PERSISTENT ASTHMA WITHOUT COMPLICATION: ICD-10-CM

## 2023-04-27 RX ORDER — ALBUTEROL SULFATE 90 UG/1
1-2 AEROSOL, METERED RESPIRATORY (INHALATION) EVERY 4 HOURS PRN
Qty: 8.5 G | Refills: 0 | Status: SHIPPED | OUTPATIENT
Start: 2023-04-27 | End: 2023-06-02

## 2023-05-10 DIAGNOSIS — J45.40 MODERATE PERSISTENT ASTHMA WITHOUT COMPLICATION: ICD-10-CM

## 2023-05-11 RX ORDER — FLUTICASONE PROPIONATE 50 MCG
SPRAY, SUSPENSION (ML) NASAL
Qty: 16 G | Refills: 0 | Status: SHIPPED | OUTPATIENT
Start: 2023-05-11 | End: 2023-06-26

## 2023-05-22 DIAGNOSIS — J45.40 MODERATE PERSISTENT ASTHMA WITHOUT COMPLICATION: ICD-10-CM

## 2023-05-24 RX ORDER — FLUTICASONE PROPIONATE AND SALMETEROL 250; 50 UG/1; UG/1
POWDER RESPIRATORY (INHALATION)
Qty: 60 EACH | Refills: 0 | Status: SHIPPED | OUTPATIENT
Start: 2023-05-24 | End: 2023-06-12

## 2023-06-01 DIAGNOSIS — J45.40 MODERATE PERSISTENT ASTHMA WITHOUT COMPLICATION: ICD-10-CM

## 2023-06-01 DIAGNOSIS — J30.2 SEASONAL ALLERGIES: ICD-10-CM

## 2023-06-01 RX ORDER — OLOPATADINE HYDROCHLORIDE 1 MG/ML
1 SOLUTION/ DROPS OPHTHALMIC 2 TIMES DAILY
Qty: 5 ML | Refills: 0 | Status: SHIPPED | OUTPATIENT
Start: 2023-06-01 | End: 2023-07-28

## 2023-06-02 RX ORDER — ALBUTEROL SULFATE 90 UG/1
1-2 AEROSOL, METERED RESPIRATORY (INHALATION) EVERY 4 HOURS PRN
Qty: 8.5 G | Refills: 0 | Status: SHIPPED | OUTPATIENT
Start: 2023-06-02 | End: 2023-07-18

## 2023-06-12 ENCOUNTER — OFFICE VISIT (OUTPATIENT)
Dept: FAMILY MEDICINE | Facility: CLINIC | Age: 53
End: 2023-06-12
Payer: COMMERCIAL

## 2023-06-12 VITALS
SYSTOLIC BLOOD PRESSURE: 132 MMHG | TEMPERATURE: 98.3 F | DIASTOLIC BLOOD PRESSURE: 66 MMHG | HEART RATE: 69 BPM | BODY MASS INDEX: 26.17 KG/M2 | RESPIRATION RATE: 18 BRPM | OXYGEN SATURATION: 100 % | WEIGHT: 138.6 LBS | HEIGHT: 61 IN

## 2023-06-12 DIAGNOSIS — Z12.31 VISIT FOR SCREENING MAMMOGRAM: ICD-10-CM

## 2023-06-12 DIAGNOSIS — R60.0 LOWER LEG EDEMA: ICD-10-CM

## 2023-06-12 DIAGNOSIS — Z00.00 ROUTINE GENERAL MEDICAL EXAMINATION AT A HEALTH CARE FACILITY: Primary | ICD-10-CM

## 2023-06-12 DIAGNOSIS — Z23 NEED FOR DIPHTHERIA-TETANUS-PERTUSSIS (TDAP) VACCINE: ICD-10-CM

## 2023-06-12 DIAGNOSIS — Z12.11 SCREEN FOR COLON CANCER: ICD-10-CM

## 2023-06-12 DIAGNOSIS — E03.8 OTHER SPECIFIED HYPOTHYROIDISM: ICD-10-CM

## 2023-06-12 DIAGNOSIS — K21.00 GASTROESOPHAGEAL REFLUX DISEASE WITH ESOPHAGITIS, UNSPECIFIED WHETHER HEMORRHAGE: ICD-10-CM

## 2023-06-12 DIAGNOSIS — I10 BENIGN ESSENTIAL HYPERTENSION: ICD-10-CM

## 2023-06-12 DIAGNOSIS — Z23 NEED FOR SHINGLES VACCINE: ICD-10-CM

## 2023-06-12 DIAGNOSIS — J45.40 MODERATE PERSISTENT ASTHMA WITHOUT COMPLICATION: ICD-10-CM

## 2023-06-12 DIAGNOSIS — N95.1 MENOPAUSAL SYNDROME (HOT FLASHES): ICD-10-CM

## 2023-06-12 DIAGNOSIS — Z12.4 CERVICAL CANCER SCREENING: ICD-10-CM

## 2023-06-12 DIAGNOSIS — G89.29 CHRONIC LEFT SHOULDER PAIN: ICD-10-CM

## 2023-06-12 DIAGNOSIS — J30.2 SEASONAL ALLERGIES: ICD-10-CM

## 2023-06-12 DIAGNOSIS — M25.512 CHRONIC LEFT SHOULDER PAIN: ICD-10-CM

## 2023-06-12 DIAGNOSIS — Z00.00 ENCOUNTER FOR MEDICARE ANNUAL WELLNESS EXAM: ICD-10-CM

## 2023-06-12 PROCEDURE — 99214 OFFICE O/P EST MOD 30 MIN: CPT | Mod: 25 | Performed by: PHYSICIAN ASSISTANT

## 2023-06-12 PROCEDURE — G0145 SCR C/V CYTO,THINLAYER,RESCR: HCPCS | Performed by: PHYSICIAN ASSISTANT

## 2023-06-12 PROCEDURE — 87624 HPV HI-RISK TYP POOLED RSLT: CPT | Performed by: PHYSICIAN ASSISTANT

## 2023-06-12 PROCEDURE — 99396 PREV VISIT EST AGE 40-64: CPT | Performed by: PHYSICIAN ASSISTANT

## 2023-06-12 RX ORDER — ALBUTEROL SULFATE 0.83 MG/ML
2.5 SOLUTION RESPIRATORY (INHALATION) EVERY 4 HOURS PRN
Qty: 60 ML | Refills: 1 | Status: SHIPPED | OUTPATIENT
Start: 2023-06-12

## 2023-06-12 RX ORDER — LORATADINE 10 MG/1
1 TABLET ORAL DAILY
Qty: 90 TABLET | Refills: 3 | Status: SHIPPED | OUTPATIENT
Start: 2023-06-12 | End: 2024-07-12

## 2023-06-12 RX ORDER — FUROSEMIDE 20 MG
20 TABLET ORAL DAILY PRN
Qty: 40 TABLET | Refills: 3 | Status: SHIPPED | OUTPATIENT
Start: 2023-06-12

## 2023-06-12 RX ORDER — PROGESTERONE 100 MG/1
100 CAPSULE ORAL DAILY
Qty: 90 CAPSULE | Refills: 3 | Status: SHIPPED | OUTPATIENT
Start: 2023-06-12

## 2023-06-12 RX ORDER — ESTRADIOL 0.5 MG/1
0.5 TABLET ORAL DAILY
Qty: 90 TABLET | Refills: 0 | Status: SHIPPED | OUTPATIENT
Start: 2023-06-12

## 2023-06-12 RX ORDER — FLUTICASONE PROPIONATE AND SALMETEROL 250; 50 UG/1; UG/1
1 POWDER RESPIRATORY (INHALATION) EVERY 12 HOURS
Qty: 60 EACH | Refills: 11 | Status: SHIPPED | OUTPATIENT
Start: 2023-06-12 | End: 2024-06-12

## 2023-06-12 RX ORDER — HYDROXYZINE HYDROCHLORIDE 25 MG/1
TABLET, FILM COATED ORAL
COMMUNITY
Start: 2023-06-05

## 2023-06-12 RX ORDER — LAMOTRIGINE 25 MG/1
TABLET ORAL
COMMUNITY
Start: 2023-04-03

## 2023-06-12 RX ORDER — LAMOTRIGINE 100 MG/1
300 TABLET ORAL AT BEDTIME
COMMUNITY
Start: 2023-06-05 | End: 2023-06-12

## 2023-06-12 RX ORDER — LISINOPRIL 10 MG/1
10 TABLET ORAL DAILY
Qty: 90 TABLET | Refills: 0 | Status: SHIPPED | OUTPATIENT
Start: 2023-06-12 | End: 2024-05-28

## 2023-06-12 RX ORDER — OMEPRAZOLE 40 MG/1
40 CAPSULE, DELAYED RELEASE ORAL DAILY
Qty: 90 CAPSULE | Refills: 3 | Status: SHIPPED | OUTPATIENT
Start: 2023-06-12

## 2023-06-12 ASSESSMENT — ENCOUNTER SYMPTOMS
HEMATURIA: 0
WEAKNESS: 0
MYALGIAS: 1
EYE PAIN: 0
HEARTBURN: 1
JOINT SWELLING: 0
PARESTHESIAS: 0
FREQUENCY: 1
NERVOUS/ANXIOUS: 1
CONSTIPATION: 0
SHORTNESS OF BREATH: 1
ABDOMINAL PAIN: 1
CHILLS: 0
COUGH: 0
DIZZINESS: 1
BREAST MASS: 0
DIARRHEA: 0
HEADACHES: 1
ARTHRALGIAS: 0
NAUSEA: 0
PALPITATIONS: 0
DYSURIA: 0
FEVER: 0

## 2023-06-12 ASSESSMENT — ASTHMA QUESTIONNAIRES
QUESTION_1 LAST FOUR WEEKS HOW MUCH OF THE TIME DID YOUR ASTHMA KEEP YOU FROM GETTING AS MUCH DONE AT WORK, SCHOOL OR AT HOME: NONE OF THE TIME
QUESTION_3 LAST FOUR WEEKS HOW OFTEN DID YOUR ASTHMA SYMPTOMS (WHEEZING, COUGHING, SHORTNESS OF BREATH, CHEST TIGHTNESS OR PAIN) WAKE YOU UP AT NIGHT OR EARLIER THAN USUAL IN THE MORNING: ONCE A WEEK
ACT_TOTALSCORE: 17
QUESTION_2 LAST FOUR WEEKS HOW OFTEN HAVE YOU HAD SHORTNESS OF BREATH: ONCE OR TWICE A WEEK
QUESTION_5 LAST FOUR WEEKS HOW WOULD YOU RATE YOUR ASTHMA CONTROL: SOMEWHAT CONTROLLED
ACT_TOTALSCORE: 17
QUESTION_4 LAST FOUR WEEKS HOW OFTEN HAVE YOU USED YOUR RESCUE INHALER OR NEBULIZER MEDICATION (SUCH AS ALBUTEROL): ONE OR TWO TIMES PER DAY

## 2023-06-12 ASSESSMENT — ACTIVITIES OF DAILY LIVING (ADL): CURRENT_FUNCTION: NO ASSISTANCE NEEDED

## 2023-06-12 ASSESSMENT — PAIN SCALES - GENERAL: PAINLEVEL: MILD PAIN (2)

## 2023-06-12 NOTE — PATIENT INSTRUCTIONS
Call insurance and ask where you should get a Pneumovax, Shingles, and Tetanus shot.    Non hormonal meds for hot flashes:  Black Cohosh and Effexor    Adult lifestyle changes to prevent GERD      Avoid eating and drinking within two to three hours prior to bedtime    Do not drink alcohol    Limit gum chewing    Eat small meals and eat slowly    Limit problem foods:    o Caffeine  o Carbonated drinks  o Chocolate  o Peppermint  o Tomato  o Citrus fruits (orange, lemon, lime, pineapple, grapefruit)  o Fatty and fried foods      Lose weight     Quit smoking      Preventive Health Recommendations  Female Ages 50 - 64    Yearly exam: See your health care provider every year in order to  o Review health changes.   o Discuss preventive care.    o Review your medicines if your doctor has prescribed any.      Get a Pap test every three years (unless you have an abnormal result and your provider advises testing more often).    If you get Pap tests with HPV test, you only need to test every 5 years, unless you have an abnormal result.     You do not need a Pap test if your uterus was removed (hysterectomy) and you have not had cancer.    You should be tested each year for STDs (sexually transmitted diseases) if you're at risk.     Have a mammogram every 1 to 2 years.    Have a colonoscopy at age 50, or have a yearly FIT test (stool test). These exams screen for colon cancer.      Have a cholesterol test every 5 years, or more often if advised.    Have a diabetes test (fasting glucose) every three years. If you are at risk for diabetes, you should have this test more often.     If you are at risk for osteoporosis (brittle bone disease), think about having a bone density scan (DEXA).    Shots: Get a flu shot each year. Get a tetanus shot every 10 years.    Nutrition:     Eat at least 5 servings of fruits and vegetables each day.    Eat whole-grain bread, whole-wheat pasta and brown rice instead of white grains and rice.    Get  adequate Calcium and Vitamin D.     Lifestyle    Exercise at least 150 minutes a week (30 minutes a day, 5 days a week). This will help you control your weight and prevent disease.    Limit alcohol to one drink per day.    No smoking.     Wear sunscreen to prevent skin cancer.     See your dentist every six months for an exam and cleaning.    See your eye doctor every 1 to 2 years.    Patient Education   Personalized Prevention Plan  You are due for the preventive services outlined below.  Your care team is available to assist you in scheduling these services.  If you have already completed any of these items, please share that information with your care team to update in your medical record.  Health Maintenance Due   Topic Date Due     URINE DRUG SCREEN  Never done     Pneumococcal Vaccine (2 - PCV) 12/01/2011     Zoster (Shingles) Vaccine (1 of 2) Never done     LUNG CANCER SCREENING  Never done     Diptheria Tetanus Pertussis (DTAP/TDAP/TD) Vaccine (2 - Td or Tdap) 04/09/2022     PAP  11/17/2022     HPV Follow Up  11/17/2022     Basic Metabolic Panel  12/28/2022     Kidney Microalbumin Urine Test  12/28/2022     Thyroid Function Lab  12/28/2022     Colorectal Cancer Screening  12/29/2022     Mammogram  02/24/2023     Your Health Risk Assessment indicates you feel you are not in good health    A healthy lifestyle helps keep the body fit and the mind alert. It helps protect you from disease, helps you fight disease, and helps prevent chronic disease (disease that doesn't go away) from getting worse. This is important as you get older and begin to notice twinges in muscles and joints and a decline in the strength and stamina you once took for granted. A healthy lifestyle includes good healthcare, good nutrition, weight control, recreation, and regular exercise. Avoid harmful substances and do what you can to keep safe. Another part of a healthy lifestyle is stay mentally active and socially involved.    Good  healthcare     Have a wellness visit every year.     If you have new symptoms, let us know right away. Don't wait until the next checkup.     Take medicines exactly as prescribed and keep your medicines in a safe place. Tell us if your medicine causes problems.   Healthy diet and weight control     Eat 3 or 4 small, nutritious, low-fat, high-fiber meals a day. Include a variety of fruits, vegetables, and whole-grain foods.     Make sure you get enough calcium in your diet. Calcium, vitamin D, and exercise help prevent osteoporosis (bone thinning).     If you live alone, try eating with others when you can. That way you get a good meal and have company while you eat it.     Try to keep a healthy weight. If you eat more calories than your body uses for energy, it will be stored as fat and you will gain weight.     Recreation   Recreation is not limited to sports and team events. It includes any activity that provides relaxation, interest, enjoyment, and exercise. Recreation provides an outlet for physical, mental, and social energy. It can give a sense of worth and achievement. It can help you stay healthy.    Mental Exercise and Social Involvement  Mental and emotional health is as important as physical health. Keep in touch with friends and family. Stay as active as possible. Continue to learn and challenge yourself.   Things you can do to stay mentally active are:    Learn something new, like a foreign language or musical instrument.     Play SCRABBLE or do crossword puzzles. If you cannot find people to play these games with you at home, you can play them with others on your computer through the Internet.     Join a games club--anything from card games to chess or checkers or lawn bowling.     Start a new hobby.     Go back to school.     Volunteer.     Read.   Keep up with world events.    Signs of Hearing Loss  Hearing loss is a problem shared by many people. In fact, it's one of the most common health problems,  particularly as people age. Most people aged 65 and older have some hearing loss. By age 80, almost everyone does. Hearing loss often occurs slowly over the years. So, you may not realize your hearing has gotten worse.   When sudden hearing loss occurs, it's important to contact your healthcare provider right away. Your provider will do a medical exam and a hearing exam as soon as possible. This is to help find the cause and type of your sudden hearing loss. Based on your diagnosis, your healthcare provider will discuss possible treatments.      Hearing much better with one ear can be a sign of hearing loss.     Have your hearing checked  Call your healthcare provider if you:     Have to strain to hear normal conversation    Have to watch other people s faces very carefully to follow what they re saying    Need to ask people to repeat what they ve said    Often misunderstand what people are saying    Turn the volume of the television or radio up so high that others complain    Feel that people are mumbling when they re talking to you    Find that the effort to hear leaves you feeling tired and irritated    Notice, when using the phone, that you hear better with one ear than the other  [employer] last reviewed this educational content on 6/1/2022 2000-2022 [employer]. All rights reserved. This information is not intended as a substitute for professional medical care. Always follow your healthcare professional's instructions.        Your Health Risk Assessment indicates you feel you are not in good emotional health.    Recreation   Recreation is not limited to sports and team events. It includes any activity that provides relaxation, interest, enjoyment, and exercise. Recreation provides an outlet for physical, mental, and social energy. It can give a sense of worth and achievement. It can help you stay healthy.    Mental Exercise and Social Involvement  Mental and emotional health is as important as  physical health. Keep in touch with friends and family. Stay as active as possible. Continue to learn and challenge yourself.   Things you can do to stay mentally active are:    Learn something new, like a foreign language or musical instrument.     Play SCRABBLE or do crossword puzzles. If you cannot find people to play these games with you at home, you can play them with others on your computer through the Internet.     Join a games club--anything from card games to chess or checkers or lawn bowling.     Start a new hobby.     Go back to school.     Volunteer.     Read.   Keep up with world events.

## 2023-06-12 NOTE — LETTER
My Asthma Action Plan    Name: Amena Rosario   YOB: 1970  Date: 6/12/2023   My doctor: April Bergeron PA-C   My clinic: [facility]        My Control Medicine: Advair 25-50  My Rescue Medicine: Albuterol (Proair/Ventolin/Proventil HFA) 2-4 puffs EVERY 4 HOURS as needed. Use a spacer if recommended by your provider.   My Asthma Severity:   Moderate Persistent  Know your asthma triggers: Patient is unaware of triggers  alleries/ dust mites/ allergic to everything            GREEN ZONE   Good Control  I feel good  No cough or wheeze  Can work, sleep and play without asthma symptoms       Take your asthma control medicine every day.     If exercise triggers your asthma, take your rescue medication  15 minutes before exercise or sports, and  During exercise if you have asthma symptoms  Spacer to use with inhaler: If you have a spacer, make sure to use it with your inhaler             YELLOW ZONE Getting Worse  I have ANY of these:  I do not feel good  Cough or wheeze  Chest feels tight  Wake up at night   Keep taking your Green Zone medications  Start taking your rescue medicine:  every 20 minutes for up to 1 hour. Then every 4 hours for 24-48 hours.  If you stay in the Yellow Zone for more than 12-24 hours, contact your doctor.  If you do not return to the Green Zone in 12-24 hours or you get worse, start taking your oral steroid medicine if prescribed by your provider.           RED ZONE Medical Alert - Get Help  I have ANY of these:  I feel awful  Medicine is not helping  Breathing getting harder  Trouble walking or talking  Nose opens wide to breathe       Take your rescue medicine NOW  If your provider has prescribed an oral steroid medicine, start taking it NOW  Call your doctor NOW  If you are still in the Red Zone after 20 minutes and you have not reached your doctor:  Take your rescue medicine again and  Call 911 or go to the emergency room right away    See your  regular doctor within 2 weeks of an Emergency Room or Urgent Care visit for follow-up treatment.          Annual Reminders:  Meet with Asthma Educator,  Flu Shot in the Fall, consider Pneumonia Vaccination for patients with asthma (aged 19 and older).    Pharmacy: [id number] - [place], [place] - [address]    Electronically signed by April Bergeron PA-C   Date: 06/12/23                      Asthma Triggers  How To Control Things That Make Your Asthma Worse    Triggers are things that make your asthma worse.  Look at the list below to help you find your triggers and what you can do about them.  You can help prevent asthma flare-ups by staying away from your triggers.      Trigger                                                          What you can do   Cigarette Smoke  Tobacco smoke can make asthma worse. Do not allow smoking in your home, car or around you.  Be sure no one smokes at a child s day care or school.  If you smoke, ask your health care provider for ways to help you quit.  Ask family members to quit too.  Ask your health care provider for a referral to Quit Plan to help you quit smoking, or call 2-312-763-PLAN.     Colds, Flu, Bronchitis  These are common triggers of asthma. Wash your hands often.  Don t touch your eyes, nose or mouth.  Get a flu shot every year.     Dust Mites  These are tiny bugs that live in cloth or carpet. They are too small to see. Wash sheets and blankets in hot water every week.   Encase pillows and mattress in dust mite proof covers.  Avoid having carpet if you can. If you have carpet, vacuum weekly.   Use a dust mask and HEPA vacuum.   Pollen and Outdoor Mold  Some people are allergic to trees, grass, or weed pollen, or molds. Try to keep your windows closed.  Limit time out doors when pollen count is high.   Ask you health care provider about taking medicine during allergy season.     Animal Dander  Some people are allergic to skin flakes, urine or saliva  from pets with fur or feathers. Keep pets with fur or feathers out of your home.    If you can t keep the pet outdoors, then keep the pet out of your bedroom.  Keep the bedroom door closed.  Keep pets off cloth furniture and away from stuffed toys.     Mice, Rats, and Cockroaches   Some people are allergic to the waste from these pests.   Cover food and garbage.  Clean up spills and food crumbs.  Store grease in the refrigerator.   Keep food out of the bedroom.   Indoor Mold  This can be a trigger if your home has high moisture. Fix leaking faucets, pipes, or other sources of water.   Clean moldy surfaces.  Dehumidify basement if it is damp and smelly.   Smoke, Strong Odors, and Sprays  These can reduce air quality. Stay away from strong odors and sprays, such as perfume, powder, hair spray, paints, smoke incense, paint, cleaning products, candles and new carpet.   Exercise or Sports  Some people with asthma have this trigger. Be active!  Ask your doctor about taking medicine before sports or exercise to prevent symptoms.    Warm up for 5-10 minutes before and after sports or exercise.     Other Triggers of Asthma  Cold air:  Cover your nose and mouth with a scarf.  Sometimes laughing or crying can be a trigger.  Some medicines and food can trigger asthma.

## 2023-06-12 NOTE — PROGRESS NOTES
"     SUBJECTIVE:   CC: Amena is an 52 year old who presents for preventive health visit.       6/12/2023     3:23 PM   Additional Questions   Roomed by Claudia   Accompanied by     self     Healthy Habits:     In general, how would you rate your overall health?  Fair    Frequency of exercise:  2-3 days/week    Duration of exercise:  45-60 minutes    Do you usually eat at least 4 servings of fruit and vegetables a day, include whole grains    & fiber and avoid regularly eating high fat or \"junk\" foods?  Yes    Medication side effects:  None    Ability to successfully perform activities of daily living:  No assistance needed    Home Safety:  No safety concerns identified    Hearing Impairment:  Need to ask people to speak up or repeat themselves    In the past 6 months, have you been bothered by leaking of urine?  No    In general, how would you rate your overall mental or emotional health?  Fair      PHQ-2 Total Score: 1    Additional concerns today:  No  Shoulder Pain  Associated symptoms include abdominal pain, congestion, headaches and myalgias. Pertinent negatives include no arthralgias, chest pain, chills, coughing, fever, joint swelling, nausea, rash or weakness.     Fell in January onto left shoulder  Pain with ROM, external and internal rotation, lift off, end ROM of abduction    Mass under the skin on forehead  x6 months, possible growth, hard to tell  Possible referred pain when pressing on it      The 10-year ASCVD risk score (Chucho MENDEZ, et al., 2019) is: 4.7%    Values used to calculate the score:      Age: 52 years      Sex: Female      Is Non- : No      Diabetic: No      Tobacco smoker: Yes      Systolic Blood Pressure: 132 mmHg      Is BP treated: Yes      HDL Cholesterol: 75 mg/dL      Total Cholesterol: 197 mg/dL       Asthma follow up   Uses Advair BID    HTN follow up  Takes lisinopril 10mg  Due for labs    Chronic/Recurring Back Pain Follow Up      Where is your back pain " located? (Select all that apply) shoulders left    How would you describe your back pain?  burning and sharp    Where does your back pain spread? nowhere    Since your last clinic visit for back pain, how has your pain changed? gradually worsening    Does your back pain interfere with your job? Not applicable    Since your last visit, have you tried any new treatment? Yes -  NSAIDs (Ibuprofen, Naproxen)      Today's PHQ-2 Score:       6/12/2023     3:20 PM   PHQ-2 ( 1999 Pf[employer])   Q1: Little interest or pleasure in doing things 0   Q2: Feeling down, depressed or hopeless 1   PHQ-2 Score 1   Q1: Little interest or pleasure in doing things Not at all   Q2: Feeling down, depressed or hopeless Several days   PHQ-2 Score 1           Social History     Tobacco Use     Smoking status: Some Days     Types: Cigarettes     Passive exposure: Current     Smokeless tobacco: Never     Tobacco comments:     E-CIG   Vaping Use     Vaping status: Former     Substances: Nicotine, Flavoring     Devices: Disposable   Substance Use Topics     Alcohol use: Yes             6/12/2023     3:20 PM   Alcohol Use   Prescreen: >3 drinks/day or >7 drinks/week? No          View : No data to display.              Reviewed orders with patient.  Reviewed health maintenance and updated orders accordingly - Yes  Labs reviewed in EPIC    Breast Cancer Screening:    FHS-7:       2/24/2022     2:23 PM 2/24/2022     2:24 PM 6/12/2023     3:23 PM   Breast CA Risk Assessment (FHS-7)   Did any of your first-degree relatives have breast or ovarian cancer? No Yes Yes   Did any of your relatives have bilateral breast cancer? No No Unknown   Did any man in your family have breast cancer? No No No   Did any woman in your family have breast and ovarian cancer? No No Yes   Did any woman in your family have breast cancer before age 50 y? No No No   Do you have 2 or more relatives with breast and/or ovarian cancer? No Yes Yes   Do you have 2 or more relatives with  breast and/or bowel cancer? No No Yes       Mammogram Screening: Recommended annual mammography  Pertinent mammograms are reviewed under the imaging tab.    History of abnormal Pap smear: YES - updated in Problem List and Health Maintenance accordingly      Latest Ref Rng & Units 11/17/2021    10:13 AM 7/27/2020     1:38 PM   PAP / HPV   PAP  Atypical squamous cells of undetermined significance (ASC-US)      PAP (Historical)   NIL     HPV 16 DNA Negative Negative   Negative     HPV 18 DNA Negative Negative   Negative     Other HR HPV Negative Negative   Positive       Reviewed and updated as needed this visit by clinical staff   Tobacco  Allergies  Meds              Reviewed and updated as needed this visit by Provider                 Past Medical History:   Diagnosis Date     Cervical high risk HPV (human papillomavirus) test positive 07/27/2020    see problem list        Review of Systems   Constitutional: Negative for chills and fever.   HENT: Positive for congestion. Negative for ear pain and hearing loss.    Eyes: Negative for pain and visual disturbance.   Respiratory: Positive for shortness of breath. Negative for cough.    Cardiovascular: Positive for peripheral edema. Negative for chest pain and palpitations.   Gastrointestinal: Positive for abdominal pain and heartburn. Negative for constipation, diarrhea and nausea.   Breasts:  Negative for tenderness, breast mass and discharge.   Genitourinary: Positive for frequency. Negative for dysuria, genital sores, hematuria, pelvic pain, urgency, vaginal bleeding and vaginal discharge.   Musculoskeletal: Positive for myalgias. Negative for arthralgias and joint swelling.   Skin: Negative for rash.   Neurological: Positive for dizziness and headaches. Negative for weakness and paresthesias.   Psychiatric/Behavioral: Positive for mood changes. The patient is nervous/anxious.         OBJECTIVE:   /66   Pulse 69   Temp 98.3  F (36.8  C) (Temporal)   Resp  "18   Ht 1.539 m (5' 0.59\")   Wt 62.9 kg (138 lb 9.6 oz)   LMP  (LMP Unknown)   SpO2 100%   BMI 26.54 kg/m    Physical Exam  GENERAL: healthy, alert and no distress  EYES: Eyes grossly normal to inspection, PERRL and conjunctivae and sclerae normal  HENT: ear canals and TM's normal, nose and mouth without ulcers or lesions  NECK: no adenopathy, no asymmetry, masses, or scars and thyroid normal to palpation  RESP: lungs clear to auscultation - no rales, rhonchi or wheezes  BREAST: normal without masses, tenderness or nipple discharge and no palpable axillary masses or adenopathy  CV: regular rates and rhythm, normal S1 S2, no S3 or S4 and no murmur, click or rub  ABDOMEN: soft, nontender, no hepatosplenomegaly, no masses and bowel sounds normal   (female): normal female external genitalia, normal urethral meatus, vaginal mucosa pink, moist, well rugated, and normal cervix  MS: no gross musculoskeletal defects noted, no edema  SKIN: no suspicious lesions or rashes  NEURO: Normal strength and tone, mentation intact and speech normal  PSYCH: mentation appears normal, affect normal/bright      ASSESSMENT/PLAN:       ICD-10-CM    1. Routine general medical examination at a health care facility  Z00.00       2. Need for shingles vaccine  Z23 zoster vaccine recombinant adjuvanted (SHINGRIX) injection      3. Need for diphtheria-tetanus-pertussis (Tdap) vaccine  Z23 Tdap, tetanus-diptheria-acell pertussis, (BOOSTRIX) 5-2.5-18.5 LF-MCG/0.5 JIGAR injection      4. Visit for screening mammogram  Z12.31 MA SCREENING DIGITAL BILAT - Future  (s+30)      5. Cervical cancer screening  Z12.4 Pap Screen with HPV - recommended age 30 - 65 years      6. Screen for colon cancer  Z12.11 Fecal colorectal cancer screen FIT - Future (S+30)      7. Benign essential hypertension  I10 Basic metabolic panel     Albumin Random Urine Quantitative with Creat Ratio     lisinopril (ZESTRIL) 10 MG tablet      8. Other specified hypothyroidism  " "E03.8 TSH with free T4 reflex      9. Lower leg edema  R60.0 furosemide (LASIX) 20 MG tablet      10. Moderate persistent asthma without complication  J45.40 fluticasone-salmeterol (ADVAIR) 250-50 MCG/ACT inhaler     albuterol (PROVENTIL) (2.5 MG/3ML) 0.083% neb solution      11. Seasonal allergies  J30.2 loratadine (CLARITIN) 10 MG tablet      12. Gastroesophageal reflux disease with esophagitis, unspecified whether hemorrhage  K21.00 omeprazole (PRILOSEC) 40 MG DR capsule      13. Chronic left shoulder pain  M25.512 MR Shoulder Left w/o Contrast    G89.29       14. Menopausal syndrome (hot flashes)  N95.1 estradiol (ESTRACE) 0.5 MG tablet     progesterone (PROMETRIUM) 100 MG capsule          1-6) Screenings discussed    7,8) Routine labs in process. Blood pressure at goal. Meds renewed, no changes    9) Uses PRN, 2-3x per week for excess swelling.    10,11) patient feels her asthma has flared some due to allergies. She does not want to make a change to her inhaler regimen. Meds renewed    12) Med renewed. We reviewed common triggers for GERD and gastritis.     13) Will obtain an MRI for further evaluation. May need a physical therapy referral and/or orthopedic referral pending results.    14) Start estradiol and progesterone. She'll let me know if there's hasn't been improvements in hot flashes in 3-4 weeks.      COUNSELING:  Reviewed preventive health counseling, as reflected in patient instructions      BMI:   Estimated body mass index is 26.54 kg/m  as calculated from the following:    Height as of this encounter: 1.539 m (5' 0.59\").    Weight as of this encounter: 62.9 kg (138 lb 9.6 oz).     She reports that she has been smoking cigarettes. She has been exposed to tobacco smoke. She has never used smokeless tobacco.          April Bergeron PA-C  [facility]    The patient was provided with suggestions to help her develop a healthy physical lifestyle.  The patient was provided with " written information regarding signs of hearing loss.  The patient was provided with suggestions to help her develop a healthy emotional lifestyle.

## 2023-06-14 LAB
BKR LAB AP GYN ADEQUACY: NORMAL
BKR LAB AP GYN INTERPRETATION: NORMAL
BKR LAB AP HPV REFLEX: NORMAL
BKR LAB AP PREVIOUS ABNL DX: NORMAL
BKR LAB AP PREVIOUS ABNORMAL: NORMAL
PATH REPORT.COMMENTS IMP SPEC: NORMAL
PATH REPORT.COMMENTS IMP SPEC: NORMAL
PATH REPORT.RELEVANT HX SPEC: NORMAL

## 2023-06-16 ENCOUNTER — PATIENT OUTREACH (OUTPATIENT)
Dept: FAMILY MEDICINE | Facility: CLINIC | Age: 53
End: 2023-06-16
Payer: COMMERCIAL

## 2023-06-16 NOTE — LETTER
June 16, 2023      Amena Rosario  [address]   [place] [address]        Dear MsKathyAbraham,    We are happy to inform you that your recent Pap smear and Human Papillomavirus (HPV) test results are normal and negative.    It is recommended that you have your next Pap smear and Human Papillomavirus (HPV) test in 1 year. You will also need to return to the clinic every year for an annual wellness visit.    If you have additional questions regarding this result, please contact our office and we will be happy to assist you.      Sincerely,    Your [facility] Care Team

## 2023-06-26 DIAGNOSIS — J45.40 MODERATE PERSISTENT ASTHMA WITHOUT COMPLICATION: ICD-10-CM

## 2023-06-26 RX ORDER — FLUTICASONE PROPIONATE 50 MCG
SPRAY, SUSPENSION (ML) NASAL
Qty: 16 G | Refills: 0 | Status: SHIPPED | OUTPATIENT
Start: 2023-06-26 | End: 2023-07-28

## 2023-07-27 DIAGNOSIS — J30.2 SEASONAL ALLERGIES: ICD-10-CM

## 2023-07-27 DIAGNOSIS — J45.40 MODERATE PERSISTENT ASTHMA WITHOUT COMPLICATION: ICD-10-CM

## 2023-07-28 RX ORDER — OLOPATADINE HYDROCHLORIDE 1 MG/ML
1 SOLUTION/ DROPS OPHTHALMIC 2 TIMES DAILY
Qty: 5 ML | Refills: 1 | Status: SHIPPED | OUTPATIENT
Start: 2023-07-28 | End: 2024-01-12

## 2023-07-28 RX ORDER — FLUTICASONE PROPIONATE 50 MCG
SPRAY, SUSPENSION (ML) NASAL
Qty: 16 G | Refills: 1 | Status: SHIPPED | OUTPATIENT
Start: 2023-07-28 | End: 2023-12-18

## 2023-08-14 ENCOUNTER — ANCILLARY PROCEDURE (OUTPATIENT)
Dept: MAMMOGRAPHY | Facility: CLINIC | Age: 53
End: 2023-08-14
Attending: PHYSICIAN ASSISTANT
Payer: COMMERCIAL

## 2023-08-14 DIAGNOSIS — Z12.31 VISIT FOR SCREENING MAMMOGRAM: ICD-10-CM

## 2023-08-14 PROCEDURE — 77067 SCR MAMMO BI INCL CAD: CPT | Mod: TC | Performed by: RADIOLOGY

## 2023-10-18 DIAGNOSIS — R11.0 NAUSEA: ICD-10-CM

## 2023-10-18 RX ORDER — ONDANSETRON 4 MG/1
4-8 TABLET, ORALLY DISINTEGRATING ORAL EVERY 6 HOURS PRN
Qty: 30 TABLET | Refills: 0 | Status: SHIPPED | OUTPATIENT
Start: 2023-10-18 | End: 2024-01-17

## 2023-11-17 ENCOUNTER — TELEPHONE (OUTPATIENT)
Dept: FAMILY MEDICINE | Facility: CLINIC | Age: 53
End: 2023-11-17
Payer: COMMERCIAL
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
## 2023-12-16 DIAGNOSIS — J45.40 MODERATE PERSISTENT ASTHMA WITHOUT COMPLICATION: ICD-10-CM

## 2023-12-18 RX ORDER — FLUTICASONE PROPIONATE 50 MCG
SPRAY, SUSPENSION (ML) NASAL
Qty: 16 G | Refills: 0 | Status: SHIPPED | OUTPATIENT
Start: 2023-12-18 | End: 2024-01-12

## 2024-01-02 ENCOUNTER — TRANSFERRED RECORDS (OUTPATIENT)
Dept: HEALTH INFORMATION MANAGEMENT | Facility: CLINIC | Age: 54
End: 2024-01-02
Payer: COMMERCIAL

## 2024-01-02 DIAGNOSIS — J45.40 MODERATE PERSISTENT ASTHMA WITHOUT COMPLICATION: ICD-10-CM

## 2024-01-03 RX ORDER — ALBUTEROL SULFATE 90 UG/1
1-2 AEROSOL, METERED RESPIRATORY (INHALATION) EVERY 4 HOURS PRN
Qty: 17 G | Refills: 1 | Status: SHIPPED | OUTPATIENT
Start: 2024-01-03 | End: 2024-04-01

## 2024-01-12 DIAGNOSIS — J45.40 MODERATE PERSISTENT ASTHMA WITHOUT COMPLICATION: ICD-10-CM

## 2024-01-12 DIAGNOSIS — J30.2 SEASONAL ALLERGIES: ICD-10-CM

## 2024-01-12 RX ORDER — OLOPATADINE HYDROCHLORIDE 1 MG/ML
1 SOLUTION/ DROPS OPHTHALMIC 2 TIMES DAILY
Qty: 5 ML | Refills: 0 | Status: SHIPPED | OUTPATIENT
Start: 2024-01-12 | End: 2024-01-30

## 2024-01-12 RX ORDER — FLUTICASONE PROPIONATE 50 MCG
SPRAY, SUSPENSION (ML) NASAL
Qty: 16 G | Refills: 0 | Status: SHIPPED | OUTPATIENT
Start: 2024-01-12 | End: 2024-03-28

## 2024-01-16 DIAGNOSIS — R11.0 NAUSEA: ICD-10-CM

## 2024-01-17 RX ORDER — ONDANSETRON 4 MG/1
TABLET, ORALLY DISINTEGRATING ORAL
Qty: 30 TABLET | Refills: 0 | Status: SHIPPED | OUTPATIENT
Start: 2024-01-17 | End: 2024-03-13

## 2024-01-30 DIAGNOSIS — J30.2 SEASONAL ALLERGIES: ICD-10-CM

## 2024-01-30 RX ORDER — OLOPATADINE HYDROCHLORIDE 1 MG/ML
1 SOLUTION/ DROPS OPHTHALMIC 2 TIMES DAILY
Qty: 5 ML | Refills: 3 | Status: SHIPPED | OUTPATIENT
Start: 2024-01-30 | End: 2024-07-10

## 2024-03-12 DIAGNOSIS — R11.0 NAUSEA: ICD-10-CM

## 2024-03-13 RX ORDER — ONDANSETRON 4 MG/1
TABLET, ORALLY DISINTEGRATING ORAL
Qty: 30 TABLET | Refills: 0 | Status: SHIPPED | OUTPATIENT
Start: 2024-03-13 | End: 2024-04-18

## 2024-03-28 DIAGNOSIS — J45.40 MODERATE PERSISTENT ASTHMA WITHOUT COMPLICATION: ICD-10-CM

## 2024-03-28 RX ORDER — FLUTICASONE PROPIONATE 50 MCG
SPRAY, SUSPENSION (ML) NASAL
Qty: 16 G | Refills: 0 | Status: SHIPPED | OUTPATIENT
Start: 2024-03-28 | End: 2024-05-28

## 2024-04-01 DIAGNOSIS — J45.40 MODERATE PERSISTENT ASTHMA WITHOUT COMPLICATION: ICD-10-CM

## 2024-04-01 RX ORDER — ALBUTEROL SULFATE 90 UG/1
1-2 AEROSOL, METERED RESPIRATORY (INHALATION) EVERY 4 HOURS PRN
Qty: 17 G | Refills: 0 | Status: SHIPPED | OUTPATIENT
Start: 2024-04-01 | End: 2024-05-17

## 2024-04-09 DIAGNOSIS — Z79.899 HIGH RISK MEDICATION USE: ICD-10-CM

## 2024-04-09 DIAGNOSIS — Z79.890 NEED FOR PROPHYLACTIC HORMONE REPLACEMENT THERAPY (POSTMENOPAUSAL): ICD-10-CM

## 2024-04-09 DIAGNOSIS — F31.9 BIPOLAR DISORDER, UNSPECIFIED (H): Primary | ICD-10-CM

## 2024-04-18 DIAGNOSIS — R11.0 NAUSEA: ICD-10-CM

## 2024-04-18 RX ORDER — ONDANSETRON 4 MG/1
TABLET, ORALLY DISINTEGRATING ORAL
Qty: 30 TABLET | Refills: 1 | Status: SHIPPED | OUTPATIENT
Start: 2024-04-18 | End: 2024-06-25

## 2024-04-19 ENCOUNTER — TELEPHONE (OUTPATIENT)
Dept: FAMILY MEDICINE | Facility: CLINIC | Age: 54
End: 2024-04-19
Payer: COMMERCIAL

## 2024-04-19 NOTE — TELEPHONE ENCOUNTER
Prior Authorization Retail Medication Request    Medication/Dose: ondansetron (ZOFRAN ODT) 4 MG ODT tab  Diagnosis and ICD code (if different than what is on RX):  R11.0   New/renewal/insurance change PA/secondary ins. PA:  Previously Tried and Failed:  na  Rationale:  na    Insurance   Primary:  [facility]  Insurance ID:  [id number]     Secondary (if applicable):tiffany  Insurance ID:  tiffany    Pharmacy Information (if different than what is on RX)  Name:   [facility]  Phone:  [phone]  Fax:     [phone]

## 2024-04-22 DIAGNOSIS — Z91.030 BEE ALLERGY STATUS: ICD-10-CM

## 2024-04-23 RX ORDER — EPINEPHRINE 0.3 MG/.3ML
INJECTION SUBCUTANEOUS
Qty: 2 EACH | Refills: 0 | Status: SHIPPED | OUTPATIENT
Start: 2024-04-23

## 2024-05-02 NOTE — TELEPHONE ENCOUNTER
PA Initiation    Medication: ONDANSETRON 4 MG PO TBDP  Insurance Company: [employer] ([facility]) - Phone [phone] Fax [phone]  Pharmacy Filling the Rx: [id number] - [place] - [address]  Filling Pharmacy Phone: [phone]  Filling Pharmacy Fax: [phone]  Start Date: 5/2/2024        Note: Due to record-high volumes, our turn-around time is taking longer than usual . We are currently 10 business days behind in the pools.   We are working diligently to submit all requests in a timely manner and in the order they are received. Please only flag TRUE URGENT requests as high priority to the pool at this time.   If you have questions - please send a note/message in the active PA encounter and send back to the [facility] PA pool [[id number]].    If you have more specific questions about our process please reach out to our supervisor Nadeen Arizmendi.   Thank you!   RPPA (Retail Pharmacy Prior Authorization) team

## 2024-05-06 NOTE — TELEPHONE ENCOUNTER
PRIOR AUTHORIZATION DENIED    Medication: ONDANSETRON 4 MG PO TBDP  Insurance Company: [employer] ([facility]) - Phone [phone] Fax [phone]  Denial Date: 5/2/2024  Denial Reason(s):       Appeal Information:       Patient Notified: NO

## 2024-05-16 DIAGNOSIS — J45.40 MODERATE PERSISTENT ASTHMA WITHOUT COMPLICATION: ICD-10-CM

## 2024-05-17 RX ORDER — ALBUTEROL SULFATE 90 UG/1
1-2 AEROSOL, METERED RESPIRATORY (INHALATION) EVERY 4 HOURS PRN
Qty: 17 G | Refills: 0 | Status: SHIPPED | OUTPATIENT
Start: 2024-05-17 | End: 2024-07-22

## 2024-05-24 ENCOUNTER — NURSE TRIAGE (OUTPATIENT)
Dept: FAMILY MEDICINE | Facility: CLINIC | Age: 54
End: 2024-05-24
Payer: COMMERCIAL

## 2024-05-24 NOTE — TELEPHONE ENCOUNTER
FYI - Status Update    Who is Calling: patient    Update: She has been having a lot of stomach issues that have gotten worse. She would like a CT to check her gallbladder.  Would rather be checked for her stomach issues then a physical     Does caller want a call/response back: Yes     Okay to leave a detailed message?: Yes at Cell number on file:    Telephone Information:   Mobile [phone]

## 2024-05-28 DIAGNOSIS — I10 BENIGN ESSENTIAL HYPERTENSION: ICD-10-CM

## 2024-05-28 DIAGNOSIS — J45.40 MODERATE PERSISTENT ASTHMA WITHOUT COMPLICATION: ICD-10-CM

## 2024-05-28 RX ORDER — FLUTICASONE PROPIONATE 50 MCG
SPRAY, SUSPENSION (ML) NASAL
Qty: 16 G | Refills: 0 | Status: SHIPPED | OUTPATIENT
Start: 2024-05-28 | End: 2024-07-24

## 2024-05-28 RX ORDER — LISINOPRIL 10 MG/1
10 TABLET ORAL DAILY
Qty: 60 TABLET | Refills: 0 | Status: SHIPPED | OUTPATIENT
Start: 2024-05-28 | End: 2024-07-19

## 2024-05-28 NOTE — TELEPHONE ENCOUNTER
Nurse Triage SBAR    Is this a 2nd Level Triage? YES, LICENSED PRACTITIONER REVIEW IS REQUIRED    Situation: Pt stated that she has ongoing stomach discomfort that has been increasing for the past 3 months.     Background: dx GERD and taking omeprazole, zofran, and tums     Assessment: Pt experiences discomfort on top of abd to throat with pressure on top of stomach. Pt stated that she experiences 3-5 episodes a day. Pt noticed swelling on upper abdomen and has been experiencing gas, 3-4 pain when episodes occur. Pt is full more easily now such as eating half a cookie. Made herself throw up 3 times in the past three months due to discomfort. No difficulty breathing while experiencing episodes, able to drink liquids with no concerns. Pt was not experiencing any pain or discomfort at the time of call as she had taken 2 tums and zofran for heart burn that resolved symptoms.     Protocol Recommended Disposition:   See in Office Today or Tomorrow: RN offered sooner appointment with pcp to discuss symptoms. Pt declined sooner appointment with pcp due to not having a ride. Pt would like to wait for 7/2/24 appointment with pcp to discuss this. Pt verbalized use of ambulance transport to ED if her symptoms are worsening.     Recommendation: Do you want a virtual appointment to discuss due to pt not having a ride?     Routed to provider    Does the patient meet one of the following criteria for ADS visit consideration? 16+ years old, with an [facility] PCP     TIP  Providers, please consider if this condition is appropriate for management at one of our Acute and Diagnostic Services sites.     If patient is a good candidate, please use dotphrase <dot>triageresponse and select Refer to ADS to document.    Nasima Solis RN on 5/28/2024 at 9:58 AM     Reason for Disposition   MILD pain that comes and goes (cramps) > 72 hours  (Exception: This same abdominal pain is a chronic symptom recurrent or ongoing AND present > 4 weeks.)   Patient  wants to be seen    Additional Information   Negative: SEVERE difficulty breathing (e.g., struggling for each breath, speaks in single words)   Negative: Shock suspected (e.g., cold/pale/clammy skin, too weak to stand, low BP, rapid pulse)   Negative: Difficult to awaken or acting confused (e.g., disoriented, slurred speech)   Negative: Passed out (i.e., lost consciousness, collapsed and was not responding)   Negative: Visible sweat on face or sweat is dripping down   Negative: Sounds like a life-threatening emergency to the triager   Negative: Followed an abdomen (stomach) injury   Negative: Chest pain   Negative: Abdominal pain and pregnant < 20 weeks   Negative: Abdominal pain and pregnant 20 or more weeks   Negative: Abdomen bloating or swelling are main symptoms   Negative: SEVERE abdominal pain (e.g., excruciating)   Negative: Pain lasting > 10 minutes and over 50 years old   Negative: Pain lasting > 10 minutes and over 40 years old and associated chest, arm, neck, upper back, or jaw pain   Negative: Pain lasting > 10 minutes and over 35 years old and at least one cardiac risk factor (e.g., diabetes, high cholesterol, hypertension, obesity, smoker or strong family history of heart disease)   Negative: Pain lasting > 10 minutes and history of heart disease (i.e., heart attack, bypass surgery, angina, angioplasty, CHF)   Negative: Pain lasts > 10 minutes and difficulty breathing   Negative: Vomiting red blood or black (coffee ground) material  (Exception: Few streaks and only occurred once.)   Negative: Blood in bowel movements  (Exception: Blood on surface of BM with constipation.)   Negative: Black or tarry bowel movements  (Exception: Chronic-unchanged black-grey BMs AND is taking iron pills or Pepto-Bismol.)   Negative: Pregnant 20 weeks or more and new hand or face swelling   Negative: MILD TO MODERATE constant pain lasting > 2 hours   Negative: MILD TO MODERATE pain and not relieved by antacid medicine    Negative: Vomiting bile (green color)   Negative: Patient sounds very sick or weak to the triager   Negative: Vomiting and abdomen looks much more swollen than usual   Negative: White of the eyes have turned yellow (i.e., jaundice)   Negative: Fever > 103 F (39.4 C)   Negative: Fever > 101 F (38.3 C) and over 60 years of age   Negative: Fever > 100.0 F (37.8 C) and has diabetes mellitus or a weak immune system (e.g., HIV positive, cancer chemotherapy, organ transplant, splenectomy, chronic steroids)   Negative: Fever > 100.0 F (37.8 C) and bedridden (e.g., CVA, chronic illness, recovering from surgery)   Negative: Patient wants to be seen   Negative: MODERATE pain that comes and goes (cramps) lasts > 24 hours   Negative: Shock suspected (e.g., cold/pale/clammy skin, too weak to stand, low BP, rapid pulse)   Negative: Sounds like a life-threatening emergency to the triager   Negative: Nausea or vomiting and pregnancy < 20 weeks   Negative: Menstrual Period - Missed or Late (i.e., pregnancy suspected)   Negative: Heat exhaustion suspected (i.e., dehydration from heat exposure)   Negative: Anxiety or stress suspected (i.e., nausea with anxiety attacks or stressful situations)   Negative: Traumatic Brain Injury (TBI) suspected   Negative: Vomiting occurs   Negative: Other symptom is present, see that guideline.  (e.g., chest pain, headache, dizziness, abdominal pain, colds, sore throat, etc.).   Negative: Unable to walk, or can only walk with assistance (e.g., requires support)   Negative: Difficulty breathing   Negative: Insulin-dependent diabetes (Type I) and glucose > 400 mg/dL (22 mmol/L)   Negative: Drinking very little and dehydration suspected (e.g., no urine > 12 hours, very dry mouth, very lightheaded)   Negative: Patient sounds very sick or weak to the triager   Negative: Fever > 104 F  (40 C)   Negative: Fever > 101 F  (38.3 C) and over 60 years of age   Negative: Fever > 100.0 F  (37.8 C) and bedridden  "(e.g., CVA, chronic illness, recovering from surgery)   Negative: Fever > 100.0 F  (37.8 C) and diabetes mellitus or weak immune system (e.g., HIV positive, cancer chemo, splenectomy, chronic steroids)   Negative: Taking any of the following medications: digoxin (Lanoxin), lithium, theophylline, phenytoin (Dilantin)   Negative: Yellowish color of the skin or white of the eye (i.e., jaundice)   Negative: Fever present > 3 days (72 hours)    Answer Assessment - Initial Assessment Questions  1. LOCATION: \"Where does it hurt?\"       Upper right   2. RADIATION: \"Does the pain shoot anywhere else?\" (e.g., chest, back)      Back   3. ONSET: \"When did the pain begin?\" (e.g., minutes, hours or days ago)       3 months  4. SUDDEN: \"Gradual or sudden onset?\"      gradual  5. PATTERN \"Does the pain come and go, or is it constant?\"     - If it comes and goes: \"How long does it last?\" \"Do you have pain now?\"      (Note: Comes and goes means the pain is intermittent. It goes away completely between bouts.)     - If constant: \"Is it getting better, staying the same, or getting worse?\"       (Note: Constant means the pain never goes away completely; most serious pain is constant and gets worse.)       Comes and goes   6. SEVERITY: \"How bad is the pain?\"  (e.g., Scale 1-10; mild, moderate, or severe)     - MILD (1-3): Doesn't interfere with normal activities, abdomen soft and not tender to touch..      - MODERATE (4-7): Interferes with normal activities or awakens from sleep, abdomen tender to touch.      - SEVERE (8-10): Excruciating pain, doubled over, unable to do any normal activities.        3-4 pain   7. RECURRENT SYMPTOM: \"Have you ever had this type of stomach pain before?\" If Yes, ask: \"When was the last time?\" and \"What happened that time?\"       yes  8. AGGRAVATING FACTORS: \"Does anything seem to cause this pain?\" (e.g., foods, stress, alcohol)      Greasy foods  9. CARDIAC SYMPTOMS: \"Do you have any of the following " "symptoms: chest pain, difficulty breathing, sweating, nausea?\"      Nausea, heartburn, gas  10. OTHER SYMPTOMS: \"Do you have any other symptoms?\" (e.g., back pain, diarrhea, fever, urination pain, vomiting)        No fever    Answer Assessment - Initial Assessment Questions  1. NAUSEA SEVERITY: \"How bad is the nausea?\" (e.g., mild, moderate, severe; dehydration, weight loss)    - MILD: loss of appetite without change in eating habits    - MODERATE: decreased oral intake without significant weight loss, dehydration, or malnutrition    - SEVERE: inadequate caloric or fluid intake, significant weight loss, symptoms of dehydration      Mild   2. ONSET: \"When did the nausea begin?\"      3 months  3. VOMITING: \"Any vomiting?\" If Yes, ask: \"How many times today?\"      3 times   4. RECURRENT SYMPTOM: \"Have you had nausea before?\" If Yes, ask: \"When was the last time?\" \"What happened that time?\"      yes  5. CAUSE: \"What do you think is causing the nausea?\"      Unknown    Protocols used: Abdominal Pain - Upper-A-OH, Nausea-A-OH    "

## 2024-05-28 NOTE — TELEPHONE ENCOUNTER
Spoke with patient, relayed providers message below and patient verbalized understanding. Pt stated no further questions and scheduled in pcp's POA for 6/4/24.    Nasima Solis, RN on 5/28/2024 at 12:58 PM

## 2024-05-28 NOTE — TELEPHONE ENCOUNTER
She needs to make an appt to discuss her symptoms. Haven't seen her in 1 year. Ok to use a same day slot for acute symptoms. Will need a physical or annual med check scheduled as well, in order to continue prescription refills - ok to book as next available regular slot for this

## 2024-05-29 ENCOUNTER — PATIENT OUTREACH (OUTPATIENT)
Dept: FAMILY MEDICINE | Facility: CLINIC | Age: 54
End: 2024-05-29
Payer: COMMERCIAL

## 2024-05-29 DIAGNOSIS — R87.810 CERVICAL HIGH RISK HPV (HUMAN PAPILLOMAVIRUS) TEST POSITIVE: Primary | ICD-10-CM

## 2024-05-29 NOTE — LETTER
May 29, 2024      Amena Rosario  [address]   [place] [address]        Dear MsJose,    This letter is to remind you that you are due for your follow-up Pap smear and Human Papillomavirus (HPV) test.    This can be completed at your upcoming visit.    If you have completed the appointment outside of the [facility] system, please have the records forwarded to our office. We will update your chart for your provider to review before your next annual wellness visit.     Thank you for choosing [facility]!      Sincerely,    Your [facility] Care Team

## 2024-05-31 DIAGNOSIS — F31.9 BIPOLAR DISORDER, UNSPECIFIED (H): Primary | ICD-10-CM

## 2024-05-31 DIAGNOSIS — Z79.899 HIGH RISK MEDICATION USE: ICD-10-CM

## 2024-06-04 ENCOUNTER — OFFICE VISIT (OUTPATIENT)
Dept: FAMILY MEDICINE | Facility: CLINIC | Age: 54
End: 2024-06-04
Payer: COMMERCIAL

## 2024-06-04 VITALS
DIASTOLIC BLOOD PRESSURE: 76 MMHG | TEMPERATURE: 98.2 F | WEIGHT: 141 LBS | BODY MASS INDEX: 26.62 KG/M2 | OXYGEN SATURATION: 99 % | HEART RATE: 82 BPM | SYSTOLIC BLOOD PRESSURE: 122 MMHG | RESPIRATION RATE: 16 BRPM | HEIGHT: 61 IN

## 2024-06-04 DIAGNOSIS — D32.9 MENINGIOMA (H): ICD-10-CM

## 2024-06-04 DIAGNOSIS — Z79.899 HIGH RISK MEDICATION USE: ICD-10-CM

## 2024-06-04 DIAGNOSIS — F31.9 BIPOLAR AFFECTIVE DISORDER, REMISSION STATUS UNSPECIFIED (H): ICD-10-CM

## 2024-06-04 DIAGNOSIS — E22.2 SIADH (SYNDROME OF INAPPROPRIATE ADH PRODUCTION) (H): ICD-10-CM

## 2024-06-04 DIAGNOSIS — Z12.11 SCREEN FOR COLON CANCER: ICD-10-CM

## 2024-06-04 DIAGNOSIS — R10.11 RUQ ABDOMINAL PAIN: Primary | ICD-10-CM

## 2024-06-04 LAB — HBA1C MFR BLD: 5.6 % (ref 0–5.6)

## 2024-06-04 PROCEDURE — 82248 BILIRUBIN DIRECT: CPT | Performed by: PHYSICIAN ASSISTANT

## 2024-06-04 PROCEDURE — 83036 HEMOGLOBIN GLYCOSYLATED A1C: CPT | Performed by: PHYSICIAN ASSISTANT

## 2024-06-04 PROCEDURE — 99214 OFFICE O/P EST MOD 30 MIN: CPT | Performed by: PHYSICIAN ASSISTANT

## 2024-06-04 PROCEDURE — 80061 LIPID PANEL: CPT | Performed by: PHYSICIAN ASSISTANT

## 2024-06-04 PROCEDURE — 36415 COLL VENOUS BLD VENIPUNCTURE: CPT | Performed by: PHYSICIAN ASSISTANT

## 2024-06-04 PROCEDURE — 80053 COMPREHEN METABOLIC PANEL: CPT | Performed by: PHYSICIAN ASSISTANT

## 2024-06-04 NOTE — PROGRESS NOTES
"  Assessment & Plan       ICD-10-CM    1. RUQ abdominal pain  R10.11 US Abdomen Limited     NM Hepatobiliary Scan with GB EF and/or Pharm     Hepatic panel (Albumin, ALT, AST, Bili, Alk Phos, TP)     Hepatic panel (Albumin, ALT, AST, Bili, Alk Phos, TP)      2. Screen for colon cancer  Z12.11 COLOGUARD(EXACT SCIENCES)     COLOGUARD(EXACT SCIENCES)      3. Bipolar disorder, unspecified (H)  F31.9 Basic metabolic panel     Hemoglobin A1c     Lipid Profile      4. High risk medication use  Z79.899 Basic metabolic panel     Hemoglobin A1c     Lipid Profile      5. SIADH (syndrome of inappropriate ADH production) (H24)  E22.2       6. Meningioma (H)  D32.9           1) Will check an abdominal US and HIDA scan. If negative, we discussed doing an EGD for further evaluation. We also discussed GERD triggers and what to try to avoid. LFTs in process.     3,4) Labs released for another provider    5) Electrolytes in process    6) She will be calling neurosurgery for follow up       Nicotine/Tobacco Cessation  She reports that she has been smoking cigarettes. She has been exposed to tobacco smoke. She has never used smokeless tobacco.    BMI  Estimated body mass index is 27 kg/m  as calculated from the following:    Height as of this encounter: 1.539 m (5' 0.59\").    Weight as of this encounter: 64 kg (141 lb).     Return in about 4 weeks (around 7/2/2024) for your annual physical, with April, in person.       Dontrell Beckwith is a 53 year old, presenting for the following health issues:  GI Problem        6/4/2024    11:51 AM   Additional Questions   Roomed by Sharonda   Accompanied by Self         6/4/2024    11:51 AM   Patient Reported Additional Medications   Patient reports taking the following new medications na     HPI     Patient arrived to follow-up with worsening GERD.    Patient is also requesting Thyroid, Potassium and Sodium levels to be checked.     GERD/Heartburm Follow-up   Description: Patient arrived to " "follow-up with worsening symptoms of Heartburn and Nausea, symptoms occurring daily.   Intensity: moderate to severe  Progression of Symptoms: worsening  Accompanying Signs & Symptoms:  Does it feel like food gets stuck or trouble swallowing: YES  Nausea: YES  Vomiting (bloody?): YES - Pt   Abdominal Pain: YES - Upper Abdominal Pains   Black-Tarry stools: No  Bloody stools: No  History:  Previous similar episodes: No  Previous ulcers: YES  Precipitating factors:   Caffeine use: YES - 1-2 cups of coffee per day   Alcohol use: No  NSAID/Aspirin use: No  Tobacco use: YES - Pt has been trying to cut back, smoking around 3 cigarettes per day   Worse with fatty foods, spicy foods, onion and sugar.  Alleviating factors: None  Therapies tried and outcome:             Lifestyle changes: Cutting             Medications: Omeprazole (Prilosec) and Zofran; medications are helpful            Objective    /76 (BP Location: Right arm, Patient Position: Chair, Cuff Size: Adult Regular)   Pulse 82   Temp 98.2  F (36.8  C) (Tympanic)   Resp 16   Ht 1.539 m (5' 0.59\")   Wt 64 kg (141 lb)   SpO2 99%   BMI 27.00 kg/m    Body mass index is 27 kg/m .  Physical Exam               Signed Electronically by: April Bergeron PA-C    "

## 2024-06-04 NOTE — LETTER
June 6, 2024      Amena Rosario  [address]   [place] [address]        Dear ,    We are writing to inform you of your test results.    One of your liver enzymes is slightly elevated. This is likely related to alcohol intake or diet. Try to either cut back on alcohol or fat in your diet.     Resulted Orders   Hepatic panel (Albumin, ALT, AST, Bili, Alk Phos, TP)   Result Value Ref Range    Protein Total 7.4 6.4 - 8.3 g/dL    Albumin 4.6 3.5 - 5.2 g/dL    Bilirubin Total 0.3 <=1.2 mg/dL    Alkaline Phosphatase 81 40 - 150 U/L    AST 46 (H) 0 - 45 U/L      Comment:      Reference intervals for this test were updated on 6/12/2023 to more accurately reflect our healthy population. There may be differences in the flagging of prior results with similar values performed with this method. Interpretation of those prior results can be made in the context of the updated reference intervals.    ALT 30 0 - 50 U/L      Comment:      Reference intervals for this test were updated on 6/12/2023 to more accurately reflect our healthy population. There may be differences in the flagging of prior results with similar values performed with this method. Interpretation of those prior results can be made in the context of the updated reference intervals.      Bilirubin Direct <0.20 0.00 - 0.30 mg/dL       If you have any questions or concerns, please call the clinic at the number listed above.       Sincerely,      April Bergeron PA-C

## 2024-06-05 LAB
ALBUMIN SERPL BCG-MCNC: 4.6 G/DL (ref 3.5–5.2)
ALP SERPL-CCNC: 81 U/L (ref 40–150)
ALT SERPL W P-5'-P-CCNC: 30 U/L (ref 0–50)
ANION GAP SERPL CALCULATED.3IONS-SCNC: 11 MMOL/L (ref 7–15)
AST SERPL W P-5'-P-CCNC: 46 U/L (ref 0–45)
BILIRUB DIRECT SERPL-MCNC: <0.2 MG/DL (ref 0–0.3)
BILIRUB SERPL-MCNC: 0.3 MG/DL
BUN SERPL-MCNC: 18.8 MG/DL (ref 6–20)
CALCIUM SERPL-MCNC: 9.6 MG/DL (ref 8.6–10)
CHLORIDE SERPL-SCNC: 103 MMOL/L (ref 98–107)
CHOLEST SERPL-MCNC: 188 MG/DL
CREAT SERPL-MCNC: 1.06 MG/DL (ref 0.51–0.95)
DEPRECATED HCO3 PLAS-SCNC: 27 MMOL/L (ref 22–29)
EGFRCR SERPLBLD CKD-EPI 2021: 62 ML/MIN/1.73M2
FASTING STATUS PATIENT QL REPORTED: NO
FASTING STATUS PATIENT QL REPORTED: NO
GLUCOSE SERPL-MCNC: 112 MG/DL (ref 70–99)
HDLC SERPL-MCNC: 70 MG/DL
LDLC SERPL CALC-MCNC: 95 MG/DL
NONHDLC SERPL-MCNC: 118 MG/DL
POTASSIUM SERPL-SCNC: 4.3 MMOL/L (ref 3.4–5.3)
PROT SERPL-MCNC: 7.4 G/DL (ref 6.4–8.3)
SODIUM SERPL-SCNC: 141 MMOL/L (ref 135–145)
TRIGL SERPL-MCNC: 115 MG/DL

## 2024-06-11 DIAGNOSIS — J45.40 MODERATE PERSISTENT ASTHMA WITHOUT COMPLICATION: ICD-10-CM

## 2024-06-12 RX ORDER — FLUTICASONE PROPIONATE AND SALMETEROL 250; 50 UG/1; UG/1
1 POWDER RESPIRATORY (INHALATION) EVERY 12 HOURS
Qty: 60 EACH | Refills: 2 | Status: SHIPPED | OUTPATIENT
Start: 2024-06-12

## 2024-06-13 ENCOUNTER — TELEPHONE (OUTPATIENT)
Dept: FAMILY MEDICINE | Facility: CLINIC | Age: 54
End: 2024-06-13
Payer: COMMERCIAL

## 2024-06-13 NOTE — TELEPHONE ENCOUNTER
Patient called in to discuss labs that were drawn on 6/4/24.     RN stated many were drawn that were ordered by Susan Conrad. RN instructed patient reach out to her clinic to request she interpret those results. Patient agreeable to this.       RN relayed April's lab result note      April Bergeron PA-C  6/6/2024  7:52 AM CDT       Please send the following letter to the patient:     Amena,     One of your liver enzymes is slightly elevated. This is likely related to alcohol intake or diet. Try to either cut back on alcohol or fat in your diet.     Please call me with any questions or concerns.              April Bergeron PA-C       Patient verbalized understanding.       Rossy Conrad RN on 6/13/2024 at 2:08 PM

## 2024-06-24 DIAGNOSIS — R11.0 NAUSEA: ICD-10-CM

## 2024-06-25 RX ORDER — ONDANSETRON 4 MG/1
TABLET, ORALLY DISINTEGRATING ORAL
Qty: 30 TABLET | Refills: 0 | Status: SHIPPED | OUTPATIENT
Start: 2024-06-25 | End: 2024-07-24

## 2024-07-08 ENCOUNTER — ANCILLARY PROCEDURE (OUTPATIENT)
Dept: NUCLEAR MEDICINE | Facility: CLINIC | Age: 54
End: 2024-07-08
Attending: PHYSICIAN ASSISTANT
Payer: COMMERCIAL

## 2024-07-08 DIAGNOSIS — R10.11 RUQ ABDOMINAL PAIN: ICD-10-CM

## 2024-07-08 PROCEDURE — A9537 TC99M MEBROFENIN: HCPCS | Performed by: RADIOLOGY

## 2024-07-08 PROCEDURE — 78227 HEPATOBIL SYST IMAGE W/DRUG: CPT | Performed by: RADIOLOGY

## 2024-07-08 RX ORDER — KIT FOR THE PREPARATION OF TECHNETIUM TC 99M MEBROFENIN 45 MG/10ML
6.2 INJECTION, POWDER, LYOPHILIZED, FOR SOLUTION INTRAVENOUS ONCE
Status: COMPLETED | OUTPATIENT
Start: 2024-07-08 | End: 2024-07-08

## 2024-07-08 RX ADMIN — KIT FOR THE PREPARATION OF TECHNETIUM TC 99M MEBROFENIN 6.2 MILLICURIE: 45 INJECTION, POWDER, LYOPHILIZED, FOR SOLUTION INTRAVENOUS at 08:48

## 2024-07-09 ENCOUNTER — TELEPHONE (OUTPATIENT)
Dept: FAMILY MEDICINE | Facility: CLINIC | Age: 54
End: 2024-07-09
Payer: COMMERCIAL

## 2024-07-09 DIAGNOSIS — R10.11 RUQ ABDOMINAL PAIN: Primary | ICD-10-CM

## 2024-07-09 DIAGNOSIS — R11.0 NAUSEA: ICD-10-CM

## 2024-07-09 NOTE — TELEPHONE ENCOUNTER
Patient demanded that she takes the gallbladder out. RN stated that this can be discussed at her general surgery consult.     RN gave phone number for general surgery and [facility] sound.     Niharika Killian RN on 7/9/2024 at 11:06 AM

## 2024-07-09 NOTE — TELEPHONE ENCOUNTER
Please call patient with the following info:    Her HIDA scan results show hyperactive gallbladder function. This could be the cause of her pain, there are some studies on hyperactivity and pain. She hasn't done her abdominal US though - she should get this done as well  I've placed a referral for her to see general surgery to discuss further

## 2024-07-10 DIAGNOSIS — J30.2 SEASONAL ALLERGIES: ICD-10-CM

## 2024-07-10 RX ORDER — OLOPATADINE HYDROCHLORIDE 1 MG/ML
1 SOLUTION/ DROPS OPHTHALMIC 2 TIMES DAILY
Qty: 5 ML | Refills: 0 | Status: SHIPPED | OUTPATIENT
Start: 2024-07-10

## 2024-07-12 DIAGNOSIS — J30.2 SEASONAL ALLERGIES: ICD-10-CM

## 2024-07-12 RX ORDER — LORATADINE 10 MG/1
1 TABLET ORAL DAILY
Qty: 90 TABLET | Refills: 0 | Status: SHIPPED | OUTPATIENT
Start: 2024-07-12

## 2024-07-15 ENCOUNTER — PATIENT OUTREACH (OUTPATIENT)
Dept: CARE COORDINATION | Facility: CLINIC | Age: 54
End: 2024-07-15

## 2024-07-17 ENCOUNTER — ANCILLARY PROCEDURE (OUTPATIENT)
Dept: ULTRASOUND IMAGING | Facility: CLINIC | Age: 54
End: 2024-07-17
Attending: PHYSICIAN ASSISTANT
Payer: COMMERCIAL

## 2024-07-17 DIAGNOSIS — R10.11 RUQ ABDOMINAL PAIN: ICD-10-CM

## 2024-07-17 PROCEDURE — 76705 ECHO EXAM OF ABDOMEN: CPT | Mod: TC | Performed by: RADIOLOGY

## 2024-07-17 NOTE — LETTER
July 18, 2024      Amena Rosario  [address]   [place] [address]        Dear ,    We are writing to inform you of your test results.    Your abdominal ultrasound is normal.   The next step would be an abdominal CT scan OR follow up with a GI specialist.     Please call me with any questions or concerns.     Resulted Orders   US Abdomen Limited    Narrative    US ABDOMEN LIMITED 7/17/2024 2:59 PM    CLINICAL HISTORY: RUQ abdominal pain  TECHNIQUE: Limited abdominal ultrasound.    COMPARISON: Nuclear medicine hepatobiliary scan 7/8/2024    FINDINGS:    GALLBLADDER: The gallbladder is normal. No gallstones, wall  thickening, or pericholecystic fluid. Negative sonographic Bustillos's  sign.    BILE DUCTS: There is no intrahepatic biliary dilatation. The common  duct measures 5 mm.    LIVER: Normal where seen.    RIGHT KIDNEY: Possible small extrarenal pelvis without juhi  hydronephrosis.    PANCREAS: The visualized portions of the pancreas are normal.    No ascites.      Impression    IMPRESSION:  1.  Unremarkable right upper quadrant abdominal ultrasound.    SHANON HERNANDEZ MD         SYSTEM ID:  [id number]       Sincerely,      April Bergeron PA-C

## 2024-07-18 ENCOUNTER — TELEPHONE (OUTPATIENT)
Dept: FAMILY MEDICINE | Facility: CLINIC | Age: 54
End: 2024-07-18
Payer: COMMERCIAL

## 2024-07-18 NOTE — TELEPHONE ENCOUNTER
Test Results        Who ordered the test: April Bergeron    Type of test: Lab and Ultrasound    Date of test:  7/17/24    Where was the test performed:  [facility]    What are your questions/concerns?:  Wanting to know the results    Okay to leave a detailed message?: Yes at Work number on file: [phone]

## 2024-07-18 NOTE — RESULT ENCOUNTER NOTE
Please send the following letter to the patient:    Amena,    Your abdominal ultrasound is normal.  The next step would be an abdominal CT scan OR follow up with a GI specialist.     Please call me with any questions or concerns.          April Bergeron PA-C

## 2024-07-19 DIAGNOSIS — I10 BENIGN ESSENTIAL HYPERTENSION: ICD-10-CM

## 2024-07-19 RX ORDER — LISINOPRIL 10 MG/1
10 TABLET ORAL DAILY
Qty: 100 TABLET | Refills: 2 | Status: SHIPPED | OUTPATIENT
Start: 2024-07-19 | End: 2024-08-16

## 2024-07-19 NOTE — TELEPHONE ENCOUNTER
Patient has [facility] coverage and is part of Medicare Part-D Low Income Subsidy program. With this program, the patient is eligible to get certain prescriptions as a 100-day supply at the 30-day prescription supply cost.      Prescriptions to be updated to 100-day supply: lisinopril    New prescription needed given insufficient refills so pended for PCP review and signature.       Thank you!    Georgina Ricci, PharmD, [facility]  Population Health Pharmacist  [phone]

## 2024-07-19 NOTE — TELEPHONE ENCOUNTER
Called patient and left a voicemail to return our call to the clinic.       Rossy Conrad RN on 7/19/2024 at 1:27 PM

## 2024-07-19 NOTE — TELEPHONE ENCOUNTER
Patient returned call. RN updated. She has no further questions.     Niharika Killian RN on 7/19/2024 at 2:13 PM

## 2024-07-19 NOTE — TELEPHONE ENCOUNTER
"Patient called back, and advised patient on her results below,    She stated that she is trying to diet.  She stated that she drinks ETOH, only one here and there.    She wants her gallbladder taken out.    She is still having pain,  in the upper right stomach, and goes down into her right side, and then sometimes in her lower left quad.  The pain is the same has it has been for 3 months and she feels that it is getting worse.  She stated that nothing help, not a change in diet, or anything else  She stated that she has nausea every day    Patient stated that she is moving to [place] 8/9/24.      Advised patient on her results below, and advise.    Patient stated that she had a HIDA scan, before her US, and she is wondering if she still needs a CT.    Patient has not seen a gastro provider in a \"long time\".  Kathya THORPE RN  Triage Nurse  [facility]      "

## 2024-07-19 NOTE — TELEPHONE ENCOUNTER
Is she still having pain? If so, we could do a CT or I could refer her to a GI specialist    Ultrasound:      Labs:

## 2024-07-19 NOTE — TELEPHONE ENCOUNTER
RN left message to return call to clinic [phone].  (RN did not leave specific details on voicemail for confidential reasons)    Niharika Killian RN on 7/19/2024 at 9:39 AM

## 2024-07-19 NOTE — TELEPHONE ENCOUNTER
I placed a referral to general surgery on 7/9/24 so she could discuss possible gallbladder removal    If you don't hear from a representative within 2 business days, please call [phone].

## 2024-07-20 DIAGNOSIS — J45.40 MODERATE PERSISTENT ASTHMA WITHOUT COMPLICATION: ICD-10-CM

## 2024-07-22 RX ORDER — ALBUTEROL SULFATE 90 UG/1
1-2 AEROSOL, METERED RESPIRATORY (INHALATION) EVERY 4 HOURS PRN
Qty: 17 G | Refills: 1 | Status: SHIPPED | OUTPATIENT
Start: 2024-07-22

## 2024-08-12 ENCOUNTER — PATIENT OUTREACH (OUTPATIENT)
Dept: CARE COORDINATION | Facility: CLINIC | Age: 54
End: 2024-08-12
Payer: COMMERCIAL

## 2024-08-16 ENCOUNTER — TELEPHONE (OUTPATIENT)
Dept: FAMILY MEDICINE | Facility: CLINIC | Age: 54
End: 2024-08-16
Payer: COMMERCIAL

## 2024-08-16 DIAGNOSIS — I10 BENIGN ESSENTIAL HYPERTENSION: ICD-10-CM

## 2024-08-16 RX ORDER — LISINOPRIL 10 MG/1
10 TABLET ORAL DAILY
Qty: 100 TABLET | Refills: 2 | Status: SHIPPED | OUTPATIENT
Start: 2024-08-16

## 2024-08-16 NOTE — TELEPHONE ENCOUNTER
Attempted to reach Amena JUAN Carmenyasmin in regards to their lisinopril being overdue for refill. Unable to leave voicemail. Per our records last filled 5/28/24 for 60 day supply and is 20 days late to refill.     Patient has [facility] coverage and is part of Medicare Part-D Low Income Subsidy program. With this program, the patient is eligible to get certain prescriptions as a 100-day supply at the 30-day prescription supply cost.      Prescriptions updated to 100-day supply: lisinopril        Georgina Ricci, PharmD, BCACP  Population Health Pharmacist  [phone]

## 2024-09-04 NOTE — TELEPHONE ENCOUNTER
FYI to provider - Patient is lost to pap tracking follow-up. Attempts to contact pt have been made per reminder process and there has been no reply and/or no appt scheduled. Contact hx listed below.     7/27/20 abnormal pap 10+ years ago per patient  2012 NIL Pap, Neg HPV (Care Everywhere)  7/27/20 NIL Pap, + HR HPV (neg 16/18). Plan cotest in 1 year  9/8/21 Lost to follow-up for pap tracking    11/17/21 ASCUS pap, neg HR HPV. Plan: Cotest in 1 yr.   12/30/22 Lost to follow-up for pap tracking  6/12/23 NIL pap, neg HPV. Plan: cotest in 1 year  5/29/24 Reminder letter  7/2/24 Appt -- cancelled  7/29/24 Reminder call -- left message  9/4/24 Lost to follow-up for pap tracking     Hemalatha Bowers RN BSN, Pap Tracking

## 2024-12-09 ENCOUNTER — PATIENT OUTREACH (OUTPATIENT)
Dept: CARE COORDINATION | Facility: CLINIC | Age: 54
End: 2024-12-09
Payer: COMMERCIAL